# Patient Record
Sex: FEMALE | Race: BLACK OR AFRICAN AMERICAN | NOT HISPANIC OR LATINO | Employment: OTHER | URBAN - METROPOLITAN AREA
[De-identification: names, ages, dates, MRNs, and addresses within clinical notes are randomized per-mention and may not be internally consistent; named-entity substitution may affect disease eponyms.]

---

## 2017-03-01 DIAGNOSIS — H40.1133 PRIMARY OPEN ANGLE GLAUCOMA OF BOTH EYES, SEVERE STAGE: ICD-10-CM

## 2017-03-02 RX ORDER — PREDNISOLONE ACETATE 10 MG/ML
1 SUSPENSION/ DROPS OPHTHALMIC 2 TIMES DAILY
Qty: 5 ML | Refills: 0 | Status: SHIPPED | OUTPATIENT
Start: 2017-03-02 | End: 2017-04-10

## 2017-03-02 NOTE — TELEPHONE ENCOUNTER
prednisoLONE acetate (PRED FORTE) 1 % ophthalmic susp      Last Written Prescription Date:  1-17-16  Last Fill Quantity: 5 ml,   # refills: 0  Last Office Visit: 10-5-16  Future Office visit:   3-10-17  Last Note:Progress Notes      1)Endstage NVG OS -- s/p TSCPC OS (4/25/16), s/p Tube OS x2 (8/15 and last in 11/15 by Dr. Harris), H/O tube revision x2 (last on 2/23/16), H/O tube retraction and conj retraction per old notes, etiology of vision loss -- K pachy: 618/-- Tmax: OS:64 per old notes HVF:   CDR: OD:0.4 and OS:No view HRT/OCT: FHX of Glc: No  Gonio: Intolerant to: Asthma/COPD: No, on po BB but smokes Steroid Use: Yes, po pred for RA Kidney Stones: Mild CRI per patient (PCP is Dr. Carbajal at [facility] in [place]) Sulfa Allergy: No IOP targets: OS:Comfort -- IOP good  -- pt adamantly does not want to remove the left eye if possible -- eye now non-painful  2)DM c PDR -- was receiving injections with Dr. Burk  3)RA on Plaquenil and Remicade -- following with [facility]umat[facility]  4)PCIOL  5)H/O Multiple Hyphemas OS s/p washout in OR (last 2/24/16) -- [facility] without evidence of choroidal hge -- 100% Hyphema with H/O complete blood staining of K OS -- now clearing peripherally  6)H/O CAD s/p CABG per old notes  7)H/O Left sided HA -- Much improved -- likely 2/2 IOP -- will plan for HA eval with PCP prior to consideration of enucleation  8)Ptosis OS -- pt may be interested in ptosis repair OS     Patient will discontinue and hold onto Combigan (Timolol/brimonidine) which is a blue top drop. Patient will continue on Travatan which is a teal top drop at bedtime in the left eye, and Prednisolone acetate 2x/day until seen in the left eye. Patient will also continue Genteal gel 4-6x/day in the left eye. Patient will return to clinic in 3-4 months with repeat IOP check. Patient will also follow up with D.r Johnston Kathleen M Doege RN

## 2017-03-15 ENCOUNTER — OFFICE VISIT (OUTPATIENT)
Dept: OPHTHALMOLOGY | Facility: CLINIC | Age: 57
End: 2017-03-15

## 2017-03-15 ENCOUNTER — TRANSFERRED RECORDS (OUTPATIENT)
Dept: HEALTH INFORMATION MANAGEMENT | Facility: CLINIC | Age: 57
End: 2017-03-15

## 2017-03-15 DIAGNOSIS — H40.52X3 NVG (NEOVASCULAR GLAUCOMA), LEFT, SEVERE STAGE: Primary | ICD-10-CM

## 2017-03-15 DIAGNOSIS — Z96.1 PSEUDOPHAKIA OF BOTH EYES: ICD-10-CM

## 2017-03-15 PROBLEM — H40.1111 PRIMARY OPEN ANGLE GLAUCOMA OF RIGHT EYE, MILD STAGE: Status: ACTIVE | Noted: 2017-03-15

## 2017-03-15 PROBLEM — H40.1111 PRIMARY OPEN ANGLE GLAUCOMA OF RIGHT EYE, MILD STAGE: Status: RESOLVED | Noted: 2017-03-15 | Resolved: 2017-03-15

## 2017-03-15 ASSESSMENT — CONF VISUAL FIELD
OS_INFERIOR_TEMPORAL_RESTRICTION: 1
OS_INFERIOR_NASAL_RESTRICTION: 1
OS_SUPERIOR_TEMPORAL_RESTRICTION: 1
OS_SUPERIOR_NASAL_RESTRICTION: 1

## 2017-03-15 ASSESSMENT — VISUAL ACUITY
METHOD: SNELLEN - LINEAR
OS_SC: NLP
OD_SC: 20/25-2

## 2017-03-15 ASSESSMENT — SLIT LAMP EXAM - LIDS
COMMENTS: NORMAL, PTOSIS
COMMENTS: NORMAL

## 2017-03-15 ASSESSMENT — TONOMETRY
OD_IOP_MMHG: 28
OS_IOP_MMHG: 12
IOP_METHOD: TONOPEN
OD_IOP_MMHG: 28
IOP_METHOD: APPLANATION

## 2017-03-15 ASSESSMENT — CUP TO DISC RATIO: OD_RATIO: 0.3

## 2017-03-15 ASSESSMENT — EXTERNAL EXAM - LEFT EYE: OS_EXAM: NORMAL

## 2017-03-15 ASSESSMENT — EXTERNAL EXAM - RIGHT EYE: OD_EXAM: NORMAL

## 2017-03-15 NOTE — MR AVS SNAPSHOT
After Visit Summary   3/15/2017    Connie Longoria    MRN: [id number]           Patient Information     Date Of Birth          1960        Visit Information        Provider Department      3/15/2017 8:15 AM Bethany William MD [facility]        Today's Diagnoses     NVG (neovascular glaucoma), left, severe stage    -  1    Pseudophakia of both eyes          Care Instructions    Patient will discontinue and hold onto Combigan (Timolol/brimonidine) which is a blue top drop.  Patient will continue on Travatan which is a teal top drop at bedtime in the RIGHT EYE ONLY, and Prednisolone acetate 1x/day until seen in the left eye.  Patient will also continue Genteal gel 4-6x/day in the left eye. Patient will return to clinic in 1-2 months with repeat IOP check and OCT with RNFL analysis.  Patient will also follow up with the retina center for evaluation.          Follow-ups after your visit        Follow-up notes from your care team     Return 1-2 months with repeat IOP check and OCT with RNFL analysis..      Who to contact     Please call your clinic at [phone] to:    Ask questions about your health    Make or cancel appointments    Discuss your medicines    Learn about your test results    Speak to your doctor   If you have compliments or concerns about an experience at your clinic, or if you wish to file a complaint, please contact [facility] Patient Relations at [phone] or email us at [email or web address]         Additional Information About Your Visit        MyChart Information     [facility] ([facility]) is an electronic gateway that provides easy, online access to your medical records. With [facility] ([facility]), you can request a clinic appointment, read your test results, renew a prescription or communicate with your care team.     To sign up for [facility] ([facility]) visit the website at [email or web address]   You will be asked to enter the access code  listed below, as well as some personal information. Please follow the directions to create your username and password.     Your access code is: [email or web address]  Expires: 2017  8:48 AM     Your access code will  in 90 days. If you need help or a new code, please contact your [facility] or call [phone] for assistance.        Care EveryWhere ID     This is your Care EveryWhere ID. This could be used by other organizations to access your [place] medical records  [id number]         Blood Pressure from Last 3 Encounters:   16 128/62   11/03/15 135/78   10/09/15 95/59    Weight from Last 3 Encounters:   16 67.1 kg (148 lb)   11/03/15 68.5 kg (151 lb)   10/09/15 65.8 kg (145 lb)              Today, you had the following     No orders found for display         Today's Medication Changes          These changes are accurate as of: 3/15/17  8:48 AM.  If you have any questions, ask your nurse or doctor.               These medicines have changed or have updated prescriptions.        Dose/Directions    TRAVATAN Z 0.004 % ophthalmic solution   This may have changed:  Another medication with the same name was removed. Continue taking this medication, and follow the directions you see here.   Used for:  NVG (neovascular glaucoma), left, severe stage   Generic drug:  travoprost (AKILAH Free)   Changed by:  Bethany William MD        Dose:  1 drop   Place 1 drop Into the left eye At Bedtime   Quantity:  3 Bottle   Refills:  3         Stop taking these medicines if you haven't already. Please contact your care team if you have questions.     METFORMIN HCL PO   Stopped by:  Bethany William MD                    Primary Care Provider Office Phone # Fax #    Robinson Carbajal [phone]       [facility] [address]  [facility] [address]        Thank you!     Thank you for choosing [facility]  for your  care. Our goal is always to provide you with excellent care. Hearing back from our patients is one way we can continue to improve our services. Please take a few minutes to complete the written survey that you may receive in the mail after your visit with us. Thank you!             Your Updated Medication List - Protect others around you: Learn how to safely use, store and throw away your medicines at www.disposemymeds.org.          This list is accurate as of: 3/15/17  8:48 AM.  Always use your most recent med list.                   Brand Name Dispense Instructions for use    aspirin 81 MG tablet      Take 81 mg by mouth daily       atropine 1 % ophthalmic solution     5 mL    Place 1 drop Into the left eye daily       brimonidine-timolol 0.2-0.5 % ophthalmic solution    COMBIGAN    10 mL    Place 1 drop Into the left eye 2 times daily       Carboxymethylcell-Hypromellose 0.25-0.3 % Gel     30 mL    Place 1 Application Into the left eye 6 times daily       FOSAMAX PO      Take 70 mg by mouth every 30 days       gabapentin 400 MG capsule    NEURONTIN     Take 400 mg by mouth 3 times daily       hydroxychloroquine 200 MG tablet    PLAQUENIL     Take 200 mg by mouth daily       inFLIXimab 100 MG injection    REMICADE     once       insulin detemir 100 UNIT/ML injection    LEVEMIR     Inject Subcutaneous At Bedtime       LISINOPRIL PO      Take 10 mg by mouth daily       METOPROLOL TARTRATE PO      Take 25 mg by mouth daily       ofloxacin 0.3 % ophthalmic solution    OCUFLOX     Place 1 drop Into the left eye 2 times daily       polyethylene glycol powder    MIRALAX/GLYCOLAX     Take 1 capful by mouth daily       prednisoLONE acetate 1 % ophthalmic susp    PRED FORTE    5 mL    Place 1 drop Into the left eye 2 times daily       predniSONE 5 MG tablet    DELTASONE     Take 5 mg by mouth daily       PRILOSEC PO      Take 10 mg by mouth 2 times daily (before meals)       REGLAN PO      Take 5 mg by mouth        SIMVASTATIN PO      Take 10 mg by mouth At Bedtime       sucralfate 1 GM/10ML suspension    CARAFATE     Take 1 g by mouth 4 times daily       TRAVATAN Z 0.004 % ophthalmic solution   Generic drug:  travoprost (AKILAH Free)     3 Bottle    Place 1 drop Into the left eye At Bedtime       ZOFRAN PO      Take 4 mg by mouth

## 2017-03-15 NOTE — PROGRESS NOTES
1)POAG OD/Endstage NVG OS -- s/p TSCPC OS (4/25/16), s/p Tube OS x2 (8/15 and last in 11/15 by Dr. Harris), H/O tube revision x2 (last on 2/23/16), H/O tube retraction and conj retraction per old notes, etiology of vision loss -- K pachy: 618/--  Tmax: OS:64 per old notes HVF:   CDR: OD:0.4 and OS:No view HRT/OCT: FHX of Glc: No  Gonio: Intolerant to: Asthma/COPD: No, on po BB but smokes Steroid Use: Yes, po pred for RA Kidney Stones: Mild CRI per patient (PCP is Dr. Carbajal at [facility] in [place]) Sulfa Allergy: No IOP targets: OD:L-M20s and OS:Comfort -- IOP good  -- pt adamantly does not want to remove the left eye if possible -- eye now non-painful  2)DM c PDR -- was receiving injections with Dr. Burk  3)RA on Plaquenil and Remicade -- following with Rheumat[facility]  4)PCIOL  5)H/O Multiple Hyphemas OS s/p washout in OR (last 2/24/16) -- Bscan done 2/16 at  eye without evidence of choroidal hge -- 100% Hyphema with H/O complete blood staining of K OS -- now clearing peripherally  6)H/O CAD s/p CABG per old notes  7)H/O Left sided HA --  Much improved -- was likely 2/2 IOP -- will plan for HA eval with PCP prior to consideration of enucleation  8)Ptosis OS -- pt may be interested in ptosis repair OS    Patient will discontinue and hold onto Combigan (Timolol/brimonidine) which is a blue top drop.  Patient will continue on Travatan which is a teal top drop at bedtime in the RIGHT EYE ONLY, and Prednisolone acetate 1x/day until seen in the left eye.  Patient will also continue Genteal gel 4-6x/day in the left eye. Patient will return to clinic in 1-2 months with repeat IOP check, gonioscopy and OCT with RNFL analysis.  Patient will also follow up with the retina center for evaluation.        Attending Physician Attestation:  Complete documentation of historical and exam elements from today's encounter can be found in the full encounter summary report (not reduplicated in this  progress note). I personally obtained the chief complaint(s) and history of present illness.  I confirmed and edited as necessary the review of systems, past medical/surgical history, family history, social history, and examination findings as documented by others; and I examined the patient myself. I personally reviewed the relevant tests, images, and reports as documented above. I formulated and edited as necessary the assessment and plan and discussed the findings and management plan with the patient and family.  - Bethany William MD 8:48 AM 3/15/2017

## 2017-03-15 NOTE — NURSING NOTE
Chief Complaints and History of Present Illnesses   Patient presents with     Glaucoma Follow Up     HPI    Affected eye(s):  Both   Symptoms:     No decreased vision      Duration:  5 months   Frequency:  Constant       Do you have eye pain now?:  No      Comments:  Feeling very good, no eye pain    Travatan which is a teal top drop at bedtime in the left eye (9:30am)  Prednisolone acetate 2x/day until seen in the left eye  Genteal gel 4-6x/day in the left eye (uses as needed - not using very often)    MARIA ESTHER Figueroa 8:01 AM 03/15/2017

## 2017-03-15 NOTE — PATIENT INSTRUCTIONS
Patient will discontinue and hold onto Combigan (Timolol/brimonidine) which is a blue top drop.  Patient will continue on Travatan which is a teal top drop at bedtime in the RIGHT EYE ONLY, and Prednisolone acetate 1x/day until seen in the left eye.  Patient will also continue Genteal gel 4-6x/day in the left eye. Patient will return to clinic in 1-2 months with repeat IOP check and OCT with RNFL analysis.  Patient will also follow up with the retina center for evaluation.

## 2017-03-18 DIAGNOSIS — H40.52X3 NVG (NEOVASCULAR GLAUCOMA), LEFT, SEVERE STAGE: ICD-10-CM

## 2017-03-20 RX ORDER — BRIMONIDINE TARTRATE AND TIMOLOL MALEATE 2; 5 MG/ML; MG/ML
1 SOLUTION OPHTHALMIC 2 TIMES DAILY
Qty: 5 ML | Refills: 2
Start: 2017-03-20

## 2017-03-20 NOTE — TELEPHONE ENCOUNTER
Per 3-15-17 note:  Patient will discontinue and hold onto Combigan (Timolol/brimonidine) which is a blue top drop.    Pharmacy will be notified and instructed to have the patient call the clinic with any questions.      Kathleen M Doege RN

## 2017-04-03 ENCOUNTER — TRANSFERRED RECORDS (OUTPATIENT)
Dept: HEALTH INFORMATION MANAGEMENT | Facility: CLINIC | Age: 57
End: 2017-04-03

## 2017-04-10 DIAGNOSIS — H40.1133 PRIMARY OPEN ANGLE GLAUCOMA OF BOTH EYES, SEVERE STAGE: ICD-10-CM

## 2017-04-10 NOTE — TELEPHONE ENCOUNTER
prednisolone   Last Written Prescription Date:  3/2/17  Last Fill Quantity: 5 ml,   # refills: 0  Last Office Visit: 3/15/17  Future Office visit:   5/17/17  Last Note:Patient Instructions  Encounter Date: 3/15/2017  Bethany William MD   Ophthalmology      Patient will discontinue and hold onto Combigan (Timolol/brimonidine) which is a blue top drop. Patient will continue on Travatan which is a teal top drop at bedtime in the RIGHT EYE ONLY, and Prednisolone acetate 1x/day until seen in the left eye. Patient will also continue Genteal gel 4-6x/day in the left eye. Patient will return to clinic in 1-2 months with repeat IOP check and OCT with RNFL analysis. Patient will also follow up with the retina center for evaluation.       Electronically signed by Bethany William MD at 3/15/2017  8:43 AM         Routing refill request to provider for review/approval because:  Change in dosing directions per chart note

## 2017-04-12 DIAGNOSIS — H40.52X3 NVG (NEOVASCULAR GLAUCOMA), LEFT, SEVERE STAGE: ICD-10-CM

## 2017-04-12 NOTE — TELEPHONE ENCOUNTER
TRAVATAN Z 0.004 %      Last Written Prescription Date:  4/13/16  Last Fill Quantity: 3,   # refills: 3  Last Office Visit : 3/15/17  Future Office visit:  5/17/17  Routing refill request to provider for review/approval because: CORRECT  LEFT EYE?

## 2017-04-13 DIAGNOSIS — H40.1133 PRIMARY OPEN ANGLE GLAUCOMA OF BOTH EYES, SEVERE STAGE: ICD-10-CM

## 2017-04-15 NOTE — TELEPHONE ENCOUNTER
rednisoLONE acetate (PRED FORTE) 1 %       Last Written Prescription Date:  3/2/17  Last Fill Quantity: 5ML # refills: 0  Last Office Visit :3/15/17  Future Office visit:  5/17/17

## 2017-04-16 RX ORDER — PREDNISOLONE ACETATE 10 MG/ML
1 SUSPENSION/ DROPS OPHTHALMIC DAILY
Qty: 5 ML | Refills: 0 | Status: SHIPPED | OUTPATIENT
Start: 2017-04-16 | End: 2017-06-28

## 2017-04-16 RX ORDER — PREDNISOLONE ACETATE 10 MG/ML
1 SUSPENSION/ DROPS OPHTHALMIC DAILY
Qty: 5 ML | Refills: 0 | Status: SHIPPED | OUTPATIENT
Start: 2017-04-16 | End: 2018-01-03

## 2017-04-16 RX ORDER — TRAVOPROST 0.04 MG/ML
1 SOLUTION/ DROPS OPHTHALMIC AT BEDTIME
Qty: 3 BOTTLE | Refills: 1 | Status: SHIPPED | OUTPATIENT
Start: 2017-04-16 | End: 2018-01-03

## 2017-04-19 ENCOUNTER — TRANSFERRED RECORDS (OUTPATIENT)
Dept: HEALTH INFORMATION MANAGEMENT | Facility: CLINIC | Age: 57
End: 2017-04-19

## 2017-06-28 DIAGNOSIS — H40.1133 PRIMARY OPEN ANGLE GLAUCOMA OF BOTH EYES, SEVERE STAGE: ICD-10-CM

## 2017-06-28 RX ORDER — PREDNISOLONE ACETATE 10 MG/ML
1 SUSPENSION/ DROPS OPHTHALMIC DAILY
Qty: 5 ML | Refills: 0 | Status: ON HOLD | OUTPATIENT
Start: 2017-06-28 | End: 2017-11-05

## 2017-06-28 NOTE — TELEPHONE ENCOUNTER
prednisoLONE acetate (PRED FORTE) 1 % ophthalmic susp  Last Written Prescription Date:  4/17/17  Last Fill Quantity: 5ml,   # refills: 0  Last Office Visit with [facility], [facility] or [facility] prescribing provider: 3/15/17  Future Office visit:   7/26/17  Progress Notes      1)POAG OD/Endstage NVG OS -- s/p TSCPC OS (4/25/16), s/p Tube OS x2 (8/15 and last in 11/15 by Dr. Harris), H/O tube revision x2 (last on 2/23/16), H/O tube retraction and conj retraction per old notes, etiology of vision loss -- K pachy: 618/--  Tmax: OS:64 per old notes HVF:   CDR: OD:0.4 and OS:No view HRT/OCT: FHX of Glc: No  Gonio: Intolerant to: Asthma/COPD: No, on po BB but smokes Steroid Use: Yes, po pred for RA Kidney Stones: Mild CRI per patient (PCP is Dr. Carbajal at [facility]) Sulfa Allergy: No IOP targets: OD:L-M20s and OS:Comfort -- IOP good  -- pt adamantly does not want to remove the left eye if possible -- eye now non-painful  2)DM c PDR -- was receiving injections with Dr. Burk  3)RA on Plaquenil and Remicade -- following with Rheumat[facility]  4)PCIOL  5)H/O Multiple Hyphemas OS s/p washout in OR (last 2/24/16) -- Bscan done 2/16 at  eye without evidence of choroidal hge -- 100% Hyphema with H/O complete blood staining of K OS -- now clearing peripherally  6)H/O CAD s/p CABG per old notes  7)H/O Left sided HA --  Much improved -- was likely 2/2 IOP -- will plan for HA eval with PCP prior to consideration of enucleation  8)Ptosis OS -- pt may be interested in ptosis repair OS     Patient will discontinue and hold onto Combigan (Timolol/brimonidine) which is a blue top drop.  Patient will continue on Travatan which is a teal top drop at bedtime in the RIGHT EYE ONLY, and Prednisolone acetate 1x/day until seen in the left eye.  Patient will also continue Genteal gel 4-6x/day in the left eye. Patient will return to clinic in 1-2 months with repeat IOP check, gonioscopy and OCT with RNFL  analysis.  Patient will also follow up with the retina center for evaluation.                   Routing refill request to provider for review/approval because:   prednisoLONE acetate (PRED FORTE) 1 % ophthalmic susp. Per protocol

## 2017-07-19 ENCOUNTER — TRANSFERRED RECORDS (OUTPATIENT)
Dept: HEALTH INFORMATION MANAGEMENT | Facility: CLINIC | Age: 57
End: 2017-07-19

## 2017-07-26 ENCOUNTER — OFFICE VISIT (OUTPATIENT)
Dept: OPHTHALMOLOGY | Facility: CLINIC | Age: 57
End: 2017-07-26

## 2017-07-26 DIAGNOSIS — H40.52X3 NVG (NEOVASCULAR GLAUCOMA), LEFT, SEVERE STAGE: Primary | ICD-10-CM

## 2017-07-26 DIAGNOSIS — Z96.1 PSEUDOPHAKIA OF BOTH EYES: ICD-10-CM

## 2017-07-26 DIAGNOSIS — H04.123 CHRONICALLY DRY EYES, BILATERAL: ICD-10-CM

## 2017-07-26 ASSESSMENT — EXTERNAL EXAM - RIGHT EYE: OD_EXAM: NORMAL

## 2017-07-26 ASSESSMENT — TONOMETRY
OS_IOP_MMHG: 14
IOP_METHOD: APPLANATION
OD_IOP_MMHG: 21

## 2017-07-26 ASSESSMENT — GONIOSCOPY
OD_INFERIOR: 1-3
OD_SUPERIOR: 1-3
OD_TEMPORAL: 1-3
OD_NASAL: 1-3

## 2017-07-26 ASSESSMENT — VISUAL ACUITY
METHOD: SNELLEN - LINEAR
OS_SC: NLP
OD_PH_SC: 20/25
OD_SC: 20/30

## 2017-07-26 ASSESSMENT — EXTERNAL EXAM - LEFT EYE: OS_EXAM: NORMAL

## 2017-07-26 ASSESSMENT — SLIT LAMP EXAM - LIDS
COMMENTS: NORMAL
COMMENTS: NORMAL, PTOSIS

## 2017-07-26 ASSESSMENT — CONF VISUAL FIELD: OD_NORMAL: 1

## 2017-07-26 NOTE — LETTER
July 26, 2017      [facility]  [address]  [place] [address]  Fax: [phone]      RE: Connie Longoria  [address]  [facility] [place] [address]       To whom it may concern:    I am referring patient, Connie Longoria, to your clinic for an evaluation.  Please see a copy of my visit note below.    Assessment:  1)POAG OD/Endstage NVG OS -- s/p TSCPC OS (4/25/16), s/p Tube OS x2 (8/15 and last in 11/15 by Dr. Harris), H/O tube revision x2 (last on 2/23/16), H/O tube retraction and conj retraction per old notes, etiology of vision loss  K pachy: 618/--  Tmax: OS:64 per old notes  CDR: OD:0.4 and OS:No view    FHX of Glc: No  Asthma/COPD: No, on po BB but smokes  Steroid Use: Yes, po pred for RA  Kidney Stones: Mild CRI per patient (PCP is [facility] Carbajal at [facility])  Sulfa Allergy: No IOP targets: OD:L-M20s and OS:Comfort -- IOP good  -- pt adamantly does not want to remove the left eye if possible -- eye now non-painful  2)DM c PDR -- was receiving injections with Dr. Burk  3)RA on Plaquenil and Remicade -- following with Rheumat[facility]  4)PCIOL  5)H/O Multiple Hyphemas OS s/p washout in OR (last 2/24/16) -- [facility] without evidence of choroidal hge -- 100% Hyphema with H/O complete blood staining of K OS -- now clearing peripherally  6)H/O CAD s/p CABG per old notes  7)H/O Left sided HA --  Much improved -- was likely 2/2 IOP -- will plan for HA eval with PCP prior to consideration of enucleation  8)Ptosis OS -- pt may be interested in ptosis repair OS        Plan:  Patient will continue on Travatan which is a teal top drop at bedtime in the RIGHT EYE ONLY, and Prednisolone acetate 1x/day in the left eye.  Patient will also continue Genteal gel 4-6x/day in the left eye. Patient will return to clinic in 4-6 months with repeat IOP check and dilated eye exam.  Patient will also follow up with the retina center for  evaluation.        Again, thank you for allowing me to participate in the care of your patient.        Sincerely,      Bethany William MD

## 2017-07-26 NOTE — MR AVS SNAPSHOT
After Visit Summary   7/26/2017    Connie Longoria    MRN: [id number]           Patient Information     Date Of Birth          1960        Visit Information        Provider Department      7/26/2017 9:45 AM Bethany William MD [facility]        Today's Diagnoses     NVG (neovascular glaucoma), left, severe stage    -  1    Pseudophakia of both eyes        Chronically dry eyes, bilateral          Care Instructions    Patient will continue on Travatan which is a teal top drop at bedtime in the RIGHT EYE ONLY, and Prednisolone acetate 1x/day in the left eye.  Patient will also continue Genteal gel 4-6x/day in the left eye. Patient will return to clinic in 4-6 months with repeat IOP check and dilated eye exam.  Patient will also follow up with the retina center for evaluation.            Follow-ups after your visit        Your next 10 appointments already scheduled     Jan 31, 2018  9:45 AM CST   Return Visit with Bethany William MD   [facility] ([facility])    [facility]  [address] [address] [phone]              Who to contact     Please call your clinic at [phone] to:    Ask questions about your health    Make or cancel appointments    Discuss your medicines    Learn about your test results    Speak to your doctor   If you have compliments or concerns about an experience at your clinic, or if you wish to file a complaint, please contact [facility] Patient Relations at [phone] or email us at [email or web address]         Additional Information About Your Visit        Care EveryWhere ID     This is your Care EveryWhere ID. This could be used by other organizations to access your [place] medical records  [id number]         Blood Pressure from Last 3 Encounters:   02/23/16 128/62   11/03/15 135/78   10/09/15 95/59     Weight from Last 3 Encounters:   02/22/16 67.1 kg (148 lb)   11/03/15 68.5 kg (151 lb)   10/09/15 65.8 kg (145 lb)              We Performed the Following     GONIOSCOPY     OCT Optic Nerve RNFL Spectralis OU (both eyes)          Today's Medication Changes          These changes are accurate as of: 7/26/17 10:46 AM.  If you have any questions, ask your nurse or doctor.               These medicines have changed or have updated prescriptions.        Dose/Directions    * Carboxymethylcell-Hypromellose 0.25-0.3 % Gel   This may have changed:  Another medication with the same name was added. Make sure you understand how and when to take each.   Used for:  NVG (neovascular glaucoma), left, severe stage   Changed by:  Bethany William MD        Dose:  1 Application   Place 1 Application Into the left eye 6 times daily   Quantity:  30 mL   Refills:  9       * Carboxymethylcell-Hypromellose 0.25-0.3 % Gel   This may have changed:  You were already taking a medication with the same name, and this prescription was added. Make sure you understand how and when to take each.   Used for:  Chronically dry eyes, bilateral   Changed by:  Bethany William MD        Dose:  0.5 inch   Apply 0.5 inches to eye 4 times daily   Quantity:  30 mL   Refills:  11       * Notice:  This list has 2 medication(s) that are the same as other medications prescribed for you. Read the directions carefully, and ask your doctor or other care provider to review them with you.         Where to get your medicines      These medications were sent to [facility]/pharmacy #[id number] - [facility][1] [address] [address]     Phone:  [phone]     Carboxymethylcell-Hypromellose 0.25-0.3 % Gel                Primary Care Provider Office Phone # Fax #    Aliya Neo Carbajal PA-C [phone]       [facility] [address] [facility] [address]        Equal Access to Services     TEETEE RICCI AH: Alejandra dudley  irma Lemus, waraymondda luqadaha, qaalexandriata kaalmada estefanía, nikole colettein hayaasommer bethgeorge abrbrawhitcolette yancey arjun. So [facility] [phone].    ATENCIÓN: Si alexandre cueto, tiene a ron disposición servicios gratuitos de asistencia lingüística. Nidia al [phone].    We comply with applicable federal civil rights laws and [place] laws. We do not discriminate on the basis of race, color, national origin, age, disability sex, sexual orientation or gender identity.            Thank you!     Thank you for choosing [facility]  for your care. Our goal is always to provide you with excellent care. Hearing back from our patients is one way we can continue to improve our services. Please take a few minutes to complete the written survey that you may receive in the mail after your visit with us. Thank you!             Your Updated Medication List - Protect others around you: Learn how to safely use, store and throw away your medicines at www.disposemymeds.org.          This list is accurate as of: 7/26/17 10:46 AM.  Always use your most recent med list.                   Brand Name Dispense Instructions for use Diagnosis    aspirin 81 MG tablet      Take 81 mg by mouth daily        atropine 1 % ophthalmic solution     5 mL    Place 1 drop Into the left eye daily    NVG (neovascular glaucoma), left, severe stage       brimonidine-timolol 0.2-0.5 % ophthalmic solution    COMBIGAN    10 mL    Place 1 drop Into the left eye 2 times daily    NVG (neovascular glaucoma), left, severe stage       * Carboxymethylcell-Hypromellose 0.25-0.3 % Gel     30 mL    Place 1 Application Into the left eye 6 times daily    NVG (neovascular glaucoma), left, severe stage       * Carboxymethylcell-Hypromellose 0.25-0.3 % Gel     30 mL    Apply 0.5 inches to eye 4 times daily    Chronically dry eyes, bilateral       FOSAMAX PO      Take 70 mg by mouth every 30 days        gabapentin 400 MG capsule    NEURONTIN     Take 400 mg by  mouth 3 times daily        hydroxychloroquine 200 MG tablet    PLAQUENIL     Take 200 mg by mouth daily        inFLIXimab 100 MG injection    REMICADE     once        insulin detemir 100 UNIT/ML injection    LEVEMIR     Inject Subcutaneous At Bedtime        LISINOPRIL PO      Take 10 mg by mouth daily        METOPROLOL TARTRATE PO      Take 25 mg by mouth daily        ofloxacin 0.3 % ophthalmic solution    OCUFLOX     Place 1 drop Into the left eye 2 times daily        polyethylene glycol powder    MIRALAX/GLYCOLAX     Take 1 capful by mouth daily        * prednisoLONE acetate 1 % ophthalmic susp    PRED FORTE    5 mL    Place 1 drop Into the left eye daily    Primary open angle glaucoma of both eyes, severe stage       * prednisoLONE acetate 1 % ophthalmic susp    PRED FORTE    5 mL    Place 1 drop Into the left eye daily    Primary open angle glaucoma of both eyes, severe stage       predniSONE 5 MG tablet    DELTASONE     Take 5 mg by mouth daily        PRILOSEC PO      Take 10 mg by mouth 2 times daily (before meals)        REGLAN PO      Take 5 mg by mouth        SIMVASTATIN PO      Take 10 mg by mouth At Bedtime        sucralfate 1 GM/10ML suspension    CARAFATE     Take 1 g by mouth 4 times daily        TRAVATAN Z 0.004 % ophthalmic solution   Generic drug:  travoprost (AKILAH Free)     3 Bottle    Place 1 drop Into the left eye At Bedtime    NVG (neovascular glaucoma), left, severe stage       ZOFRAN PO      Take 4 mg by mouth        * Notice:  This list has 4 medication(s) that are the same as other medications prescribed for you. Read the directions carefully, and ask your doctor or other care provider to review them with you.

## 2017-07-26 NOTE — PROGRESS NOTES
1)POAG OD/Endstage NVG OS -- s/p TSCPC OS (4/25/16), s/p Tube OS x2 (8/15 and last in 11/15 by Dr. Harris), H/O tube revision x2 (last on 2/23/16), H/O tube retraction and conj retraction per old notes, etiology of vision loss -- K pachy: 618/--  Tmax: OS:64 per old notes HVF:   CDR: OD:0.4 and OS:No view HRT/OCT: FHX of Glc: No  Gonio: Intolerant to: Asthma/COPD: No, on po BB but smokes Steroid Use: Yes, po pred for RA Kidney Stones: Mild CRI per patient (PCP is Dr. Carbajal at [facility] in [place]) Sulfa Allergy: No IOP targets: OD:L-M20s and OS:Comfort -- IOP good  -- pt adamantly does not want to remove the left eye if possible -- eye now non-painful  2)DM c PDR -- was receiving injections with Dr. Burk  3)RA on Plaquenil and Remicade -- following with Rheumatologist [facility]  4)PCIOL  5)H/O Multiple Hyphemas OS s/p washout in OR (last 2/24/16) -- Bscan done 2/16 at  eye without evidence of choroidal hge -- 100% Hyphema with H/O complete blood staining of K OS -- now clearing peripherally  6)H/O CAD s/p CABG per old notes  7)H/O Left sided HA --  Much improved -- was likely 2/2 IOP -- will plan for HA eval with PCP prior to consideration of enucleation  8)Ptosis OS -- pt may be interested in ptosis repair OS    Patient will discontinue and hold onto Combigan (Timolol/brimonidine) which is a blue top drop.  Patient will continue on Travatan which is a teal top drop at bedtime in the RIGHT EYE ONLY, and Prednisolone acetate 1x/day until seen in the left eye.  Patient will also continue Genteal gel 4-6x/day in the left eye. Patient will return to clinic in 1-2 months with repeat IOP check, gonioscopy and OCT with RNFL analysis.  Patient will also follow up with the retina center for evaluation.        Attending Physician Attestation:  Complete documentation of historical and exam elements from today's encounter can be found in the full encounter summary report (not reduplicated in this  progress note). I personally obtained the chief complaint(s) and history of present illness.  I confirmed and edited as necessary the review of systems, past medical/surgical history, family history, social history, and examination findings as documented by others; and I examined the patient myself. I personally reviewed the relevant tests, images, and reports as documented above. I formulated and edited as necessary the assessment and plan and discussed the findings and management plan with the patient and family.  - Bethany William MD 8:48 AM 3/15/2017

## 2017-07-26 NOTE — LETTER
7/26/2017      RE: Connie Longoria  [address]  [place] [address]       1)POAG OD/Endstage NVG OS -- s/p TSCPC OS (4/25/16), s/p Tube OS x2 (8/15 and last in 11/15 by Dr. Harris), H/O tube revision x2 (last on 2/23/16), H/O tube retraction and conj retraction per old notes, etiology of vision loss -- K pachy: 618/--  Tmax: OS:64 per old notes HVF:   CDR: OD:0.4 and OS:No view HRT/OCT: FHX of Glc: No  Gonio: Intolerant to: Asthma/COPD: No, on po BB but smokes Steroid Use: Yes, po pred for RA Kidney Stones: Mild CRI per patient (PCP is Dr. Carbajal at [facility] in [place]) Sulfa Allergy: No IOP targets: OD:L-M20s and OS:Comfort -- IOP good  -- pt adamantly does not want to remove the left eye if possible -- eye now non-painful  2)DM c PDR -- was receiving injections with Dr. Burk  3)RA on Plaquenil and Remicade -- following with Rheumatologist [facility]  4)PCIOL  5)H/O Multiple Hyphemas OS s/p washout in OR (last 2/24/16) -- Bscan done 2/16 at [facility] without evidence of choroidal hge -- 100% Hyphema with H/O complete blood staining of K OS -- now clearing peripherally  6)H/O CAD s/p CABG per old notes  7)H/O Left sided HA --  Much improved -- was likely 2/2 IOP -- will plan for HA eval with PCP prior to consideration of enucleation  8)Ptosis OS -- pt may be interested in ptosis repair OS    Patient will discontinue and hold onto Combigan (Timolol/brimonidine) which is a blue top drop.  Patient will continue on Travatan which is a teal top drop at bedtime in the RIGHT EYE ONLY, and Prednisolone acetate 1x/day until seen in the left eye.  Patient will also continue Genteal gel 4-6x/day in the left eye. Patient will return to clinic in 1-2 months with repeat IOP check, gonioscopy and OCT with RNFL analysis.  Patient will also follow up with the retina center for evaluation.        Attending Physician Attestation:  Complete documentation of historical and exam elements  from today's encounter can be found in the full encounter summary report (not reduplicated in this progress note). I personally obtained the chief complaint(s) and history of present illness.  I confirmed and edited as necessary the review of systems, past medical/surgical history, family history, social history, and examination findings as documented by others; and I examined the patient myself. I personally reviewed the relevant tests, images, and reports as documented above. I formulated and edited as necessary the assessment and plan and discussed the findings and management plan with the patient and family.  - Bethany William MD 8:48 AM 3/15/2017        1)POAG OD/Endstage NVG OS -- s/p TSCPC OS (4/25/16), s/p Tube OS x2 (8/15 and last in 11/15 by Dr. Harris), H/O tube revision x2 (last on 2/23/16), H/O tube retraction and conj retraction per old notes, etiology of vision loss -- K pachy: 618/--  Tmax: OS:64 per old notes HVF:   CDR: OD:0.4 and OS:No view HRT/OCT: OD:RNFL WNL   FHX of Glc: No  Gonio:OD:Open with few PAS and partial angle closure and OS:closed Intolerant to: Asthma/COPD: No, on po BB but smokes Steroid Use: Yes, po pred for RA Kidney Stones: Mild CRI per patient (PCP is Dr. Carbajal at [facility] in [place]) Sulfa Allergy: No IOP targets: OD:L-M20s and OS:Comfort -- IOP good  -- pt adamantly does not want to remove the left eye if possible -- eye now non-painful  2)DM c PDR -- s/p Avastin OD -- following with Mandie Fitzgerald   3)RA on Plaquenil and Remicade -- following with Rheumatologist [facility]  4)PCIOL  5)H/O Multiple Hyphemas OS s/p washout in OR (last 2/24/16) -- [facility]scan done 2/16 at [facility] without evidence of choroidal hge -- 100% Hyphema with H/O complete blood staining of K OS -- now clearing peripherally  6)H/O CAD s/p CABG per old notes  7)H/O Left sided HA --  Much improved -- was likely 2/2 IOP -- will plan for HA eval with PCP prior to consideration of  enucleation  8)Ptosis OS -- pt may be interested in ptosis repair OS    Patient will continue on Travatan which is a teal top drop at bedtime in the RIGHT EYE ONLY, and Prednisolone acetate 1x/day in the left eye.  Patient will also continue Genteal gel 4-6x/day in the left eye. Patient will return to clinic in 4-6 months with repeat IOP check and dilated eye exam.  Patient will also follow up with the retina center for evaluation.      Attending Physician Attestation:  Complete documentation of historical and exam elements from today's encounter can be found in the full encounter summary report (not reduplicated in this progress note). I personally obtained the chief complaint(s) and history of present illness.  I confirmed and edited as necessary the review of systems, past medical/surgical history, family history, social history, and examination findings as documented by others; and I examined the patient myself. I personally reviewed the relevant tests, images, and reports as documented above. I formulated and edited as necessary the assessment and plan and discussed the findings and management plan with the patient and family.  - Bethany William MD

## 2017-07-26 NOTE — NURSING NOTE
Chief Complaints and History of Present Illnesses   Patient presents with     Glaucoma Follow Up     NVG, IOP Gonio and RNFL     HPI    Affected eye(s):  Both   Symptoms:        Duration:  3 months   Frequency:  Constant       Do you have eye pain now?:  No      Comments:  Travantan BE at Night(930pm)  Prednisolone LE 2 times per day(930pm)  None this morning, not using genteal gel  At all. Gabriella HUNT 10:07 AM July 26, 2017

## 2017-07-26 NOTE — PATIENT INSTRUCTIONS
Patient will continue on Travatan which is a teal top drop at bedtime in the RIGHT EYE ONLY, and Prednisolone acetate 1x/day in the left eye.  Patient will also continue Genteal gel 4-6x/day in the left eye. Patient will return to clinic in 4-6 months with repeat IOP check and dilated eye exam.  Patient will also follow up with the retina center for evaluation.

## 2017-07-26 NOTE — PROGRESS NOTES
1)POAG OD/Endstage NVG OS -- s/p TSCPC OS (4/25/16), s/p Tube OS x2 (8/15 and last in 11/15 by Dr. Harris), H/O tube revision x2 (last on 2/23/16), H/O tube retraction and conj retraction per old notes, etiology of vision loss -- K pachy: 618/--  Tmax: OS:64 per old notes HVF:   CDR: OD:0.4 and OS:No view HRT/OCT: OD:RNFL WNL   FHX of Glc: No  Gonio:OD:Open with few PAS and partial angle closure and OS:closed Intolerant to: Asthma/COPD: No, on po BB but smokes Steroid Use: Yes, po pred for RA Kidney Stones: Mild CRI per patient (PCP is Dr. Carbajal at [facility] in [place]) Sulfa Allergy: No IOP targets: OD:L-M20s and OS:Comfort -- IOP good  -- pt adamantly does not want to remove the left eye if possible -- eye now non-painful  2)DM c PDR -- s/p Avastin OD -- following with Mandie Fitzgerald   3)RA on Plaquenil and Remicade -- following with Rheumatologist [facility]  4)PCIOL  5)H/O Multiple Hyphemas OS s/p washout in OR (last 2/24/16) -- [facility]scan done [facility]/16 at [facility] without evidence of choroidal hge -- 100% Hyphema with H/O complete blood staining of K OS -- now clearing peripherally  6)H/O CAD s/p CABG per old notes  7)H/O Left sided HA --  Much improved -- was likely 2/2 IOP -- will plan for HA eval with PCP prior to consideration of enucleation  8)Ptosis OS -- pt may be interested in ptosis repair OS    Patient will continue on Travatan which is a teal top drop at bedtime in the RIGHT EYE ONLY, and Prednisolone acetate 1x/day in the left eye.  Patient will also continue Genteal gel 4-6x/day in the left eye. Patient will return to clinic in 4-6 months with repeat IOP check and dilated eye exam.  Patient will also follow up with the retina center for evaluation.      Attending Physician Attestation:  Complete documentation of historical and exam elements from today's encounter can be found in the full encounter summary report (not reduplicated in this progress note). I personally obtained the chief  complaint(s) and history of present illness.  I confirmed and edited as necessary the review of systems, past medical/surgical history, family history, social history, and examination findings as documented by others; and I examined the patient myself. I personally reviewed the relevant tests, images, and reports as documented above. I formulated and edited as necessary the assessment and plan and discussed the findings and management plan with the patient and family.  - Bethany William MD

## 2017-08-05 ENCOUNTER — HEALTH MAINTENANCE LETTER (OUTPATIENT)
Age: 57
End: 2017-08-05

## 2017-08-31 ENCOUNTER — TRANSFERRED RECORDS (OUTPATIENT)
Dept: HEALTH INFORMATION MANAGEMENT | Facility: CLINIC | Age: 57
End: 2017-08-31

## 2017-11-03 ENCOUNTER — OFFICE VISIT (OUTPATIENT)
Dept: OPHTHALMOLOGY | Facility: CLINIC | Age: 57
End: 2017-11-03

## 2017-11-03 ENCOUNTER — TELEPHONE (OUTPATIENT)
Dept: OPHTHALMOLOGY | Facility: CLINIC | Age: 57
End: 2017-11-03

## 2017-11-03 ENCOUNTER — OFFICE VISIT (OUTPATIENT)
Dept: OPHTHALMOLOGY | Facility: CLINIC | Age: 57
End: 2017-11-03
Attending: OPHTHALMOLOGY
Payer: MEDICARE

## 2017-11-03 ENCOUNTER — HOSPITAL ENCOUNTER (OUTPATIENT)
Facility: CLINIC | Age: 57
Setting detail: OBSERVATION
Discharge: HOME | End: 2017-11-05
Attending: ORTHOPAEDIC SURGERY | Admitting: ORTHOPAEDIC SURGERY
Payer: MEDICARE

## 2017-11-03 DIAGNOSIS — H40.52X3 NVG (NEOVASCULAR GLAUCOMA), LEFT, SEVERE STAGE: ICD-10-CM

## 2017-11-03 DIAGNOSIS — H40.2213 CHRONIC PRIMARY ANGLE-CLOSURE GLAUCOMA OF RIGHT EYE, SEVERE STAGE: Primary | ICD-10-CM

## 2017-11-03 DIAGNOSIS — E11.8 TYPE 2 DIABETES MELLITUS WITH COMPLICATION, WITH LONG-TERM CURRENT USE OF INSULIN (H): Primary | ICD-10-CM

## 2017-11-03 DIAGNOSIS — Z79.4 TYPE 2 DIABETES MELLITUS WITH COMPLICATION, WITH LONG-TERM CURRENT USE OF INSULIN (H): Primary | ICD-10-CM

## 2017-11-03 DIAGNOSIS — H40.211 ACUTE ANGLE-CLOSURE GLAUCOMA OF RIGHT EYE: Primary | ICD-10-CM

## 2017-11-03 DIAGNOSIS — H40.211 ACUTE ANGLE-CLOSURE GLAUCOMA, RIGHT EYE: Primary | ICD-10-CM

## 2017-11-03 PROBLEM — H40.9 ACUTE GLAUCOMA: Status: ACTIVE | Noted: 2017-11-03

## 2017-11-03 LAB — GLUCOSE BLDC GLUCOMTR-MCNC: 241 MG/DL (ref 70–99)

## 2017-11-03 PROCEDURE — 99213 OFFICE O/P EST LOW 20 MIN: CPT | Mod: ZF

## 2017-11-03 PROCEDURE — G0378 HOSPITAL OBSERVATION PER HR: HCPCS

## 2017-11-03 PROCEDURE — 96372 THER/PROPH/DIAG INJ SC/IM: CPT

## 2017-11-03 PROCEDURE — 99207 ZZC APP CREDIT; MD BILLING SHARED VISIT: CPT | Performed by: PHYSICIAN ASSISTANT

## 2017-11-03 PROCEDURE — 00000146 ZZHCL STATISTIC GLUCOSE BY METER IP

## 2017-11-03 PROCEDURE — A9270 NON-COVERED ITEM OR SERVICE: HCPCS | Mod: GY | Performed by: PHYSICIAN ASSISTANT

## 2017-11-03 PROCEDURE — 25000132 ZZH RX MED GY IP 250 OP 250 PS 637: Mod: GY | Performed by: PHYSICIAN ASSISTANT

## 2017-11-03 PROCEDURE — 25000125 ZZHC RX 250: Performed by: PHYSICIAN ASSISTANT

## 2017-11-03 RX ORDER — NALOXONE HYDROCHLORIDE 0.4 MG/ML
.1-.4 INJECTION, SOLUTION INTRAMUSCULAR; INTRAVENOUS; SUBCUTANEOUS
Status: DISCONTINUED | OUTPATIENT
Start: 2017-11-03 | End: 2017-11-05 | Stop reason: HOSPADM

## 2017-11-03 RX ORDER — OXYCODONE HYDROCHLORIDE 5 MG/1
5 TABLET ORAL EVERY 4 HOURS PRN
Status: DISCONTINUED | OUTPATIENT
Start: 2017-11-03 | End: 2017-11-05

## 2017-11-03 RX ORDER — ACETAZOLAMIDE 250 MG/1
250 TABLET ORAL 3 TIMES DAILY
Status: DISCONTINUED | OUTPATIENT
Start: 2017-11-04 | End: 2017-11-03

## 2017-11-03 RX ORDER — BRIMONIDINE TARTRATE 2 MG/ML
1 SOLUTION/ DROPS OPHTHALMIC 2 TIMES DAILY
Qty: 1 BOTTLE | Refills: 6 | Status: ON HOLD | OUTPATIENT
Start: 2017-11-03 | End: 2017-11-05

## 2017-11-03 RX ORDER — METOPROLOL SUCCINATE 25 MG/1
25 TABLET, EXTENDED RELEASE ORAL DAILY
Status: DISCONTINUED | OUTPATIENT
Start: 2017-11-04 | End: 2017-11-05 | Stop reason: HOSPADM

## 2017-11-03 RX ORDER — ONDANSETRON 4 MG/1
4 TABLET, ORALLY DISINTEGRATING ORAL EVERY 6 HOURS PRN
Status: DISCONTINUED | OUTPATIENT
Start: 2017-11-03 | End: 2017-11-05 | Stop reason: HOSPADM

## 2017-11-03 RX ORDER — ACETAMINOPHEN 650 MG/1
650 SUPPOSITORY RECTAL EVERY 4 HOURS PRN
Status: DISCONTINUED | OUTPATIENT
Start: 2017-11-03 | End: 2017-11-03

## 2017-11-03 RX ORDER — BRIMONIDINE TARTRATE 2 MG/ML
1 SOLUTION/ DROPS OPHTHALMIC 2 TIMES DAILY
Qty: 1 BOTTLE | Refills: 0 | Status: SHIPPED | OUTPATIENT
Start: 2017-11-03 | End: 2019-01-15

## 2017-11-03 RX ORDER — ACETAMINOPHEN 325 MG/1
650 TABLET ORAL EVERY 4 HOURS PRN
Status: DISCONTINUED | OUTPATIENT
Start: 2017-11-03 | End: 2017-11-05 | Stop reason: HOSPADM

## 2017-11-03 RX ORDER — LATANOPROST 50 UG/ML
1 SOLUTION/ DROPS OPHTHALMIC AT BEDTIME
Qty: 1 BOTTLE | Refills: 6 | Status: ON HOLD | OUTPATIENT
Start: 2017-11-03 | End: 2017-11-05

## 2017-11-03 RX ORDER — DEXTROSE MONOHYDRATE 25 G/50ML
25-50 INJECTION, SOLUTION INTRAVENOUS
Status: DISCONTINUED | OUTPATIENT
Start: 2017-11-03 | End: 2017-11-05 | Stop reason: HOSPADM

## 2017-11-03 RX ORDER — LATANOPROST 50 UG/ML
1 SOLUTION/ DROPS OPHTHALMIC AT BEDTIME
Status: DISCONTINUED | OUTPATIENT
Start: 2017-11-04 | End: 2017-11-05 | Stop reason: HOSPADM

## 2017-11-03 RX ORDER — DORZOLAMIDE HYDROCHLORIDE AND TIMOLOL MALEATE 20; 5 MG/ML; MG/ML
1 SOLUTION/ DROPS OPHTHALMIC 2 TIMES DAILY
Status: DISCONTINUED | OUTPATIENT
Start: 2017-11-03 | End: 2017-11-05 | Stop reason: HOSPADM

## 2017-11-03 RX ORDER — GABAPENTIN 300 MG/1
600 CAPSULE ORAL 3 TIMES DAILY
Status: DISCONTINUED | OUTPATIENT
Start: 2017-11-03 | End: 2017-11-05 | Stop reason: HOSPADM

## 2017-11-03 RX ORDER — LISINOPRIL 10 MG/1
10 TABLET ORAL DAILY
Status: DISCONTINUED | OUTPATIENT
Start: 2017-11-04 | End: 2017-11-05 | Stop reason: HOSPADM

## 2017-11-03 RX ORDER — ACETAZOLAMIDE 250 MG/1
250 TABLET ORAL 2 TIMES DAILY
Status: DISCONTINUED | OUTPATIENT
Start: 2017-11-04 | End: 2017-11-05 | Stop reason: HOSPADM

## 2017-11-03 RX ORDER — ONDANSETRON 2 MG/ML
4 INJECTION INTRAMUSCULAR; INTRAVENOUS EVERY 6 HOURS PRN
Status: DISCONTINUED | OUTPATIENT
Start: 2017-11-03 | End: 2017-11-05 | Stop reason: HOSPADM

## 2017-11-03 RX ORDER — ACETAZOLAMIDE 250 MG/1
250 TABLET ORAL 2 TIMES DAILY
Status: DISCONTINUED | OUTPATIENT
Start: 2017-11-03 | End: 2017-11-03

## 2017-11-03 RX ORDER — DORZOLAMIDE HYDROCHLORIDE AND TIMOLOL MALEATE 20; 5 MG/ML; MG/ML
1 SOLUTION/ DROPS OPHTHALMIC 2 TIMES DAILY
Qty: 1 BOTTLE | Refills: 6 | Status: ON HOLD | OUTPATIENT
Start: 2017-11-03 | End: 2017-11-05

## 2017-11-03 RX ORDER — TRAVOPROST OPHTHALMIC SOLUTION 0.04 MG/ML
1 SOLUTION OPHTHALMIC AT BEDTIME
Status: DISCONTINUED | OUTPATIENT
Start: 2017-11-03 | End: 2017-11-05 | Stop reason: HOSPADM

## 2017-11-03 RX ORDER — LATANOPROST 50 UG/ML
1 SOLUTION/ DROPS OPHTHALMIC AT BEDTIME
Qty: 1 BOTTLE | Refills: 0 | Status: SHIPPED | OUTPATIENT
Start: 2017-11-03 | End: 2018-01-03

## 2017-11-03 RX ORDER — ACETAZOLAMIDE 250 MG/1
250 TABLET ORAL 2 TIMES DAILY
Qty: 30 TABLET | Refills: 0 | Status: SHIPPED | OUTPATIENT
Start: 2017-11-03 | End: 2017-11-06

## 2017-11-03 RX ORDER — DORZOLAMIDE HYDROCHLORIDE AND TIMOLOL MALEATE 20; 5 MG/ML; MG/ML
1 SOLUTION/ DROPS OPHTHALMIC 2 TIMES DAILY
Qty: 1 BOTTLE | Refills: 0 | Status: SHIPPED | OUTPATIENT
Start: 2017-11-03 | End: 2018-07-26

## 2017-11-03 RX ORDER — HYDROXYCHLOROQUINE SULFATE 200 MG/1
200 TABLET, FILM COATED ORAL DAILY
Status: DISCONTINUED | OUTPATIENT
Start: 2017-11-04 | End: 2017-11-05 | Stop reason: HOSPADM

## 2017-11-03 RX ORDER — BRIMONIDINE TARTRATE 2 MG/ML
1 SOLUTION/ DROPS OPHTHALMIC 2 TIMES DAILY
Status: DISCONTINUED | OUTPATIENT
Start: 2017-11-03 | End: 2017-11-05 | Stop reason: HOSPADM

## 2017-11-03 RX ORDER — NICOTINE POLACRILEX 4 MG
15-30 LOZENGE BUCCAL
Status: DISCONTINUED | OUTPATIENT
Start: 2017-11-03 | End: 2017-11-05 | Stop reason: HOSPADM

## 2017-11-03 RX ADMIN — BRIMONIDINE TARTRATE 1 DROP: 2 SOLUTION/ DROPS OPHTHALMIC at 22:06

## 2017-11-03 RX ADMIN — TRAVOPROST OPHTHALMIC SOLUTION 1 DROP: 0.04 SOLUTION OPHTHALMIC at 22:26

## 2017-11-03 RX ADMIN — ONDANSETRON 4 MG: 4 TABLET, ORALLY DISINTEGRATING ORAL at 22:03

## 2017-11-03 RX ADMIN — DORZOLAMIDE HYDROCHLORIDE AND TIMOLOL MALEATE 1 DROP: 20; 5 SOLUTION/ DROPS OPHTHALMIC at 22:16

## 2017-11-03 RX ADMIN — OXYCODONE HYDROCHLORIDE 5 MG: 5 TABLET ORAL at 20:55

## 2017-11-03 RX ADMIN — ACETAMINOPHEN 650 MG: 325 TABLET, FILM COATED ORAL at 20:10

## 2017-11-03 RX ADMIN — GABAPENTIN 600 MG: 300 CAPSULE ORAL at 20:55

## 2017-11-03 RX ADMIN — ACETAZOLAMIDE 250 MG: 250 TABLET ORAL at 20:55

## 2017-11-03 ASSESSMENT — VISUAL ACUITY
OS_SC: NLP
OD_SC: HM
METHOD: SNELLEN - LINEAR
OS_SC: NLP
METHOD: SNELLEN - LINEAR
OD_SC: 20/200

## 2017-11-03 ASSESSMENT — EXTERNAL EXAM - LEFT EYE: OS_EXAM: NORMAL

## 2017-11-03 ASSESSMENT — TONOMETRY
IOP_METHOD: APPLANATION
OD_IOP_MMHG: 68
OS_IOP_MMHG: 13
IOP_METHOD: TONOPEN
IOP_METHOD: ICARE
OD_IOP_MMHG: 66
OD_IOP_MMHG: 89
OS_IOP_MMHG: UNAB

## 2017-11-03 ASSESSMENT — EXTERNAL EXAM - RIGHT EYE: OD_EXAM: NORMAL

## 2017-11-03 ASSESSMENT — CONF VISUAL FIELD
OS_SUPERIOR_TEMPORAL_RESTRICTION: 1
OD_SUPERIOR_NASAL_RESTRICTION: 3
OS_INFERIOR_TEMPORAL_RESTRICTION: 1
OD_INFERIOR_NASAL_RESTRICTION: 3
OS_SUPERIOR_TEMPORAL_RESTRICTION: 1
OS_INFERIOR_TEMPORAL_RESTRICTION: 1
OS_SUPERIOR_NASAL_RESTRICTION: 1
OS_INFERIOR_NASAL_RESTRICTION: 1
OS_INFERIOR_NASAL_RESTRICTION: 1
OS_SUPERIOR_NASAL_RESTRICTION: 1

## 2017-11-03 ASSESSMENT — SLIT LAMP EXAM - LIDS: COMMENTS: NORMAL

## 2017-11-03 NOTE — PROGRESS NOTES
Assessment/Plan  (H40.211) Acute angle-closure glaucoma, right eye  (primary encounter diagnosis)  Comment: Secondary to neovascularization, IOP 89 OD, patient currently has NLP vision OS from neovascular glaucoma  Plan: Discussed findings with patient. Two 250mg tablets of acetazolamide were administered at 15:08, followed by doses of Iopidine 1% at 15:08 and before patient left clinic at 15:17. Cosopt was unavailable at this facility. Patient's care will be transferred to ophthalmology clinic at [facility] where IOP will be monitored until stabilized.       Complete documentation of historical and exam elements from today's encounter can  be found in the full encounter summary report (not reduplicated in this progress  note). I personally obtained the chief complaint(s) and history of present illness. I  confirmed and edited as necessary the review of systems, past medical/surgical  history, family history, social history, and examination findings as documented by  others; and I examined the patient myself. I personally reviewed the relevant tests,  images, and reports as documented above. I formulated and edited as necessary the  assessment and plan and discussed the findings and management plan with the  patient and family.    Gareth Rosado, OD

## 2017-11-03 NOTE — PROGRESS NOTES
Patient is to be admitted to observation due to inability to go home and return to clinic in the morning. She is at significant risk of losing all meaningful vision in her only seeing eye. As such, she should be admitted to observation tonight and to return to the eye clinic in the morning. She should has already received one round of eye drops today and will need the second round (except for the Latanoprost) before bed.    She is to be on:   Brimonidine 0.1% twice daily in the right eye  Dorzolamide twice daily in the right eye  Timolol twice daily in the right eye  Latanoprost once daily at bedtime in the right eye  Diamox 250 mg PO twice daily     Transportation should be arranged for her to return to clinic for an appointment at 9 AM at the [facility] 9th floor.    Tavares Villagomez MD  Ophthalmology, PGY-3    I read and agree with above.  Bethany William MD

## 2017-11-03 NOTE — NURSING NOTE
Chief Complaints and History of Present Illnesses   Patient presents with     Follow Up For     Decreased vision RE with pain and increased ocular pressure     HPI    Last Eye Exam:  7/26/17   Additional Referring Providers:  Dr William   Affected eye(s):  Right   Symptoms:        Duration:  5 days   Frequency:  Constant       Do you have eye pain now?:  Yes   Location:  OD   Pain Level:  Extreme Pain (9)   Pain Duration:  5 days   Pain Frequency:  Constant      Comments:  She is here today for a follow up of RE pain.   [facility] sent her because of very high ocular tension.  She was given Diamox [facility] before coming here   At the [facility] her right eye tension was 89. They were unable to get the left. After Diamox, RE 68, LE 13  She is still having Pain RE    John Ashley COT 4:13 PM November 3, 2017

## 2017-11-03 NOTE — TELEPHONE ENCOUNTER
Pt reporting vision loss this AM in right eye   Right sided headache and foreign body pain in right eye   Monocular pt previous vision right eye 20/30  H/o proliferative diabetic retinopathy neovascular glaucoma left eye, open angle glaucoma right eye     Dr. Rosado [place] [facility] [place] assess plan of care today  Mason Williamson RN 1:43 PM 11/03/17

## 2017-11-03 NOTE — NURSING NOTE
Chief Complaints and History of Present Illnesses   Patient presents with     Sudden Vision Loss Right Eye     HPI    Affected eye(s):  Right   Symptoms:     Blurred vision   Photophobia      Duration:  1 day   Frequency:  Constant       Do you have eye pain now?:  Yes   Location:  OD   Pain Level:  Worst Pain (10)   Pain Frequency:  Constant   Pain Characteristics:  Stabbing      Comments:  Vision blurred starting this morning, but also last week off and on as well. Pain began this morning which has been constant. Very light sensitive.     Travatan qhs OD, started Pred a couple days ago a few times per day (made it feel better)    Helen Cartwright COT 2:36 PM November 3, 2017

## 2017-11-03 NOTE — MR AVS SNAPSHOT
After Visit Summary   11/3/2017    Connie Longoria    MRN: [id number]           Patient Information     Date Of Birth          1960        Visit Information        Provider Department      11/3/2017 2:20 PM Gareth Rosado, ISAAC [facility] Ophthalmology        Today's Diagnoses     Acute angle-closure glaucoma, right eye    -  1       Follow-ups after your visit        Your next 10 appointments already scheduled     Nov 03, 2017  4:00 PM CDT   NEW GENERAL with Tavares Villagomez MD   Eye Clinic ([facility])    [address]  [address]  [address]  [facility] [address]   [phone]            Jan 31, 2018  9:45 AM CST   Return Visit with Bethany William MD   [facility] ([facility])    [facility] [address] [facility]  [address]  [facility] [id number] [phone]              Who to contact     Please call your clinic at [phone] to:    Ask questions about your health    Make or cancel appointments    Discuss your medicines    Learn about your test results    Speak to your doctor   If you have compliments or concerns about an experience at your clinic, or if you wish to file a complaint, please contact [facility] Patient Relations at [phone] or email us at [email or web address]         Additional Information About Your Visit        MyChart Information     Charit[employer] is an electronic gateway that provides easy, online access to your medical records. With [employer], you can request a clinic appointment, read your test results, renew a prescription or communicate with your care team.     To sign up for [employer] visit the website at [email or web address]   You will be asked to enter the access code listed below, as well as some personal information. Please follow the directions to create your username and password.     Your access code is:  [email or web address]  Expires: 2018  3:41 PM     Your access code will  in 90 days. If you need help or a new code, please contact your [facility] or call [phone] for assistance.        Care EveryWhere ID     This is your Care EveryWhere ID. This could be used by other organizations to access your [place] medical records  [id number]         Blood Pressure from Last 3 Encounters:   16 128/62   11/03/15 135/78   10/09/15 95/59    Weight from Last 3 Encounters:   16 67.1 kg (148 lb)   11/03/15 68.5 kg (151 lb)   10/09/15 65.8 kg (145 lb)              Today, you had the following     No orders found for display       Primary Care Provider Office Phone # Fax #    Aliya See JEFFERY Carbajal [phone]       [facility] [address] [facility] [address]        Equal Access to Services     TEETEE RICCI : Hadii aad ku hadasho Soomaali, waaxda luqadaha, qaybta kaalmada adeegyada, waxay idiin hayaan adeeg barbraaracolette yancey . So [facility] [phone].    ATENCIÓN: Si habla español, tiene a ron disposición servicios gratuitos de asistencia lingüística. [facility] [phone].    We comply with applicable federal civil rights laws and [place] laws. We do not discriminate on the basis of race, color, national origin, age, disability, sex, sexual orientation, or gender identity.            Thank you!     Thank you for choosing [facility]  for your care. Our goal is always to provide you with excellent care. Hearing back from our patients is one way we can continue to improve our services. Please take a few minutes to complete the written survey that you may receive in the mail after your visit with us. Thank you!             Your Updated Medication List - Protect others around you: Learn how to safely use, store and throw away your medicines at www.disposemymeds.org.          This list is accurate as of: 11/3/17  3:41 PM.  Always use your most recent med  list.                   Brand Name Dispense Instructions for use Diagnosis    aspirin 81 MG tablet      Take 81 mg by mouth daily        atropine 1 % ophthalmic solution     5 mL    Place 1 drop Into the left eye daily    NVG (neovascular glaucoma), left, severe stage       brimonidine-timolol 0.2-0.5 % ophthalmic solution    COMBIGAN    10 mL    Place 1 drop Into the left eye 2 times daily    NVG (neovascular glaucoma), left, severe stage       * Carboxymethylcell-Hypromellose 0.25-0.3 % Gel     30 mL    Place 1 Application Into the left eye 6 times daily    NVG (neovascular glaucoma), left, severe stage       * Carboxymethylcell-Hypromellose 0.25-0.3 % Gel     30 mL    Apply 0.5 inches to eye 4 times daily    Chronically dry eyes, bilateral       FOSAMAX PO      Take 70 mg by mouth every 30 days        gabapentin 400 MG capsule    NEURONTIN     Take 400 mg by mouth 3 times daily        hydroxychloroquine 200 MG tablet    PLAQUENIL     Take 200 mg by mouth daily        inFLIXimab 100 MG injection    REMICADE     once        insulin detemir 100 UNIT/ML injection    LEVEMIR     Inject Subcutaneous At Bedtime        LISINOPRIL PO      Take 10 mg by mouth daily        METOPROLOL TARTRATE PO      Take 25 mg by mouth daily        ofloxacin 0.3 % ophthalmic solution    OCUFLOX     Place 1 drop Into the left eye 2 times daily        polyethylene glycol powder    MIRALAX/GLYCOLAX     Take 1 capful by mouth daily        * prednisoLONE acetate 1 % ophthalmic susp    PRED FORTE    5 mL    Place 1 drop Into the left eye daily    Primary open angle glaucoma of both eyes, severe stage       * prednisoLONE acetate 1 % ophthalmic susp    PRED FORTE    5 mL    Place 1 drop Into the left eye daily    Primary open angle glaucoma of both eyes, severe stage       predniSONE 5 MG tablet    DELTASONE     Take 5 mg by mouth daily        PRILOSEC PO      Take 10 mg by mouth 2 times daily (before meals)        REGLAN PO      Take 5 mg by  mouth        SIMVASTATIN PO      Take 10 mg by mouth At Bedtime        sucralfate 1 GM/10ML suspension    CARAFATE     Take 1 g by mouth 4 times daily        TRAVATAN Z 0.004 % ophthalmic solution   Generic drug:  travoprost (AKILAH Free)     3 Bottle    Place 1 drop Into the left eye At Bedtime    NVG (neovascular glaucoma), left, severe stage       ZOFRAN PO      Take 4 mg by mouth        * Notice:  This list has 4 medication(s) that are the same as other medications prescribed for you. Read the directions carefully, and ask your doctor or other care provider to review them with you.

## 2017-11-03 NOTE — IP AVS SNAPSHOT
Unit [address] Observation [address] [address]    Phone:  [phone]    Fax:  [phone]                                       After Visit Summary   11/3/2017    Connie Longoria    MRN: [id number]           After Visit Summary Signature Page     I have received my discharge instructions, and my questions have been answered. I have discussed any challenges I see with this plan with the nurse or doctor.    ..........................................................................................................................................  Patient/Patient Representative Signature      ..........................................................................................................................................  Patient Representative Print Name and Relationship to Patient    ..................................................               ................................................  Date                                            Time    ..........................................................................................................................................  Reviewed by Signature/Title    ...................................................              ..............................................  Date                                                            Time

## 2017-11-03 NOTE — IP AVS SNAPSHOT
MRN:[id number]                      After Visit Summary   11/3/2017    Connie Longoria    MRN: [id number]           Thank you!     Thank you for choosing [facility] for your care. Our goal is always to provide you with excellent care. Hearing back from our patients is one way we can continue to improve our services. Please take a few minutes to complete the written survey that you may receive in the mail after you visit with us. Thank you!        Patient Information     Date Of Birth          1960        Designated Caregiver       Most Recent Value    Caregiver    Will someone help with your care after discharge? yes    Name of designated caregiver spouse [spouse]    Phone number of caregiver see-file    Caregiver address see-file      About your hospital stay     You were admitted on:  November 3, 2017 You last received care in the:  Unit 6[address] Observation [facility]    You were discharged on:  November 5, 2017        Reason for your hospital stay       You were hospitalized for acute glaucoma. You had a procedure done by Ophthalmology, which was to remove fluid from your eye and perform an injection in your R eye. You need to return to clinic on Monday 11/6 as scheduled by Ophthalmology.                  Who to Call     For medical emergencies, please call [phone].  For non-urgent questions about your medical care, please call your primary care provider or clinic, [phone]          Attending Provider     Provider Specialty    Jem Castorena MD Internal Medicine    Stewart Carbone MD Internal Medicine       Primary Care Provider Office Phone # Fax #    Aliya Neo Carbajal PA-C [phone]       When to contact your care team       Call your PCP or return to ED for temperatures > 100.7 degrees, worsening or changing pain, uncontrolled vomiting or inability to tolerate oral intake, new or worsening diarrhea, new or worsening shortness of breath, decreased urine output,  yellowing of the eyes or skin, confusion, weakness, blood in urine or stools.                  After Care Instructions     Activity       Your activity upon discharge: activity as tolerated and no driving or operating heavy machinery            Diet       Follow this diet upon discharge:       Regular Diet Adult            Monitor and record       blood glucose 4 times a day, before meals and at bedtime                  Follow-up Appointments     Adult [facility] Follow-up and recommended labs and tests       Follow up with Dr. Tavares Villagomez , at [facility], Monday 11/6/17 at 2:30pm   for eye follow up. They will determine what the plan for other surgeries are.    Clinic Address:    [place] [address]    [employer] ride set up for this appointment.  [employer] will pick you up at 12:22PM at home to take you to 2:30 appointment.  They will pick you up again from appointment at 4:30pm to take you home.      In the future if you need after hours service through [employer], you may call the [facility] service provider directly:  [phone]        Follow up with PCP within 7 days for hospital follow up and chronic medical management. Recommend repeat CBC, CMP, BG check, BP check    Appointments on [place] and/or [place] campus (with [facility] or [facility] provider or service). Call [phone] if you haven't heard regarding these appointments within 7 days of discharge.                  Your next 10 appointments already scheduled     Jan 31, 2018  9:45 AM CST   Return Visit with Bethany William MD   [facility] ([facility])    [facility]  [address] [address] [phone]              Pending Results     No orders found from 11/1/2017 to 11/4/2017.            Statement of Approval     Ordered          11/05/17 1118  I have reviewed and agree with all  "the recommendations and orders detailed in this document.  EFFECTIVE NOW     Approved and electronically signed by:  Jose J Moody PA-C             Admission Information     Date & Time Provider Department Dept. Phone    11/3/2017 Stewart Carbone MD Unit 6D Observation [facility] [place] [phone]      Your Vitals Were     Blood Pressure Pulse Temperature Respirations Weight Pulse Oximetry    103/61 (BP Location: Right arm) 55 98.1  F (36.7  C) (Oral) 16 72.2 kg (159 lb 3.2 oz) 100%    BMI (Body Mass Index)                   27.33 kg/m2           [employer] Information     uShare lets you send messages to your doctor, view your test results, renew your prescriptions, schedule appointments and more. To sign up, go to [email or web address] . Click on \"Log in\" on the left side of the screen, which will take you to the Welcome page. Then click on \"Sign up Now\" on the right side of the page.     You will be asked to enter the access code listed below, as well as some personal information. Please follow the directions to create your username and password.     Your access code is: [email or web address]  Expires: 2018  2:41 PM     Your access code will  in 90 days. If you need help or a new code, please call your [facility] clinic or [phone].        Care EveryWhere ID     This is your Care EveryWhere ID. This could be used by other organizations to access your [facility] medical records  [id number]        Equal Access to Services     [facility] Medical CenterFRANKI : Hadii octavia ku hadasho Soomaali, waaxda luqadaha, qaybta kaalmada adeegyada, nikole yancey . So [facility] [phone].    ATENCIÓN: Si habla español, tiene a ron disposición servicios gratuitos de asistencia lingüística. Llame al [phone].    We comply with applicable federal civil rights laws and [place] laws. We do not discriminate on the basis of race, color, national origin, age, disability, sex, sexual orientation, or gender identity.       "         Review of your medicines      START taking        Dose / Directions    insulin detemir 100 UNIT/ML injection   Commonly known as:  LEVEMIR   Used for:  Type 2 diabetes mellitus with complication, with long-term current use of insulin (H)   Replaces:  insulin detemir 100 UNIT/ML injection        Dose:  35 Units   Inject 35 Units Subcutaneous At Bedtime   Quantity:  3 mL   Refills:  0         CONTINUE these medicines which may have CHANGED, or have new prescriptions. If we are uncertain of the size of tablets/capsules you have at home, strength may be listed as something that might have changed.        Dose / Directions    atropine 1 % ophthalmic solution   This may have changed:    - how to take this  - when to take this   Used for:  NVG (neovascular glaucoma), left, severe stage        Dose:  1 drop   Place 1 drop into both eyes 2 times daily   Quantity:  2 mL   Refills:  0       brimonidine 0.2 % ophthalmic solution   Commonly known as:  ALPHAGAN   This may have changed:  Another medication with the same name was removed. Continue taking this medication, and follow the directions you see here.   Used for:  Acute angle-closure glaucoma of right eye        Dose:  1 drop   Place 1 drop into the right eye 2 times daily   Quantity:  1 Bottle   Refills:  0       Carboxymethylcell-Hypromellose 0.25-0.3 % Gel   This may have changed:  Another medication with the same name was removed. Continue taking this medication, and follow the directions you see here.   Used for:  NVG (neovascular glaucoma), left, severe stage        Dose:  1 Application   Place 1 Application Into the left eye 6 times daily   Quantity:  30 mL   Refills:  9       dorzolamide-timolol 2-0.5 % ophthalmic solution   Commonly known as:  COSOPT   This may have changed:  Another medication with the same name was removed. Continue taking this medication, and follow the directions you see here.   Used for:  Acute angle-closure glaucoma of right eye         Dose:  1 drop   Place 1 drop into the right eye 2 times daily   Quantity:  1 Bottle   Refills:  0       latanoprost 0.005 % ophthalmic solution   Commonly known as:  XALATAN   This may have changed:  Another medication with the same name was removed. Continue taking this medication, and follow the directions you see here.   Used for:  Acute angle-closure glaucoma of right eye        Dose:  1 drop   Place 1 drop into the right eye At Bedtime   Quantity:  1 Bottle   Refills:  0       prednisoLONE acetate 1 % ophthalmic susp   Commonly known as:  PRED FORTE   This may have changed:  Another medication with the same name was removed. Continue taking this medication, and follow the directions you see here.   Used for:  Primary open angle glaucoma of both eyes, severe stage        Dose:  1 drop   Place 1 drop Into the left eye daily   Quantity:  5 mL   Refills:  0         CONTINUE these medicines which have NOT CHANGED        Dose / Directions    acetaZOLAMIDE 250 MG tablet   Commonly known as:  DIAMOX   Used for:  Chronic primary angle-closure glaucoma of right eye, severe stage        Dose:  250 mg   Take 1 tablet (250 mg) by mouth 2 times daily   Quantity:  30 tablet   Refills:  0       aspirin 81 MG tablet        Dose:  81 mg   Take 81 mg by mouth daily   Refills:  0       brimonidine-timolol 0.2-0.5 % ophthalmic solution   Commonly known as:  COMBIGAN   Used for:  NVG (neovascular glaucoma), left, severe stage        Dose:  1 drop   Place 1 drop Into the left eye 2 times daily   Quantity:  10 mL   Refills:  3       FOSAMAX PO        Dose:  70 mg   Take 70 mg by mouth every 30 days   Refills:  0       gabapentin 400 MG capsule   Commonly known as:  NEURONTIN        Dose:  400 mg   Take 400 mg by mouth 3 times daily   Refills:  0       hydroxychloroquine 200 MG tablet   Commonly known as:  PLAQUENIL        Dose:  200 mg   Take 200 mg by mouth daily   Refills:  0       inFLIXimab 100 MG injection   Commonly known as:   REMICADE        once   Refills:  0       LISINOPRIL PO        Dose:  10 mg   Take 10 mg by mouth daily   Refills:  0       METOPROLOL TARTRATE PO        Dose:  25 mg   Take 25 mg by mouth daily   Refills:  0       polyethylene glycol powder   Commonly known as:  MIRALAX/GLYCOLAX        Dose:  1 capful   Take 1 capful by mouth daily   Refills:  0       predniSONE 5 MG tablet   Commonly known as:  DELTASONE        Dose:  5 mg   Take 5 mg by mouth daily   Refills:  0       PRILOSEC PO        Dose:  10 mg   Take 10 mg by mouth 2 times daily (before meals)   Refills:  0       REGLAN PO        Dose:  5 mg   Take 5 mg by mouth   Refills:  0       SIMVASTATIN PO        Dose:  10 mg   Take 10 mg by mouth At Bedtime   Refills:  0       sucralfate 1 GM/10ML suspension   Commonly known as:  CARAFATE        Dose:  1 g   Take 1 g by mouth 4 times daily   Refills:  0       TRAVATAN Z 0.004 % ophthalmic solution   Used for:  NVG (neovascular glaucoma), left, severe stage   Generic drug:  travoprost (AKILAH Free)        Dose:  1 drop   Place 1 drop Into the left eye At Bedtime   Quantity:  3 Bottle   Refills:  1       ZOFRAN PO        Dose:  4 mg   Take 4 mg by mouth   Refills:  0         STOP taking     insulin detemir 100 UNIT/ML injection   Commonly known as:  LEVEMIR   Replaced by:  insulin detemir 100 UNIT/ML injection           ofloxacin 0.3 % ophthalmic solution   Commonly known as:  OCUFLOX                Where to get your medicines      These medications were sent to [facility] [place], [place] - [address]  [address] [address]     Phone:  [phone]     atropine 1 % ophthalmic solution                Protect others around you: Learn how to safely use, store and throw away your medicines at www.disposemymeds.org.             Medication List: This is a list of all your medications and when to take them. Check marks below indicate your daily home schedule. Keep this list as a  reference.      Medications           Morning Afternoon Evening Bedtime As Needed    acetaZOLAMIDE 250 MG tablet   Commonly known as:  DIAMOX   Take 1 tablet (250 mg) by mouth 2 times daily   Last time this was given:  250 mg on 11/5/2017  8:23 AM                                aspirin 81 MG tablet   Take 81 mg by mouth daily                                atropine 1 % ophthalmic solution   Place 1 drop into both eyes 2 times daily                                brimonidine 0.2 % ophthalmic solution   Commonly known as:  ALPHAGAN   Place 1 drop into the right eye 2 times daily   Last time this was given:  1 drop on 11/5/2017  8:23 AM                                brimonidine-timolol 0.2-0.5 % ophthalmic solution   Commonly known as:  COMBIGAN   Place 1 drop Into the left eye 2 times daily                                Carboxymethylcell-Hypromellose 0.25-0.3 % Gel   Place 1 Application Into the left eye 6 times daily                                dorzolamide-timolol 2-0.5 % ophthalmic solution   Commonly known as:  COSOPT   Place 1 drop into the right eye 2 times daily   Last time this was given:  1 drop on 11/5/2017  8:26 AM                                FOSAMAX PO   Take 70 mg by mouth every 30 days                                gabapentin 400 MG capsule   Commonly known as:  NEURONTIN   Take 400 mg by mouth 3 times daily   Last time this was given:  600 mg on 11/5/2017  8:23 AM                                hydroxychloroquine 200 MG tablet   Commonly known as:  PLAQUENIL   Take 200 mg by mouth daily   Last time this was given:  200 mg on 11/5/2017  8:24 AM                                inFLIXimab 100 MG injection   Commonly known as:  REMICADE   once                                insulin detemir 100 UNIT/ML injection   Commonly known as:  LEVEMIR   Inject 35 Units Subcutaneous At Bedtime   Last time this was given:  35 Units on 11/4/2017 10:09 PM                                latanoprost 0.005 %  ophthalmic solution   Commonly known as:  XALATAN   Place 1 drop into the right eye At Bedtime   Last time this was given:  1 drop on 11/4/2017 10:09 PM                                LISINOPRIL PO   Take 10 mg by mouth daily   Last time this was given:  10 mg on 11/5/2017  8:24 AM                                METOPROLOL TARTRATE PO   Take 25 mg by mouth daily                                polyethylene glycol powder   Commonly known as:  MIRALAX/GLYCOLAX   Take 1 capful by mouth daily                                prednisoLONE acetate 1 % ophthalmic susp   Commonly known as:  PRED FORTE   Place 1 drop Into the left eye daily                                predniSONE 5 MG tablet   Commonly known as:  DELTASONE   Take 5 mg by mouth daily                                PRILOSEC PO   Take 10 mg by mouth 2 times daily (before meals)   Last time this was given:  20 mg on 11/5/2017  8:24 AM                                REGLAN PO   Take 5 mg by mouth                                SIMVASTATIN PO   Take 10 mg by mouth At Bedtime                                sucralfate 1 GM/10ML suspension   Commonly known as:  CARAFATE   Take 1 g by mouth 4 times daily                                TRAVATAN Z 0.004 % ophthalmic solution   Place 1 drop Into the left eye At Bedtime   Last time this was given:  1 drop on 11/4/2017 10:10 PM   Generic drug:  travoprost (AKILAH Free)                                ZOFRAN PO   Take 4 mg by mouth

## 2017-11-03 NOTE — NURSING NOTE
Brimonidine0.2% one drop in right eye given at 1640  Timolol 0.5% one drop right eye given at 1641  Dorzolamide one drop right eye given at 1642    Pt in acute angle closure glaucoma  Drops given per telephone order by dr. prasad    No latanoprost available    Dr. Villagomez to f/u today    Per dr. prasad telephone orders for pt to go home with   Brimonidine 0.2% one drop in right eye 2/day  cosopt one drop in right eye 2/day  Latanoprost one drop in right eye at night  Diamox 250 mg tablets take one tablet by mouth 3/day-- (pt took 2 today, will take one more tonight)  Pt to f/u tomorrow at 9 AM with dr. Villagomez  Pt to stay NPO after midnight  Mason Williamson RN 4:56 PM 11/03/17

## 2017-11-03 NOTE — MR AVS SNAPSHOT
After Visit Summary   11/3/2017    Connie Longoria    MRN: [id number]           Patient Information     Date Of Birth          1960        Visit Information        Provider Department      11/3/2017 4:00 PM Tavares Villagomez [facility]        Today's Diagnoses     Chronic primary angle-closure glaucoma of right eye, severe stage    -  1       Follow-ups after your visit        Your next 10 appointments already scheduled     Nov 29, 2017 10:00 AM CST   RETURN GLAUCOMA with Bethany William MD   [facility]    [facility]  [address] [facility] [address]  [facility] [address]   [phone]            Feb 07, 2018 10:30 AM CST   Return Visit with Bethany William MD   [facility])    [facility]  [address] [facility] [address]  [facility] [address]   [phone]              Who to contact     Please call your clinic at [phone] to:    Ask questions about your health    Make or cancel appointments    Discuss your medicines    Learn about your test results    Speak to your doctor   If you have compliments or concerns about an experience at your clinic, or if you wish to file a complaint, please contact [facility] Patient Relations at [phone] or email us at [email or web address]         Additional Information About Your Visit        MyChart Information     [employer] is an electronic gateway that provides easy, online access to your medical records. With [employer], you can request a clinic appointment, read your test results, renew a prescription or communicate with your care team.     To sign up for [employer] visit the website at [email or web address]   You will be asked to enter the access code listed below, as well as some personal information. Please follow the directions to create your username and  password.     Your access code is: [email or web address]  Expires: 2018  2:41 PM     Your access code will  in 90 days. If you need help or a new code, please contact your [facility] or call [phone] for assistance.        Care EveryWhere ID     This is your Care EveryWhere ID. This could be used by other organizations to access your [facility] medical records  [id number]         Blood Pressure from Last 3 Encounters:   17 103/61   16 128/62   11/03/15 135/78    Weight from Last 3 Encounters:   17 72.2 kg (159 lb 3.2 oz)   16 67.1 kg (148 lb)   11/03/15 68.5 kg (151 lb)              Today, you had the following     No orders found for display         Today's Medication Changes          These changes are accurate as of: 11/3/17  7:11 PM.  If you have any questions, ask your nurse or doctor.               Start taking these medicines.        Dose/Directions    acetaZOLAMIDE 250 MG tablet   Commonly known as:  DIAMOX   Used for:  Chronic primary angle-closure glaucoma of right eye, severe stage   Started by:  Tavares Villagomez MD        Dose:  250 mg   Take 1 tablet (250 mg) by mouth 2 times daily   Quantity:  30 tablet   Refills:  0         These medicines have changed or have updated prescriptions.        Dose/Directions    * brimonidine 0.2 % ophthalmic solution   Commonly known as:  ALPHAGAN   This may have changed:  Another medication with the same name was added. Make sure you understand how and when to take each.   Used for:  Acute angle-closure glaucoma of right eye   Changed by:  Tavares Villagomez MD        Dose:  1 drop   Place 1 drop into the right eye 2 times daily   Quantity:  1 Bottle   Refills:  0       * brimonidine 0.2 % ophthalmic solution   Commonly known as:  ALPHAGAN   This may have changed:  You were already taking a medication with the same name, and this prescription was added. Make sure you understand how and when to take  each.   Used for:  Chronic primary angle-closure glaucoma of right eye, severe stage   Changed by:  Tavares Villagomez MD        Dose:  1 drop   Place 1 drop into the right eye 2 times daily   Quantity:  1 Bottle   Refills:  6       * dorzolamide-timolol 2-0.5 % ophthalmic solution   Commonly known as:  COSOPT   This may have changed:  Another medication with the same name was added. Make sure you understand how and when to take each.   Used for:  Acute angle-closure glaucoma of right eye   Changed by:  Tavares Villagomez MD        Dose:  1 drop   Place 1 drop into the right eye 2 times daily   Quantity:  1 Bottle   Refills:  0       * dorzolamide-timolol 2-0.5 % ophthalmic solution   Commonly known as:  COSOPT   This may have changed:  You were already taking a medication with the same name, and this prescription was added. Make sure you understand how and when to take each.   Used for:  Chronic primary angle-closure glaucoma of right eye, severe stage   Changed by:  Tavares Villagomez MD        Dose:  1 drop   Place 1 drop into the right eye 2 times daily   Quantity:  1 Bottle   Refills:  6       * latanoprost 0.005 % ophthalmic solution   Commonly known as:  XALATAN   This may have changed:  Another medication with the same name was added. Make sure you understand how and when to take each.   Used for:  Acute angle-closure glaucoma of right eye   Changed by:  Tavares Villagomez MD        Dose:  1 drop   Place 1 drop into the right eye At Bedtime   Quantity:  1 Bottle   Refills:  0       * latanoprost 0.005 % ophthalmic solution   Commonly known as:  XALATAN   This may have changed:  You were already taking a medication with the same name, and this prescription was added. Make sure you understand how and when to take each.   Used for:  Chronic primary angle-closure glaucoma of right eye, severe stage   Changed by:  Tavares Villagomez MD        Dose:  1 drop   Place 1 drop into the right  eye At Bedtime   Quantity:  1 Bottle   Refills:  6       * Notice:  This list has 6 medication(s) that are the same as other medications prescribed for you. Read the directions carefully, and ask your doctor or other care provider to review them with you.         Where to get your medicines      These medications were sent to [facility]/pharmacy #[id number] - [place], [place] - [address]  [address] [facility] [address]     Phone:  [phone]     acetaZOLAMIDE 250 MG tablet    brimonidine 0.2 % ophthalmic solution    dorzolamide-timolol 2-0.5 % ophthalmic solution    latanoprost 0.005 % ophthalmic solution                Primary Care Provider Office Phone # Fax #    Aliya Neo Carbajal PA-C [phone]       [facility] [address] [place] [address]        Equal Access to Services     TEETEE RICCI AH: Hadii octavia solis hadasho Soomaali, waaxda luqadaha, qaybta kaalmada adeegyada, nikole ty hayaan kirt yancey . So [facility] [phone].    ATENCIÓN: Si habla español, tiene a ron disposición servicios gratuitos de asistencia lingüística. Llame al [phone].    We comply with applicable federal civil rights laws and [place] laws. We do not discriminate on the basis of race, color, national origin, age, disability, sex, sexual orientation, or gender identity.            Thank you!     Thank you for choosing [facility]  for your care. Our goal is always to provide you with excellent care. Hearing back from our patients is one way we can continue to improve our services. Please take a few minutes to complete the written survey that you may receive in the mail after your visit with us. Thank you!             Your Updated Medication List - Protect others around you: Learn how to safely use, store and throw away your medicines at www.disposemymeds.org.          This list is accurate as of: 11/3/17  7:11 PM.  Always use your most recent med list.                   Brand Name Dispense  Instructions for use Diagnosis    acetaZOLAMIDE 250 MG tablet    DIAMOX    30 tablet    Take 1 tablet (250 mg) by mouth 2 times daily    Chronic primary angle-closure glaucoma of right eye, severe stage       aspirin 81 MG tablet      Take 81 mg by mouth daily        * atropine 1 % ophthalmic solution     5 mL    Place 1 drop Into the left eye daily    NVG (neovascular glaucoma), left, severe stage       * atropine 1 % ophthalmic solution     2 mL    Place 1 drop into both eyes 2 times daily    NVG (neovascular glaucoma), left, severe stage       * brimonidine 0.2 % ophthalmic solution    ALPHAGAN    1 Bottle    Place 1 drop into the right eye 2 times daily    Acute angle-closure glaucoma of right eye       * brimonidine 0.2 % ophthalmic solution    ALPHAGAN    1 Bottle    Place 1 drop into the right eye 2 times daily    Chronic primary angle-closure glaucoma of right eye, severe stage       brimonidine-timolol 0.2-0.5 % ophthalmic solution    COMBIGAN    10 mL    Place 1 drop Into the left eye 2 times daily    NVG (neovascular glaucoma), left, severe stage       * Carboxymethylcell-Hypromellose 0.25-0.3 % Gel     30 mL    Place 1 Application Into the left eye 6 times daily    NVG (neovascular glaucoma), left, severe stage       * Carboxymethylcell-Hypromellose 0.25-0.3 % Gel     30 mL    Apply 0.5 inches to eye 4 times daily    Chronically dry eyes, bilateral       * dorzolamide-timolol 2-0.5 % ophthalmic solution    COSOPT    1 Bottle    Place 1 drop into the right eye 2 times daily    Acute angle-closure glaucoma of right eye       * dorzolamide-timolol 2-0.5 % ophthalmic solution    COSOPT    1 Bottle    Place 1 drop into the right eye 2 times daily    Chronic primary angle-closure glaucoma of right eye, severe stage       FOSAMAX PO      Take 70 mg by mouth every 30 days        gabapentin 400 MG capsule    NEURONTIN     Take 400 mg by mouth 3 times daily        hydroxychloroquine 200 MG tablet    PLAQUENIL      Take 200 mg by mouth daily        inFLIXimab 100 MG injection    REMICADE     once        * insulin detemir 100 UNIT/ML injection    LEVEMIR     Inject Subcutaneous At Bedtime        * insulin detemir 100 UNIT/ML injection    LEVEMIR    3 mL    Inject 35 Units Subcutaneous At Bedtime    Type 2 diabetes mellitus with complication, with long-term current use of insulin (H)       * latanoprost 0.005 % ophthalmic solution    XALATAN    1 Bottle    Place 1 drop into the right eye At Bedtime    Acute angle-closure glaucoma of right eye       * latanoprost 0.005 % ophthalmic solution    XALATAN    1 Bottle    Place 1 drop into the right eye At Bedtime    Chronic primary angle-closure glaucoma of right eye, severe stage       LISINOPRIL PO      Take 10 mg by mouth daily        METOPROLOL TARTRATE PO      Take 25 mg by mouth daily        ofloxacin 0.3 % ophthalmic solution    OCUFLOX     Place 1 drop Into the left eye 2 times daily        polyethylene glycol powder    MIRALAX/GLYCOLAX     Take 1 capful by mouth daily        * prednisoLONE acetate 1 % ophthalmic susp    PRED FORTE    5 mL    Place 1 drop Into the left eye daily    Primary open angle glaucoma of both eyes, severe stage       * prednisoLONE acetate 1 % ophthalmic susp    PRED FORTE    5 mL    Place 1 drop Into the left eye daily    Primary open angle glaucoma of both eyes, severe stage       predniSONE 5 MG tablet    DELTASONE     Take 5 mg by mouth daily        PRILOSEC PO      Take 10 mg by mouth 2 times daily (before meals)        REGLAN PO      Take 5 mg by mouth        SIMVASTATIN PO      Take 10 mg by mouth At Bedtime        sucralfate 1 GM/10ML suspension    CARAFATE     Take 1 g by mouth 4 times daily        TRAVATAN Z 0.004 % ophthalmic solution   Generic drug:  travoprost (AKILAH Free)     3 Bottle    Place 1 drop Into the left eye At Bedtime    NVG (neovascular glaucoma), left, severe stage       ZOFRAN PO      Take 4 mg by mouth        * Notice:  This  list has 14 medication(s) that are the same as other medications prescribed for you. Read the directions carefully, and ask your doctor or other care provider to review them with you.

## 2017-11-04 LAB
ERYTHROCYTE [DISTWIDTH] IN BLOOD BY AUTOMATED COUNT: 13.1 % (ref 10–15)
GLUCOSE BLDC GLUCOMTR-MCNC: 114 MG/DL (ref 70–99)
GLUCOSE BLDC GLUCOMTR-MCNC: 122 MG/DL (ref 70–99)
GLUCOSE BLDC GLUCOMTR-MCNC: 148 MG/DL (ref 70–99)
GLUCOSE BLDC GLUCOMTR-MCNC: 185 MG/DL (ref 70–99)
GLUCOSE BLDC GLUCOMTR-MCNC: 189 MG/DL (ref 70–99)
GLUCOSE BLDC GLUCOMTR-MCNC: 213 MG/DL (ref 70–99)
GLUCOSE BLDC GLUCOMTR-MCNC: 83 MG/DL (ref 70–99)
HCT VFR BLD AUTO: 37.3 % (ref 35–47)
HGB BLD-MCNC: 12 G/DL (ref 11.7–15.7)
INR PPP: 1.11 (ref 0.86–1.14)
MCH RBC QN AUTO: 31.7 PG (ref 26.5–33)
MCHC RBC AUTO-ENTMCNC: 32.2 G/DL (ref 31.5–36.5)
MCV RBC AUTO: 98 FL (ref 78–100)
PLATELET # BLD AUTO: 124 10E9/L (ref 150–450)
RBC # BLD AUTO: 3.79 10E12/L (ref 3.8–5.2)
WBC # BLD AUTO: 5.2 10E9/L (ref 4–11)

## 2017-11-04 PROCEDURE — 85610 PROTHROMBIN TIME: CPT | Performed by: PHYSICIAN ASSISTANT

## 2017-11-04 PROCEDURE — 85027 COMPLETE CBC AUTOMATED: CPT | Performed by: PHYSICIAN ASSISTANT

## 2017-11-04 PROCEDURE — G0378 HOSPITAL OBSERVATION PER HR: HCPCS

## 2017-11-04 PROCEDURE — 99207 ZZC CDG-CODE CATEGORY CHANGED: CPT | Performed by: PHYSICIAN ASSISTANT

## 2017-11-04 PROCEDURE — 25000132 ZZH RX MED GY IP 250 OP 250 PS 637: Mod: GY | Performed by: PHYSICIAN ASSISTANT

## 2017-11-04 PROCEDURE — A9270 NON-COVERED ITEM OR SERVICE: HCPCS | Mod: GY | Performed by: PHYSICIAN ASSISTANT

## 2017-11-04 PROCEDURE — 99225 ZZC SUBSEQUENT OBSERVATION CARE,LEVEL II: CPT | Performed by: PHYSICIAN ASSISTANT

## 2017-11-04 PROCEDURE — 96372 THER/PROPH/DIAG INJ SC/IM: CPT

## 2017-11-04 PROCEDURE — 36415 COLL VENOUS BLD VENIPUNCTURE: CPT | Performed by: PHYSICIAN ASSISTANT

## 2017-11-04 PROCEDURE — 00000146 ZZHCL STATISTIC GLUCOSE BY METER IP

## 2017-11-04 RX ADMIN — TRAVOPROST OPHTHALMIC SOLUTION 1 DROP: 0.04 SOLUTION OPHTHALMIC at 22:10

## 2017-11-04 RX ADMIN — OMEPRAZOLE 20 MG: 20 CAPSULE, DELAYED RELEASE ORAL at 07:46

## 2017-11-04 RX ADMIN — BRIMONIDINE TARTRATE 1 DROP: 2 SOLUTION/ DROPS OPHTHALMIC at 20:04

## 2017-11-04 RX ADMIN — GABAPENTIN 600 MG: 300 CAPSULE ORAL at 07:46

## 2017-11-04 RX ADMIN — ACETAMINOPHEN 650 MG: 325 TABLET, FILM COATED ORAL at 03:24

## 2017-11-04 RX ADMIN — OXYCODONE HYDROCHLORIDE 5 MG: 5 TABLET ORAL at 20:04

## 2017-11-04 RX ADMIN — OXYCODONE HYDROCHLORIDE 5 MG: 5 TABLET ORAL at 14:21

## 2017-11-04 RX ADMIN — HYDROXYCHLOROQUINE SULFATE 200 MG: 200 TABLET, FILM COATED ENTERAL at 07:47

## 2017-11-04 RX ADMIN — DORZOLAMIDE HYDROCHLORIDE AND TIMOLOL MALEATE 1 DROP: 20; 5 SOLUTION/ DROPS OPHTHALMIC at 20:01

## 2017-11-04 RX ADMIN — LATANOPROST 1 DROP: 50 SOLUTION/ DROPS OPHTHALMIC at 22:09

## 2017-11-04 RX ADMIN — LISINOPRIL 10 MG: 10 TABLET ORAL at 07:46

## 2017-11-04 RX ADMIN — GABAPENTIN 600 MG: 300 CAPSULE ORAL at 14:21

## 2017-11-04 RX ADMIN — DORZOLAMIDE HYDROCHLORIDE AND TIMOLOL MALEATE 1 DROP: 20; 5 SOLUTION/ DROPS OPHTHALMIC at 07:53

## 2017-11-04 RX ADMIN — ACETAZOLAMIDE 250 MG: 250 TABLET ORAL at 07:46

## 2017-11-04 RX ADMIN — GABAPENTIN 600 MG: 300 CAPSULE ORAL at 20:04

## 2017-11-04 RX ADMIN — OXYCODONE HYDROCHLORIDE 5 MG: 5 TABLET ORAL at 03:24

## 2017-11-04 RX ADMIN — BRIMONIDINE TARTRATE 1 DROP: 2 SOLUTION/ DROPS OPHTHALMIC at 07:45

## 2017-11-04 RX ADMIN — METOPROLOL SUCCINATE 25 MG: 25 TABLET, EXTENDED RELEASE ORAL at 07:46

## 2017-11-04 RX ADMIN — ACETAZOLAMIDE 250 MG: 250 TABLET ORAL at 20:01

## 2017-11-04 ASSESSMENT — ACTIVITIES OF DAILY LIVING (ADL)
SWALLOWING: 0-->SWALLOWS FOODS/LIQUIDS WITHOUT DIFFICULTY
COGNITION: 0 - NO COGNITION ISSUES REPORTED
RETIRED_EATING: 0-->INDEPENDENT
TRANSFERRING: 0-->INDEPENDENT
RETIRED_COMMUNICATION: 0-->UNDERSTANDS/COMMUNICATES WITHOUT DIFFICULTY
DRESS: 0-->INDEPENDENT
TOILETING: 0-->INDEPENDENT
BATHING: 0-->INDEPENDENT
AMBULATION: 0-->INDEPENDENT
FALL_HISTORY_WITHIN_LAST_SIX_MONTHS: NO

## 2017-11-04 NOTE — PLAN OF CARE
Problem: Patient Care Overview  Goal: Plan of Care/Patient Progress Review  Outpatient/Observation goals to be met before discharge home:  -Diagnostic tests and consults completed and resulted : No, patient remains at eye clinic appointment. Awaiting further instructions at this time.  -Vital signs normal or at patient baseline : YES  -Safe disposition plan has been identified : NO, Pending work-up completion

## 2017-11-04 NOTE — PROGRESS NOTES
[facility] [place]    Internal Medicine Progress Note - Gold Service      Assessment & Plan   Connie Longoria is a 57 year old female admitted on 11/3/2017. She has a history of HTN, CHF, CAD c/b MI s/p CABG (2011), CVA (2007), DMII, RA, Neovascular Glaucoma of L eye and is admitted for acute angle - closure glaucoma of the R eye w/ associated vision loss.     Acute R Angle-Closure Glaucoma. Presented to Ophtho clinic 11/3 with 5 days of progressively decreased vision in R eye. H/o neovascular glaucoma of L eye, no remaining vision. Received 500mg acetazolamide and lopidine drops in clinic, and IOP improved 89-->68. Ophtho clinic visit 11/4 s/p injection of Avastin and anterior chamber tap to prevent increased IOP after injection. Vitals stable, reporting decreased vision in R eye and pain in b/l eyes and head.   - Drops for right eye:            - Brimonidine 0.2% BID            - Dorzolamide BID             - Timolol BID             - Latanoprost QHS  - Continue diamox 250 mg BID  - Continue travatan drops in left eye QHS  - Pain control with PRN tylenol, oxycodone  - Regular diet ok  - Ophtho clinic 11/6/17    DMII c/b Peripheral Neuropathy. Hgb A1C 6.8% in 10/2017. PTA on levemir 35u qHS.   - Levemir 35u per PTA dosing since eating regular diet  - BG checks TID AC, qHS, 0200  - Hypoglycemia protocol  - Continue gabapentin 600mg TID    HTN. PTA on metoprolol and lisinopril. BPs borderline elevated this admission.  - Continue metoprolol and lisinopril    CHF. Echo 12/2015 with mildly reduced LV function, LVEF ~45%; akinetic basal septal and inferior wall; mild hypokinesis of basal lateral wall; pseudonormalization of diastolic filling c/w diastolic dysfunction. Findings unchanged from prior study in 2013.    - Follow up with cardiology referral as previously recommended    CAD, H/o MI s/p CABG. Developed acute MI 6/2011, subsequently underwent coronary artery bypass graft x4. Most  recent cardiac stress testing 12/2015 with fixed inferior and fixed apical perfusion abnormalities, no inducible perfusion defects; mildly reduced LVEF of 41%, and abnormal septal motion likely 2/2 prior CABG. Currently denies chest pain or SOB.     Hx of CVA. Diagnosed with small non-hemorrhagic infarct of left thalamus 6/2007. No residual deficits. S/p left carotid endarterectomy 3/2015 for severe stenosis.    - Hold ASA       GERD. EGD 2/2017 with normal esophagus, mildly erythematous mucosa in antrum of stomach. Stomach biopsy with moderate inactive chronic gastritis and intestinal metaplasia, indefinite for dysplasia. Esophageal biopsy with esophagitis, consistent with reflux. Maintained on omeprazole PTA.  - Continue omeprazole 20 mg QD     RA. Receives remicade infusions every 8 weeks, also on daily plaquenil. No acute concerns.   - Continue plaquenil 200 mg QD    Diet: Regular Diet Adult  Fluids: PO  DVT Prophylaxis: Low Risk/Ambulatory with no VTE prophylaxis indicated, Pneumatic Compression Devices and Ambulate every shift  Code Status: Full Code    Disposition Plan   Expected discharge: Tomorrow, recommended to prior living arrangement once adequate pain management/ tolerating PO medications and safe disposition plan/ TCU bed available.     Entered: Jose J Moody 11/04/2017, 12:29 PM   Information in the above section will display in the discharge planner report.      The patient's care was discussed with the Attending Physician, Dr. Carbone, Bedside Nurse, Patient, Patient's Family and Ophthalmology Team.    Jose J Moody  Internal Medicine Staff Hospitalist Service  [facility]  Pager: [phone]  Please see sticky note for cross cover information    Interval History   Overnight did well. Seen after clinic procedure to R eye. Reports decreased vision in R eye since prior to procedure. L eye vision loss per baseline. Also reports bilateral eye pain and frontal HA. Pain is sharp.  Wondering about whether Dr. William is doing a procedure on her eye.      She denies chest pain, SOB, abdominal pain, diarrhea, constipation.    Data reviewed today: I reviewed all medications, new labs and imaging results over the last 24 hours. I personally reviewed no images or EKG's today.    Physical Exam   Vital Signs: Temp: 98.3  F (36.8  C) Temp src: Oral BP: 118/66 Pulse: 58   Resp: 16 SpO2: 99 % O2 Device: None (Room air)    Weight: 0 lbs 0 oz  General Appearance: A&O x 3. Appears mildly uncomfortable 2/2 pain.   Eyes: Closed during interview. R eye cloudy.  Respiratory: Normal effort on RA. Lungs CTAB, no wheezing.   Cardiovascular: RRR. S1, S2. No murmurs rubs or gallops.   GI: Abdomen soft, non-tender, non-distended. BS hypoactive.  Skin: No jaundice, rashes, lesions on exposed areas of skin.   Other: No peripheral edema.        Data   Data     Recent Labs  Lab 11/04/17  0642   WBC 5.2   HGB 12.0   MCV 98   *   INR 1.11     No results found for this or any previous visit (from the past 24 hour(s)).

## 2017-11-04 NOTE — PLAN OF CARE
Problem: Patient Care Overview  Goal: Plan of Care/Patient Progress Review  -Diagnostic tests and consults completed and resulted : YES  -Vital signs normal or at patient baseline : YES  -Safe disposition plan has been identified : NO

## 2017-11-04 NOTE — PLAN OF CARE
Problem: Patient Care Overview  Goal: Plan of Care/Patient Progress Review  Outpatient/Observation goals to be met before discharge home:  -Diagnostic tests and consults completed and resulted : No. Re-evaluation in ophthalmology clinic tomorrow at 9 AM per  physician's notes. Transport arranged.  -Vital signs normal or at patient baseline : Yes  -Safe disposition plan has been identified : Pending work-up completion  Adequate pain control with PRN analgesics.BG q4hrs, covered per sliding scale.NPO pending opth. Consult. Will continue to monitor.  /78 (BP Location: Right arm)  Pulse 59  Temp 98.2  F (36.8  C) (Oral)  Resp 16  SpO2 99%

## 2017-11-04 NOTE — PLAN OF CARE
Problem: Patient Care Overview  Goal: Plan of Care/Patient Progress Review  Outpatient/Observation goals to be met before discharge home:  -Diagnostic tests and consults completed and resulted : No. Re-evaluation in ophthalmology clinic tomorrow at 9 AM per physician's notes  -Vital signs normal or at patient baseline : Yes  -Safe disposition plan has been identified : Pending work-up completion  AVSS. Assistx1 with ambulation due to poor eyesight. Adequate pain control with PRN analgesics. Zofran for nausea with relief. Ophthalmic drops administered. Will continue to monitor .

## 2017-11-04 NOTE — PLAN OF CARE
Problem: Patient Care Overview  Goal: Plan of Care/Patient Progress Review  Outpatient/Observation goals to be met before discharge home:  -Diagnostic tests and consults completed and resulted : No. Re-evaluation in ophthalmology clinic at 9am.  -Vital signs normal or at patient baseline : YES  -Safe disposition plan has been identified : NO, Pending work-up completion  Adequate pain control with PRN analgesics. BG q4hrs, covered per sliding scale. NPO pending opth. Consult. Will continue to monitor.

## 2017-11-04 NOTE — PLAN OF CARE
Problem: Patient Care Overview  Goal: Plan of Care/Patient Progress Review  Outcome: No Change  Observation Goals:  PRIOR TO DISCHARGE     Comments:   -diagnostic tests and consults completed and resulted= No, eye appointment with eye doctor on Monday  -vital signs normal or at patient baseline=yes  -safe disposition plan has been identified= No   Nurse to notify provider when observation goals have been met and patient is ready for discharge.

## 2017-11-04 NOTE — H&P
[facility]    Internal Medicine History and Physical - Gold Service       Date of Admission:  11/3/2017    Assessment & Plan   Connie Longoria is a 57 year old female admitted on 11/3/2017. 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    Acute Right Angle-Closure Glaucoma. Presented to ophthalmology clinic 11/3 with 5 days of progressively decreased vision in right eye. IOP significantly elevated at 89 concerning for acute angle-closure glaucoma. Hx of neovascular glaucoma of left eye, no remaining vision. Received 500 mg acetazolamide and lopidine drops in clinic, transferred to [facility] ophthalmology clinic for further evaluation. IOP had improved to 68 with medication, and patient admitted to observation overnight given significant risk of complete vision loss. Plan for re-evaluation in clinic 11/4 at 9AM with possible procedure at that time. Currently reports no vision in right eye.  - Drops for right eye:   - Brimonidine 0.2% BID   - Dorzolamide BID    - Timolol BID   - Latanoprost QHS  - Continue diamox 250 mg BID  - Continue travatan drops in left eye QHS  - Pain control with PRN tylenol, oxycodone  - NPO @ midnight pending possible surgery  - CBC and INR ordered for AM prior to surgery  - Plan for re-evaluation in ophthalmology clinic tomorrow at 9 AM - will need transportation to [facility] 9th floor  -  consult to assist in transportation needs    DM Type II c/b Peripheral Neuropathy. HgbA1C 6.8% on 10/2017. Maintained on levemir 35 units QHS, although will reduce dose tonight given NPO with possible procedure tomorrow.  - Continue insulin - reduced dose of 25 units levemir  - BG checks q4h while NPO  - Medium SSI  - Hypoglycemia protocol  - Continue gabapentin 600 mg TID    CHF. Echo 12/2015 with mildly  reduced LV function, LVEF ~45%; akinetic basal septal and inferior wall; mild hypokinesis of basal lateral wall; pseudonormalization of diastolic filling c/w diastolic dysfunction. Findings unchanged from prior study in 2013. Has previously been evaluated by cardiology for pre-operative clearance although does not follow consistently. Cardiology referral placed recently by PCP, although patient reports missing her scheduled appointment this week. No LE edema on exam.  - Follow up with cardiology referral as previously recommended    HTN. Maintained on metoprolol and lisinopril PTA. BP's mildly elevated on admission - likely in setting of acute pain. Current /90.  - Continue metoprolol 25 mg QD, lisinopril 10 mg QD    CAD, Hx of MI. Developed acute MI 6/2011, subsequently underwent coronary artery bypass graft x4. Most recent cardiac stress testing 12/2015 with fixed inferior and fixed apical perfusion abnormalities, no inducible perfusion defects; mildly reduced LVEF of 41%, and abnormal septal motion likely 2/2 prior CABG. Currently denies chest pain or SOB.    Hx of CVA. Diagnosed with small non-hemorrhagic infarct of left thalamus 6/2007. No residual deficits. S/p left carotid endarterectomy 3/2015 for severe stenosis. Maintained on ASA PTA.  - Hold ASA overnight pending ?surgery    GERD. EGD 2/2017 with normal esophagus, mildly erythematous mucosa in antrum of stomach. Stomach biopsy with moderate inactive chronic gastritis and intestinal metaplasia, indefinite for dysplasia. Esophageal biopsy with esophagitis, consistent with reflux. Maintained on omeprazole PTA.  - Continue omeprazole 20 mg QD    RA. Receives remicade infusions every 8 weeks, also on daily plaquenil. No acute concerns.   - Continue plaquenil 200 mg QD    Diet: Moderate Consistent CHO Diet  Fluids: N/A  DVT Prophylaxis: Low Risk/Ambulatory with no VTE prophylaxis indicated  Code Status: Full Code    Disposition Plan   Expected discharge:  tomorrow; recommended to prior living arrangement once ophthalmology evaluation complete and safe dispoition plan established.     Entered: Lisa Sweeney 11/03/2017, 9:01 PM   Information in the above section will display in the discharge planner report.    The patient's care was discussed with the Attending Physician, Dr. Castorena.    Lisa Sweeney  Internal Medicine Staff Hospitalist Service  [facility]  Pager: [phone]  Please see sticky note for cross cover information    Attending Addendum:  10 point ROS is negative except as mentioned in the HPI  PMH, PSH, medications, SH, and FMH were reviewed and confirmed by myself    Labs and VS reviewed  Patient lying in bed, appears mild uncomfortable. Heart and lungs clear. Neuro exam otherwise non-focal.     I saw and evaluated this patient with DENISSE Gonzalez. I agree with his/her assessment of Connie. Plan for today is admit to observation for repeat opthalmology examination int he AM to determine of pressure will need operative intervention.     Jay Castorena MD     ______________________________________________________________________    Chief Complaint   Acute Angle-Closure Glaucoma of right eye    History is obtained from the patient and the patient's .    History of Present Illness   Connie Longoria is a 57 year old female admitted on 11/3/2017. 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    Patient reports developing progressive vision loss in her right eye over the past 5 days. This has been associated with intermittent headaches along the right side of her forehead and face. Has a history of glaucoma in her left eye for which she has undergone several surgeries. Currently no vision remains in her left eye. Presented to ophthalmology  clinic today for evaluation, and was noted to have an IOP of 89. Received dose of acetazolamide and lopidine with subsequent IOP of 68. Per ophthalmology notes, patient is at significant risk of losing the remaining vision in her right eye and it is critical patient returns to clinic tomorrow. Patient and  just moved to [place] and do not have reliable means of transportation. Utilize [employer] to get around, although this requires 24 hour notice. Currently, patient is in a significant amount of pain. States her headache has become more severe and is still located along the right side of her face and behind her eye. Currently is unable to see anything out of her right eye. Ate half of a sandwich upon arrival without nausea or vomiting. Otherwise denies fevers, chills, chest pain, SOB, abdominal pain, diarrhea, constipation, or peripheral edema.    Review of Systems   The 10 point Review of Systems is negative other than noted in the HPI.    Past Medical History    I have reviewed this patient's medical history and updated it with pertinent information if needed.   Past Medical History:   Diagnosis Date     Arthritis      Congestive heart failure, unspecified      Coronary artery disease      CVA (cerebral vascular accident) (H)      Diabetes (H)      Gastro-oesophageal reflux disease      Glaucoma      Heart attack      Hyperlipidemia      Hypertension      Renal disease     from diabetes and steroid use     Stented coronary artery      Steroid long-term use      Stroke (cerebrum) (H)      Unspecified cerebral artery occlusion with cerebral infarction      Past Surgical History   I have reviewed this patient's surgical history and updated it with pertinent information if needed.  Past Surgical History:   Procedure Laterality Date     AS CABG, ARTERY-VEIN, FOUR       CARDIAC SURGERY       COLONOSCOPY       EYE SURGERY Left      IMPLANT VALVE EYE Left 9/18/2015    Procedure: IMPLANT VALVE EYE;   Surgeon: Harlan Harris MD;  Location: [facility]     IMPLANT VALVE EYE Left 11/3/2015    Procedure: IMPLANT VALVE EYE;  Surgeon: Harlan Harris MD;  Location: [facility]     REVISE GLAUCOMA SHUNT Left 10/9/2015    Procedure: REVISE GLAUCOMA SHUNT;  Surgeon: Harlan Harris MD;  Location: [facility]     REVISE GLAUCOMA SHUNT Left 2/23/2016    Procedure: REVISE GLAUCOMA SHUNT;  Surgeon: Harlan Harris MD;  Location: [facility]     VASCULAR SURGERY      carodid endarectomy     Social History   Social History   Substance Use Topics     Smoking status: Current Every Day Smoker     Packs/day: 0.20     Types: Cigarettes     Smokeless tobacco: Never Used      Comment: #2 cigs / day     Alcohol use No     Family History   I have reviewed this patient's family history and updated it with pertinent information if needed.   No family history on file.    Prior to Admission Medications   Prior to Admission Medications   Prescriptions Last Dose Informant Patient Reported? Taking?   Alendronate Sodium (FOSAMAX PO)   Yes No   Sig: Take 70 mg by mouth every 30 days    Carboxymethylcell-Hypromellose 0.25-0.3 % GEL   No No   Sig: Place 1 Application Into the left eye 6 times daily   Carboxymethylcell-Hypromellose 0.25-0.3 % GEL   No No   Sig: Apply 0.5 inches to eye 4 times daily   LISINOPRIL PO   Yes No   Sig: Take 10 mg by mouth daily   METOPROLOL TARTRATE PO   Yes No   Sig: Take 25 mg by mouth daily   Metoclopramide HCl (REGLAN PO)   Yes No   Sig: Take 5 mg by mouth   Omeprazole (PRILOSEC PO)   Yes No   Sig: Take 10 mg by mouth 2 times daily (before meals)   Ondansetron HCl (ZOFRAN PO)   Yes No   Sig: Take 4 mg by mouth   SIMVASTATIN PO   Yes No   Sig: Take 10 mg by mouth At Bedtime   TRAVATAN Z 0.004 % ophthalmic solution   No No   Sig: Place 1 drop Into the left eye At Bedtime   acetaZOLAMIDE (DIAMOX) 250 MG tablet   No No   Sig: Take 1 tablet (250 mg) by mouth 2 times daily   aspirin 81 MG tablet   Yes No   Sig: Take 81 mg by mouth daily    atropine 1 % ophthalmic solution   No No   Sig: Place 1 drop Into the left eye daily   brimonidine (ALPHAGAN) 0.2 % ophthalmic solution   No No   Sig: Place 1 drop into the right eye 2 times daily   brimonidine (ALPHAGAN) 0.2 % ophthalmic solution   No No   Sig: Place 1 drop into the right eye 2 times daily   brimonidine-timolol (COMBIGAN) 0.2-0.5 % ophthalmic solution   No No   Sig: Place 1 drop Into the left eye 2 times daily   dorzolamide-timolol (COSOPT) 2-0.5 % ophthalmic solution   No No   Sig: Place 1 drop into the right eye 2 times daily   dorzolamide-timolol (COSOPT) 2-0.5 % ophthalmic solution   No No   Sig: Place 1 drop into the right eye 2 times daily   gabapentin (NEURONTIN) 400 MG capsule   Yes No   Sig: Take 400 mg by mouth 3 times daily   hydroxychloroquine (PLAQUENIL) 200 MG tablet   Yes No   Sig: Take 200 mg by mouth daily   inFLIXimab (REMICADE) 100 MG injection   Yes No   Sig: once   insulin detemir (LEVEMIR) 100 UNIT/ML VIAL   Yes No   Sig: Inject Subcutaneous At Bedtime   latanoprost (XALATAN) 0.005 % ophthalmic solution   No No   Sig: Place 1 drop into the right eye At Bedtime   latanoprost (XALATAN) 0.005 % ophthalmic solution   No No   Sig: Place 1 drop into the right eye At Bedtime   ofloxacin (OCUFLOX) 0.3 % ophthalmic solution   Yes No   Sig: Place 1 drop Into the left eye 2 times daily   polyethylene glycol (MIRALAX/GLYCOLAX) powder   Yes No   Sig: Take 1 capful by mouth daily   predniSONE (DELTASONE) 5 MG tablet   Yes No   Sig: Take 5 mg by mouth daily   prednisoLONE acetate (PRED FORTE) 1 % ophthalmic susp   No No   Sig: Place 1 drop Into the left eye daily   prednisoLONE acetate (PRED FORTE) 1 % ophthalmic susp   No No   Sig: Place 1 drop Into the left eye daily   sucralfate (CARAFATE) 1 GM/10ML suspension   Yes No   Sig: Take 1 g by mouth 4 times daily      Facility-Administered Medications: None     Allergies   Allergies   Allergen Reactions     Atorvastatin      Effexor  [Venlafaxine]      Wellbutrin [Bupropion]        Physical Exam   Vital Signs: Temp: 98.5  F (36.9  C) Temp src: Oral BP: 187/90 Pulse: 69     SpO2: 100 % O2 Device: None (Room air)    Weight: 0 lbs 0 oz    General Appearance:  female curled in fetal position, intermittently crying. Appears uncomfortable.  Eyes: Closed throughout majority of interview. Briefly opened both eyes on request; right eye appears somewhat cloudy.   HEENT: NC/AT. Moist mucous membranes.  Respiratory: Respiratory effort normal on RA. Lungs CTAB without rales, rhonchi, or wheezing.  Cardiovascular: RRR, S1/S2. No murmurs, rubs, or gallops.  GI: Abdomen soft, non-distended, non-tender. Bowel sounds hypoactive.  Skin: No jaundice, rashes, or lesions evident on exposed skin.  Musculoskeletal/Extremities: No peripheral edema. Pedal pulses intact.  Neurologic: A&O x 3. Passive movement of all extremities while laying in bed. No focal deficits.  Psychiatric: Tearful, upset. Responds appropriately to all questions.    Data   Data reviewed today: I reviewed all medications, new labs and imaging results over the last 24 hours. I personally reviewed no images or EKG's today.    Data   No lab results found in last 7 days.

## 2017-11-05 VITALS
OXYGEN SATURATION: 100 % | DIASTOLIC BLOOD PRESSURE: 61 MMHG | HEART RATE: 55 BPM | WEIGHT: 159.2 LBS | RESPIRATION RATE: 16 BRPM | TEMPERATURE: 98.1 F | SYSTOLIC BLOOD PRESSURE: 103 MMHG | BODY MASS INDEX: 27.33 KG/M2

## 2017-11-05 LAB
CREAT SERPL-MCNC: 1.07 MG/DL (ref 0.52–1.04)
GFR SERPL CREATININE-BSD FRML MDRD: 53 ML/MIN/1.7M2
GLUCOSE BLDC GLUCOMTR-MCNC: 109 MG/DL (ref 70–99)
GLUCOSE BLDC GLUCOMTR-MCNC: 76 MG/DL (ref 70–99)
GLUCOSE BLDC GLUCOMTR-MCNC: 87 MG/DL (ref 70–99)
PLATELET # BLD AUTO: 114 10E9/L (ref 150–450)

## 2017-11-05 PROCEDURE — 25000132 ZZH RX MED GY IP 250 OP 250 PS 637: Mod: GY | Performed by: PHYSICIAN ASSISTANT

## 2017-11-05 PROCEDURE — A9270 NON-COVERED ITEM OR SERVICE: HCPCS | Mod: GY | Performed by: PHYSICIAN ASSISTANT

## 2017-11-05 PROCEDURE — 00000146 ZZHCL STATISTIC GLUCOSE BY METER IP

## 2017-11-05 PROCEDURE — 99217 ZZC OBSERVATION CARE DISCHARGE: CPT | Performed by: PHYSICIAN ASSISTANT

## 2017-11-05 PROCEDURE — 85049 AUTOMATED PLATELET COUNT: CPT | Performed by: INTERNAL MEDICINE

## 2017-11-05 PROCEDURE — 82565 ASSAY OF CREATININE: CPT | Performed by: INTERNAL MEDICINE

## 2017-11-05 PROCEDURE — 36415 COLL VENOUS BLD VENIPUNCTURE: CPT | Performed by: INTERNAL MEDICINE

## 2017-11-05 PROCEDURE — G0378 HOSPITAL OBSERVATION PER HR: HCPCS

## 2017-11-05 RX ORDER — ATROPINE SULFATE 10 MG/ML
1 SOLUTION/ DROPS OPHTHALMIC 2 TIMES DAILY
Qty: 2 ML | Refills: 0 | Status: SHIPPED | OUTPATIENT
Start: 2017-11-05 | End: 2018-01-03

## 2017-11-05 RX ORDER — POLYETHYLENE GLYCOL 3350 17 G/17G
17 POWDER, FOR SOLUTION ORAL DAILY
Status: DISCONTINUED | OUTPATIENT
Start: 2017-11-05 | End: 2017-11-05 | Stop reason: HOSPADM

## 2017-11-05 RX ADMIN — ACETAMINOPHEN 650 MG: 325 TABLET, FILM COATED ORAL at 06:52

## 2017-11-05 RX ADMIN — DORZOLAMIDE HYDROCHLORIDE AND TIMOLOL MALEATE 1 DROP: 20; 5 SOLUTION/ DROPS OPHTHALMIC at 08:26

## 2017-11-05 RX ADMIN — GABAPENTIN 600 MG: 300 CAPSULE ORAL at 08:23

## 2017-11-05 RX ADMIN — OMEPRAZOLE 20 MG: 20 CAPSULE, DELAYED RELEASE ORAL at 08:24

## 2017-11-05 RX ADMIN — HYDROXYCHLOROQUINE SULFATE 200 MG: 200 TABLET, FILM COATED ENTERAL at 08:24

## 2017-11-05 RX ADMIN — LISINOPRIL 10 MG: 10 TABLET ORAL at 08:24

## 2017-11-05 RX ADMIN — POLYETHYLENE GLYCOL 3350 17 G: 17 POWDER, FOR SOLUTION ORAL at 10:50

## 2017-11-05 RX ADMIN — ACETAZOLAMIDE 250 MG: 250 TABLET ORAL at 08:23

## 2017-11-05 RX ADMIN — BRIMONIDINE TARTRATE 1 DROP: 2 SOLUTION/ DROPS OPHTHALMIC at 08:23

## 2017-11-05 ASSESSMENT — PAIN DESCRIPTION - DESCRIPTORS: DESCRIPTORS: HEADACHE

## 2017-11-05 NOTE — PLAN OF CARE
Problem: Patient Care Overview  Goal: Plan of Care/Patient Progress Review  Outcome: No Change  Outpatient/Observation goals to be met before discharge home:     -diagnostic tests and consults completed and resulted - YES, ex eye appt tomorrow scheduled  -vital signs normal or at patient baseline - YES  -safe disposition plan has been identified - YES, currently care coordinator is setting up ride

## 2017-11-05 NOTE — DISCHARGE SUMMARY
[facility], [place]    Internal Medicine Discharge Summary- Gold Service    Date of Admission:  11/3/2017  Date of Discharge:  11/5/2017  1:30 PM  Discharging Provider: Jose J Moody PA-C; Dr. Stewart Carbone  Discharge Team: Gold 2    Discharge Diagnoses   Acute R Angle Closure Glaucoma  DMII c/b Peripheral Neuropathy  HTN  CHF  CAD   H/o MI s/p CABG  Hx of CVA  GERD  RA    Follow-ups Needed After Discharge   F/u in Ophthalmology clinic 11/6 w/ Dr. Tavares Villagomez  F/u with Cardiology as scheduled  F/u with PCP for Diabetes & HTN management as scheduled    Hospital Course   Connie Longoria was admitted on 11/3/2017 for acute angle closure glaucoma of the R eye.  The following problems were addressed during her hospitalization:    Acute R Angle-Closure Glaucoma. Presented to Ophtho clinic 11/3 with 5 days of progressively decreased vision in R eye. H/o neovascular glaucoma of L eye, no remaining vision. Received 500mg acetazolamide and lopidine drops in clinic, and IOP improved 89-->68. Ophtho clinic visit 11/4 s/p injection of Avastin and anterior chamber tap to prevent increased IOP after injection. Vitals stable, pain and vision improved on day of discharge. Discharged with the following eye drops:  - Drops for right eye    - Brimonidine 0.2% BID   - Dorzolamide BID    - Timolol BID   - Latanoprost QHS   - Atropine BID in b/l eyes  - Continue diamox 250 mg BID  - Continue travatan drops in left eye QHS  - Ophtho clinic 11/6/17     DMII c/b Peripheral Neuropathy. Hgb A1C 6.8% in 10/2017. PTA on levemir 35u qHS, continued. Continued gabapentin 600mg TID for neuropathy.     HTN. Continued PTA metoprolol and lisinopril. BPs stable this admission. Recommend PCP f/u due to low HR.     CHF. Echo 12/2015 with mildly reduced LV function, LVEF ~45%; akinetic basal septal and inferior wall; mild hypokinesis of basal lateral wall; pseudonormalization of diastolic filling c/w diastolic  dysfunction. Findings unchanged from prior study in 2013. Recommend follow up with cardiology referral as previously recommended     CAD, H/o MI s/p CABG. Developed acute MI 6/2011, subsequently underwent coronary artery bypass graft x4. Most recent cardiac stress testing 12/2015 with fixed inferior and fixed apical perfusion abnormalities, no inducible perfusion defects; mildly reduced LVEF of 41%, and abnormal septal motion likely 2/2 prior CABG. Denied chest pain or SOB.      Hx of CVA. Diagnosed with small non-hemorrhagic infarct of left thalamus 6/2007. No residual deficits. S/p left carotid endarterectomy 3/2015 for severe stenosis. Continued ASA on discharge.      GERD. EGD 2/2017 with normal esophagus, mildly erythematous mucosa in antrum of stomach. Stomach biopsy with moderate inactive chronic gastritis and intestinal metaplasia, indefinite for dysplasia. Esophageal biopsy with esophagitis, consistent with reflux. Continued PTA omeprazole.      RA. Receives remicade infusions every 8 weeks, also on daily plaquenil. No acute concerns. Continued plaquenil 200 mg QD.    Consultations This Hospital Stay   None      Code Status   Full Code    Time Spent on this Encounter   I, Jose J Moody, personally saw the patient today and spent greater than 30 minutes discharging this patient.       Jose J Moody  Internal Medicine Staff Hospitalist Service  [facility]  Pager: [phone]  ______________________________________________________________________    Physical Exam   Vital Signs: Temp: 98.1  F (36.7  C) Temp src: Oral BP: 103/61 Pulse: 55   Resp: 16 SpO2: 100 % O2 Device: None (Room air)    Weight: 159 lbs 3.2 oz    General Appearance: A&O x 3. Appears mildly uncomfortable 2/2 pain.   Eyes: Closed during interview. R eye cloudy.  Respiratory: Normal effort on RA. Lungs CTAB, no wheezing.   Cardiovascular: RRR. S1, S2. No murmurs rubs or gallops.   GI: Abdomen soft, non-tender,  non-distended. BS hypoactive.  Skin: No jaundice, rashes, lesions on exposed areas of skin.   Other: No peripheral edema.                 Significant Results and Procedures   Most Recent 3 CBC's:  Recent Labs   Lab Test  11/05/17   0847  11/04/17   0642   WBC   --   5.2   HGB   --   12.0   MCV   --   98   PLT  114*  124*   Most Recent 3 BMP's:  Recent Labs   Lab Test  11/05/17   0847   CR  1.07*     Most Recent 3 INR's:  Recent Labs   Lab Test  11/04/17   0642   INR  1.11        Primary Care Physician   Aliya Carbajal    Discharge Disposition   Discharged to home  Condition at discharge: Stable    Discharge Orders     Reason for your hospital stay   You were hospitalized for acute glaucoma. You had a procedure done by Ophthalmology, which was to remove fluid from your eye and perform an injection in your R eye. You need to return to clinic on Monday 11/6 as scheduled by Ophthalmology.     Adult [facility] Follow-up and recommended labs and tests   Follow up with Dr. Tavares Villagomez , at [facility], Monday 11/6/17 at 2:30pm  for eye follow up. They will determine what the plan for other surgeries are.    Clinic Address:    [place] [address]    [employer] ride set up for this appointment.  [employer] will pick you up at 12:22PM at home to take you to 2:30 appointment.  They will pick you up again from appointment at 4:30pm to take you home.      In the future if you need after hours service through [employer], you may call the [facility] service provider directly:  [phone]        Follow up with PCP within 7 days for hospital follow up and chronic medical management. Recommend repeat CBC, CMP, BG check, BP check    Appointments on [place] and/or [facility] (with [facility] or [facility] provider or service). Call [phone] if you haven't heard regarding these appointments within 7 days of discharge.     Activity   Your activity upon discharge:  activity as tolerated and no driving or operating heavy machinery     Monitor and record   blood glucose 4 times a day, before meals and at bedtime     When to contact your care team   Call your PCP or return to ED for temperatures > 100.7 degrees, worsening or changing pain, uncontrolled vomiting or inability to tolerate oral intake, new or worsening diarrhea, new or worsening shortness of breath, decreased urine output, yellowing of the eyes or skin, confusion, weakness, blood in urine or stools.     Full Code     Diet   Follow this diet upon discharge:      Regular Diet Adult       Discharge Medications   Discharge Medication List as of 11/5/2017 12:58 PM      CONTINUE these medications which have CHANGED    Details   insulin detemir (LEVEMIR) 100 UNIT/ML injection Inject 35 Units Subcutaneous At Bedtime, Disp-3 mL, R-0, Historical      atropine 1 % ophthalmic solution Place 1 drop into both eyes 2 times daily, Disp-2 mL, R-0, E-Prescribe         CONTINUE these medications which have NOT CHANGED    Details   dorzolamide-timolol (COSOPT) 2-0.5 % ophthalmic solution Place 1 drop into the right eye 2 times daily, Disp-1 Bottle, R-0, E-Prescribe      brimonidine (ALPHAGAN) 0.2 % ophthalmic solution Place 1 drop into the right eye 2 times daily, Disp-1 Bottle, R-0, E-Prescribe      latanoprost (XALATAN) 0.005 % ophthalmic solution Place 1 drop into the right eye At Bedtime, Disp-1 Bottle, R-0, E-Prescribe      acetaZOLAMIDE (DIAMOX) 250 MG tablet Take 1 tablet (250 mg) by mouth 2 times daily, Disp-30 tablet, R-0, E-Prescribe      prednisoLONE acetate (PRED FORTE) 1 % ophthalmic susp Place 1 drop Into the left eye daily, Disp-5 mL, R-0, E-Prescribe      TRAVATAN Z 0.004 % ophthalmic solution Place 1 drop Into the left eye At Bedtime, Disp-3 Bottle, R-1, BEATRICE, E-Prescribe      brimonidine-timolol (COMBIGAN) 0.2-0.5 % ophthalmic solution Place 1 drop Into the left eye 2 times daily, Disp-10 mL, R-3, E-Prescribe       Carboxymethylcell-Hypromellose 0.25-0.3 % GEL Place 1 Application Into the left eye 6 times daily, Disp-30 mL, R-9, E-Prescribe      sucralfate (CARAFATE) 1 GM/10ML suspension Take 1 g by mouth 4 times daily, Historical      hydroxychloroquine (PLAQUENIL) 200 MG tablet Take 200 mg by mouth daily, Historical      inFLIXimab (REMICADE) 100 MG injection once, Historical      Metoclopramide HCl (REGLAN PO) Take 5 mg by mouth, Historical      Ondansetron HCl (ZOFRAN PO) Take 4 mg by mouth, Historical      polyethylene glycol (MIRALAX/GLYCOLAX) powder Take 1 capful by mouth daily, Historical      SIMVASTATIN PO Take 10 mg by mouth At Bedtime, Historical      LISINOPRIL PO Take 10 mg by mouth daily, Historical      gabapentin (NEURONTIN) 400 MG capsule Take 400 mg by mouth 3 times daily, Historical      METOPROLOL TARTRATE PO Take 25 mg by mouth daily, Historical      Omeprazole (PRILOSEC PO) Take 10 mg by mouth 2 times daily (before meals), Historical      aspirin 81 MG tablet Take 81 mg by mouth daily, Historical      predniSONE (DELTASONE) 5 MG tablet Take 5 mg by mouth daily, Historical      Alendronate Sodium (FOSAMAX PO) Take 70 mg by mouth every 30 days , Historical         STOP taking these medications       ofloxacin (OCUFLOX) 0.3 % ophthalmic solution Comments:   Reason for Stopping:             Allergies   Allergies   Allergen Reactions     Atorvastatin      Effexor [Venlafaxine]      Wellbutrin [Bupropion]

## 2017-11-05 NOTE — PLAN OF CARE
Problem: Patient Care Overview  Goal: Discharge Needs Assessment  -diagnostic tests and consults completed and resulted= No, eye appointment with eye doctor on Monday  -vital signs normal or at patient baseline=yes  -safe disposition plan has been identified= No       Pt a/o x 4; VSS, pt denies dizziness, n/v, or SOB; pt reports mild headache, refused pain medications, repositioned and turned off all lights. Pt sleeping comfortably in bed.     Blood sugar 76 @0400.

## 2017-11-05 NOTE — PLAN OF CARE
Problem: Patient Care Overview  Goal: Discharge Needs Assessment  -diagnostic tests and consults completed and resulted= No, eye appointment with eye doctor on Monday  -vital signs normal or at patient baseline=yes  -safe disposition plan has been identified= No      Pt a/o x 4; VSS, pt denies dizziness, n/v, or SOB; pt reports mild headache, refused pain medications, repositioned and turned off all lights. Pt sleeping comfortably in bed.

## 2017-11-05 NOTE — PLAN OF CARE
Problem: Patient Care Overview  Goal: Plan of Care/Patient Progress Review  Outcome: Adequate for Discharge Date Met:  11/05/17  Outpatient/Observation goals to be met before discharge home:      -diagnostic tests and consults completed and resulted - YES, ex eye appt tomorrow scheduled  -vital signs normal or at patient baseline - YES  -safe disposition plan has been identified - YES     Belongings packed up by pt and send with her. Pt declined wheelchair, will ambulate to front lobby. Meds picked up by staff from discharge pharm, eye gtts taken by pt. Discharge teaching complete, pt denies further questions. No PIV in place that needed to be removed.

## 2017-11-05 NOTE — PLAN OF CARE
Problem: Patient Care Overview  Goal: Plan of Care/Patient Progress Review  Outcome: No Change  Observation Goals:  PRIOR TO DISCHARGE     Comments:   -diagnostic tests and consults completed and resulted= No, eye appointment with eye doctor on Monday  -vital signs normal or at patient baseline=yes  -safe disposition plan has been identified= No   Nurse to notify provider when observation goals have been met and patient is ready for discharge      Patient C/O right eye pain, given Oxycodone with relief x 2.  Left eye is blind. Right eye is blurry but improving per patient.  Tolerating Regular diet with good appetite.  Up with assist of 1.  Plan: Possible discharge to home tomorrow but patient stated that she might stay until Monday for eye doctor appointment.

## 2017-11-05 NOTE — PROGRESS NOTES
Care Coordinator Progress Note     Admission Date/Time:  11/3/2017  Attending MD:  Stewart Carbone MD     Data  Chart reviewed, discussed with interdisciplinary team.   Patient was admitted for: Type 2 diabetes mellitus with complication, with long-term current use of insulin (H).    Concerns with insurance coverage for discharge needs: Difficulty scheduling rides through [employer].    Current Living Situation: Patient lives with spouse.    Support System: Supportive and Involved -     Services Involved: No previous home or community services    Transportation: [employer] [phone] (option 7 after hours)     [employer] [email or web address]    Barriers to Discharge: physical impairment (legally blind)    Coordination of Care and Referrals: Provided patient/family with options for transportation home today and to and from tomorrow's opthalmology appointment.  Attempted to arrange both rides with [employer],but after hours support was unable to schedule tomorrow's ride and declined to schedule today's hospital discharge ride because they have a different address on file than current.  I was able to schedule transport to and from tomorrow's appointment at 2:30pm in PWB with Opht[facility] with [employer] with a 12:22pm pick-up time and 4:30pm return pick-up time.  Met with patient to update and she was agreeable to this plan. Discussed other options, including [employer]'s same-day cab service and [employer]  Transportation for the future.      Patient reported today that her  was getting a ride from [employer] to the hospital to accompany her home on discharge.  I requested she try to get a ride home with a friend from the hospital today, but offered a cab voucher if she is unsuccessful.  Updated 6D RN as well and awaiting decision re: voucher.  D/w LANE.      Assessment  Patient with some disability at baseline with worsening vision d/t acute eye condition.  Need for some assistance with coordination of transportation for medical appointments with short notice.       Plan  Anticipated Discharge Date:  11/5/2017  Anticipated Discharge Plan:  Home, family assist, and return to Ophtho clinic tomorrow via [employer].    Nelda Palmer, RN, PHN, ACM  RN Care Coordinator   [address] [address]   [email or web address]   Casual Employee - Weekend Coverage only  To contact weekend RNCC, dial * [phone] and enter pager number [phone] at prompt.  This pager can not be contacted by text page or outside line.

## 2017-11-06 ENCOUNTER — TRANSFERRED RECORDS (OUTPATIENT)
Dept: HEALTH INFORMATION MANAGEMENT | Facility: CLINIC | Age: 57
End: 2017-11-06

## 2017-11-06 ENCOUNTER — OFFICE VISIT (OUTPATIENT)
Dept: OPHTHALMOLOGY | Facility: CLINIC | Age: 57
End: 2017-11-06
Attending: OPHTHALMOLOGY
Payer: MEDICARE

## 2017-11-06 DIAGNOSIS — H40.53X3 NEOVASCULAR GLAUCOMA OF BOTH EYES, SEVERE STAGE: Primary | ICD-10-CM

## 2017-11-06 DIAGNOSIS — H40.2213 CHRONIC PRIMARY ANGLE-CLOSURE GLAUCOMA OF RIGHT EYE, SEVERE STAGE: ICD-10-CM

## 2017-11-06 PROCEDURE — 99213 OFFICE O/P EST LOW 20 MIN: CPT | Mod: ZF

## 2017-11-06 RX ORDER — ACETAZOLAMIDE 250 MG/1
250 TABLET ORAL 2 TIMES DAILY
Qty: 30 TABLET | Refills: 0 | Status: SHIPPED | OUTPATIENT
Start: 2017-11-06 | End: 2017-12-13

## 2017-11-06 RX ORDER — DORZOLAMIDE HCL 20 MG/ML
1 SOLUTION/ DROPS OPHTHALMIC 2 TIMES DAILY
COMMUNITY
End: 2018-01-03

## 2017-11-06 RX ORDER — TRAVOPROST OPHTHALMIC SOLUTION 0.04 MG/ML
1 SOLUTION OPHTHALMIC AT BEDTIME
COMMUNITY
End: 2018-01-03

## 2017-11-06 ASSESSMENT — TONOMETRY
OD_IOP_MMHG: 25
IOP_METHOD: TONOPEN
OS_IOP_MMHG: 12

## 2017-11-06 ASSESSMENT — REFRACTION_WEARINGRX
OD_CYLINDER: SPHERE
OS_CYLINDER: SPHERE
OD_SPHERE: +2.50
OD_SPHERE: PLANO
SPECS_TYPE: SVL
OD_SPHERE: +2.00
OS_SPHERE: +2.50
OS_SPHERE: PLANO
OS_SPHERE: +2.00

## 2017-11-06 ASSESSMENT — CONF VISUAL FIELD
OD_INFERIOR_NASAL_RESTRICTION: 3
OS_INFERIOR_NASAL_RESTRICTION: 1
OS_SUPERIOR_TEMPORAL_RESTRICTION: 1
OS_INFERIOR_TEMPORAL_RESTRICTION: 1
OS_SUPERIOR_NASAL_RESTRICTION: 1

## 2017-11-06 ASSESSMENT — EXTERNAL EXAM - LEFT EYE: OS_EXAM: NORMAL

## 2017-11-06 ASSESSMENT — VISUAL ACUITY
OD_SC: 20/60
OS_SC: NLP
OD_PH_SC: 20/40
OD_SC+: -1
METHOD: SNELLEN - LINEAR

## 2017-11-06 ASSESSMENT — SLIT LAMP EXAM - LIDS: COMMENTS: NORMAL

## 2017-11-06 ASSESSMENT — EXTERNAL EXAM - RIGHT EYE: OD_EXAM: NORMAL

## 2017-11-06 ASSESSMENT — CUP TO DISC RATIO: OD_RATIO: 0.4

## 2017-11-06 NOTE — PATIENT INSTRUCTIONS
Brimonidine (purple top) twice daily in the both eyes  Cosopt (blue top) twice daily in the both eyes  Travatan (aqua top) nightly in Both eyes  Diamox 250 mg (pill) twice daily

## 2017-11-06 NOTE — NURSING NOTE
Chief Complaints and History of Present Illnesses   Patient presents with     Glaucoma Follow Up     HPI    Affected eye(s):  Both, Right   Symptoms:     Blurred vision   No floaters   Flashes         Do you have eye pain now?:  No      Comments:  Glaucoma follow up.    MARIA ESTHER Crooks 2:47 PM 11/06/2017

## 2017-11-06 NOTE — MR AVS SNAPSHOT
After Visit Summary   11/6/2017    Connie Longoria    MRN: [id number]           Patient Information     Date Of Birth          1960        Visit Information        Provider Department      11/6/2017 2:30 PM Marshal Calderon MD [facility] Clinic        Today's Diagnoses     Chronic primary angle-closure glaucoma of right eye, severe stage          Care Instructions    Brimonidine (purple top) twice daily in the both eyes  Cosopt (blue top) twice daily in the both eyes  Travatan (aqua top) nightly in Both eyes  Diamox 250 mg (pill) twice daily          Follow-ups after your visit        Your next 10 appointments already scheduled     Nov 08, 2017  9:00 AM CST   RETURN GLAUCOMA with Bethany William MD   [facility] [place] ([facility])    [facility] [address] [facility]  [address]  [facility] [address]   [phone]            Nov 09, 2017  9:00 AM CST   Post-Op with Bethany William MD   [facility] ([facility])    [address]  [address] [facility]  [address] [address]  [facility] [address]   [phone]            Jan 31, 2018  9:45 AM CST   Return Visit with Bethany William MD   [facility] [place] ([facility])    [facility]  [address]  [facility] [address]   [phone]              Who to contact     Please call your clinic at [phone] to:    Ask questions about your health    Make or cancel appointments    Discuss your medicines    Learn about your test results    Speak to your doctor   If you have compliments or concerns about an experience at your clinic, or if you wish to file a complaint, please contact [facility] Patient Relations at [phone] or email us at [email or web address]         Additional Information About Your Visit        MyChart Information     [employer] is an electronic gateway that  provides easy, online access to your medical records. With [employer], you can request a clinic appointment, read your test results, renew a prescription or communicate with your care team.     To sign up for [employer] visit the website at [email or web address]   You will be asked to enter the access code listed below, as well as some personal information. Please follow the directions to create your username and password.     Your access code is: [email or web address]  Expires: 2018  2:41 PM     Your access code will  in 90 days. If you need help or a new code, please contact your [facility] or call [phone] for assistance.        Care EveryWhere ID     This is your Care EveryWhere ID. This could be used by other organizations to access your [place] medical records  [id number]         Blood Pressure from Last 3 Encounters:   17 103/61   16 128/62   11/03/15 135/78    Weight from Last 3 Encounters:   17 72.2 kg (159 lb 3.2 oz)   16 67.1 kg (148 lb)   11/03/15 68.5 kg (151 lb)              Today, you had the following     No orders found for display         Where to get your medicines      These medications were sent to [id number] - [facility] [address] AT [facility] [address]  [address], [facility] [address]    Hours:  24-hours Phone:  [phone]     acetaZOLAMIDE 250 MG tablet          Primary Care Provider Office Phone # Fax #    Aliya See JEFFERY Carbajal [phone]       [facility] [address] [address] [employer] [address]        Equal Access to Services     TEETEE RICCI : Hadii octavia leonard Soanthony, waraymondda luqadaha, qaybta kaalmada adegeorgeyada, nikole díaz. So [facility] [phone].    ATENCIÓN: Si habla español, tiene a ron disposición servicios gratuitos de asistencia lingüística. Llame al [phone].    We comply with applicable federal civil rights laws and  [place] laws. We do not discriminate on the basis of race, color, national origin, age, disability, sex, sexual orientation, or gender identity.            Thank you!     Thank you for choosing EYE CLINIC  for your care. Our goal is always to provide you with excellent care. Hearing back from our patients is one way we can continue to improve our services. Please take a few minutes to complete the written survey that you may receive in the mail after your visit with us. Thank you!             Your Updated Medication List - Protect others around you: Learn how to safely use, store and throw away your medicines at www.disposemymeds.org.          This list is accurate as of: 11/6/17  4:31 PM.  Always use your most recent med list.                   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, severe stage       aspirin 81 MG tablet      Take 81 mg by mouth daily        atropine 1 % ophthalmic solution     2 mL    Place 1 drop into both eyes 2 times daily    NVG (neovascular glaucoma), left, severe stage       * BRIMONIDINE TARTRATE OP      Apply to eye 2 times daily        * brimonidine 0.2 % ophthalmic solution    ALPHAGAN    1 Bottle    Place 1 drop into the right eye 2 times daily    Acute angle-closure glaucoma of right eye       brimonidine-timolol 0.2-0.5 % ophthalmic solution    COMBIGAN    10 mL    Place 1 drop Into the left eye 2 times daily    NVG (neovascular glaucoma), left, severe stage       Carboxymethylcell-Hypromellose 0.25-0.3 % Gel     30 mL    Place 1 Application Into the left eye 6 times daily    NVG (neovascular glaucoma), left, severe stage       dorzolamide 2 % ophthalmic solution    TRUSOPT     Place 1 drop into the right eye 2 times daily        dorzolamide-timolol 2-0.5 % ophthalmic solution    COSOPT    1 Bottle    Place 1 drop  into the right eye 2 times daily    Acute angle-closure glaucoma of right eye       FOSAMAX PO      Take 70 mg by mouth every 30 days        gabapentin 400 MG capsule    NEURONTIN     Take 400 mg by mouth 3 times daily        hydroxychloroquine 200 MG tablet    PLAQUENIL     Take 200 mg by mouth daily        inFLIXimab 100 MG injection    REMICADE     once        insulin detemir 100 UNIT/ML injection    LEVEMIR    3 mL    Inject 35 Units Subcutaneous At Bedtime    Type 2 diabetes mellitus with complication, with long-term current use of insulin (H)       latanoprost 0.005 % ophthalmic solution    XALATAN    1 Bottle    Place 1 drop into the right eye At Bedtime    Acute angle-closure glaucoma of right eye       LISINOPRIL PO      Take 10 mg by mouth daily        METOPROLOL TARTRATE PO      Take 25 mg by mouth daily        polyethylene glycol powder    MIRALAX/GLYCOLAX     Take 1 capful by mouth daily        prednisoLONE acetate 1 % ophthalmic susp    PRED FORTE    5 mL    Place 1 drop Into the left eye daily    Primary open angle glaucoma of both eyes, severe stage       predniSONE 5 MG tablet    DELTASONE     Take 5 mg by mouth daily        PRILOSEC PO      Take 10 mg by mouth 2 times daily (before meals)        REGLAN PO      Take 5 mg by mouth        SIMVASTATIN PO      Take 10 mg by mouth At Bedtime        sucralfate 1 GM/10ML suspension    CARAFATE     Take 1 g by mouth 4 times daily        TIMOLOL MALEATE PO      Take by mouth 2 times daily        * travoprost (AKILAH Free) 0.004 % ophthalmic solution    TRAVATAN Z     Place 1 drop into both eyes At Bedtime        * TRAVATAN Z 0.004 % ophthalmic solution   Generic drug:  travoprost (AKILAH Free)     3 Bottle    Place 1 drop Into the left eye At Bedtime    NVG (neovascular glaucoma), left, severe stage       ZOFRAN PO      Take 4 mg by mouth        * Notice:  This list has 4 medication(s) that are the same as other medications prescribed for you. Read the directions  carefully, and ask your doctor or other care provider to review them with you.

## 2017-11-06 NOTE — PROGRESS NOTES
CC: neovascular glaucoma    HPI: Connie Longoria is a 57 year old female who presents for evaluation of increased IOP in the right eye with neovascular glaucoma. Patient was admitted to the hospital over the weekend due to her inability to get a ride to the clinic for f/u. She received Avastin injection and AC tap on 2 days ago. She notes improved vision and less pain . Since discharge, she has not received her diamox and she is confused regarding the eye drop regimen. Notes minor irritation upper eyelid.    POHx:  NVG, OU  PDR, OS    Current Eye Medications:   Brimonidine twice daily in the right eye  Dorzolamide twice daily in the right eye  Timolol twice daily in the right eye  Latanoprost QHS right eye  Travatan QHS in Both eyes      Review of Testing:  NA    Assessment & Plan   Connie Longoria is a 57 year old female with the following diagnoses:     1. Neovascular glaucoma, right eye:  Patient will do the following drop regimen:  Brimonidine (purple) twice daily in the both eyes  Cosopt (Dorzolamide-timolol) twice daily in the both eyes  Travatan (aqua) QHS in Both eyes  Diamox 250 mg BID    Plan for surgery Wednesday afternoon with Dr. William, to see him in the am prior to surgery    Patient discussed with Dr. Nataliia Calderon MD  PGY3, Dept of Ophthalmology  Pager [phone]    I read and agree with above.  Bethany William MD

## 2017-11-08 ENCOUNTER — OFFICE VISIT (OUTPATIENT)
Dept: OPHTHALMOLOGY | Facility: CLINIC | Age: 57
End: 2017-11-08

## 2017-11-08 ENCOUNTER — TRANSFERRED RECORDS (OUTPATIENT)
Dept: HEALTH INFORMATION MANAGEMENT | Facility: CLINIC | Age: 57
End: 2017-11-08

## 2017-11-08 DIAGNOSIS — Z96.1 PSEUDOPHAKIA OF BOTH EYES: ICD-10-CM

## 2017-11-08 DIAGNOSIS — H40.53X3 NEOVASCULAR GLAUCOMA OF BOTH EYES, SEVERE STAGE: Primary | ICD-10-CM

## 2017-11-08 ASSESSMENT — CONF VISUAL FIELD
OD_INFERIOR_NASAL_RESTRICTION: 3
OS_INFERIOR_NASAL_RESTRICTION: 1
OS_SUPERIOR_NASAL_RESTRICTION: 1
OS_INFERIOR_TEMPORAL_RESTRICTION: 1
OS_SUPERIOR_TEMPORAL_RESTRICTION: 1

## 2017-11-08 ASSESSMENT — GONIOSCOPY
OD_TEMPORAL: 0
OD_INFERIOR: 0
OD_SUPERIOR: 0
OD_NASAL: 0

## 2017-11-08 ASSESSMENT — VISUAL ACUITY
METHOD: SNELLEN - LINEAR
OD_SC+: +2
OS_SC: NLP
OD_SC: 20/50

## 2017-11-08 ASSESSMENT — CUP TO DISC RATIO: OD_RATIO: 0.2

## 2017-11-08 ASSESSMENT — TONOMETRY
IOP_METHOD: APPLANATION
OS_IOP_MMHG: 08
OD_IOP_MMHG: 19

## 2017-11-08 ASSESSMENT — SLIT LAMP EXAM - LIDS: COMMENTS: NORMAL

## 2017-11-08 ASSESSMENT — EXTERNAL EXAM - RIGHT EYE: OD_EXAM: NORMAL

## 2017-11-08 ASSESSMENT — EXTERNAL EXAM - LEFT EYE: OS_EXAM: NORMAL

## 2017-11-08 NOTE — NURSING NOTE
Chief Complaints and History of Present Illnesses   Patient presents with     Follow Up For     s/p NVG (neovascular glaucoma), left, severe stage (Primary Dx);...     HPI    Affected eye(s):  Both   Symptoms:     No blurred vision   No decreased vision   No floaters   No flashes      Duration:  4 months   Frequency:  Constant       Do you have eye pain now?:  No      Comments:  States va is the same since last visit.  Brimonidine twice daily in the right eye last used 7:00 AM  Dorzolamide twice daily in the right eye  last used 7:10 AM  Timolol twice daily in the right eye last used 9:30 PM  Latanoprost QHS right eye last used 9:40 PM  Travatan QHS in Both eyes last used 9:45 PM  Ken Cho  9:04 AM November 8, 2017

## 2017-11-08 NOTE — PATIENT INSTRUCTIONS
Patient will continue on Travatan which is a teal top drop at bedtime in the RIGHT EYE ONLY, Cosopt (Timolol/Dorzolamide) which is a blue top drop 2x/day (12 hours apart) in the RIGHT EYE ONLY, Alphagan (Brimonidine) which is a purple top drop 3x/day (8 hours apart) in the RIGHT EYE ONLY, and Diamox 250mg pills 3x/day.    Patient will also continue Genteal gel 4-6x/day in the left eye. A long discussion of the risks, benefits, and alternatives including potential treatment and management options were had with patient and a decision was made to proceed with emergent tube surgery and PRP (laser) in the right eye.

## 2017-11-08 NOTE — PROGRESS NOTES
1)NVG OU -- s/p TSCPC OS (4/25/16), s/p Tube OS x2 (8/15 and last in 11/15 by Dr. Harris), H/O tube revision x2 (last on 2/23/16), H/O tube retraction and conj retraction per old notes, etiology of vision loss -- K pachy: 618/--  Tmax: 60+ for both eyes HVF:   CDR: OD:0.4 and OS:No view HRT/OCT: OD:RNFL WNL   FHX of Glc: No  Gonio:Closed Intolerant to: Asthma/COPD: No, on po BB but smokes Steroid Use: Yes, po pred for RA Kidney Stones: Mild CRI per patient (PCP is Dr. Carbajal at [facility] in [place]) Sulfa Allergy: No IOP targets: OD:L-M20s and OS:Comfort -- IOP good on Diamox  -- pt adamantly does not want to remove the left eye if possible -- eye now non-painful  2)DM c PDR -- s/p Avastin OD -- following with Mandie Fitzgerald -- received IV Avastin OD in hospital   3)RA on Plaquenil and Remicade -- following with Rheumatologist [facility]  4)PCIOL OU  5)NLP OS   6)H/O CAD s/p CABG per old notes  7)Ptosis OS -- pt may be interested in ptosis repair OS    IOP OD Good on Diamox as a temporizing measure.  Angle completely closed on gonio.  NVI vessels appear mostly resolved.      Patient will continue on Travatan which is a teal top drop at bedtime in the RIGHT EYE ONLY, Cosopt (Timolol/Dorzolamide) which is a blue top drop 2x/day (12 hours apart) in the RIGHT EYE ONLY, Alphagan (Brimonidine) which is a purple top drop 3x/day (8 hours apart) in the RIGHT EYE ONLY, and Diamox 250mg pills 3x/day.    Patient will also continue Genteal gel 4-6x/day in the left eye. A long discussion of the risks, benefits, and alternatives including potential treatment and management options were had with patient and a decision was made to proceed with emergent tube surgery and PRP (laser) in the right eye.      Attending Physician Attestation:  Complete documentation of historical and exam elements from today's encounter can be found in the full encounter summary report (not reduplicated in this progress note). I  personally obtained the chief complaint(s) and history of present illness.  I confirmed and edited as necessary the review of systems, past medical/surgical history, family history, social history, and examination findings as documented by others; and I examined the patient myself. I personally reviewed the relevant tests, images, and reports as documented above. I formulated and edited as necessary the assessment and plan and discussed the findings and management plan with the patient and family.  - Bethany William MD

## 2017-11-08 NOTE — MR AVS SNAPSHOT
After Visit Summary   11/8/2017    Connie Longoria    MRN: [id number]           Patient Information     Date Of Birth          1960        Visit Information        Provider Department      11/8/2017 9:00 AM Bethany William MD [facility]        Today's Diagnoses     Neovascular glaucoma of both eyes, severe stage    -  1    Pseudophakia of both eyes          Care Instructions    Patient will continue on Travatan which is a teal top drop at bedtime in the RIGHT EYE ONLY, Cosopt (Timolol/Dorzolamide) which is a blue top drop 2x/day (12 hours apart) in the RIGHT EYE ONLY, Alphagan (Brimonidine) which is a purple top drop 3x/day (8 hours apart) in the RIGHT EYE ONLY, and Diamox 250mg pills 3x/day.    Patient will also continue Genteal gel 4-6x/day in the left eye. A long discussion of the risks, benefits, and alternatives including potential treatment and management options were had with patient and a decision was made to proceed with emergent tube surgery and PRP (laser) in the right eye.          Follow-ups after your visit        Your next 10 appointments already scheduled     Nov 09, 2017  9:00 AM CST   Post-Op with Bethany William MD   Eye Clinic ([facility])    [address]  [address] [facility]  9th [address] Clin [address]  [facility] [address]   [phone]            Feb 07, 2018 10:30 AM CST   Return Visit with Bethany William MD   [facility] ([facility])    [facility]  [address] [facility] [address]  [facility] [address] [phone]              Who to contact     Please call your clinic at [phone] to:    Ask questions about your health    Make or cancel appointments    Discuss your medicines    Learn about your test results    Speak to your doctor   If you have compliments or concerns about an experience at your clinic, or if you wish to file a complaint, please contact  [facility] Patient Relations at [phone] or email us at [email or web address]         Additional Information About Your Visit        Pluckhart Information     [employer] is an electronic gateway that provides easy, online access to your medical records. With [employer], you can request a clinic appointment, read your test results, renew a prescription or communicate with your care team.     To sign up for [employer] visit the website at [email or web address]   You will be asked to enter the access code listed below, as well as some personal information. Please follow the directions to create your username and password.     Your access code is: [email or web address]  Expires: 2018  2:41 PM     Your access code will  in 90 days. If you need help or a new code, please contact your [facility] or call [phone] for assistance.        Care EveryWhere ID     This is your Care EveryWhere ID. This could be used by other organizations to access your [place] medical records  [id number]         Blood Pressure from Last 3 Encounters:   17 103/61   16 128/62   11/03/15 135/78    Weight from Last 3 Encounters:   17 72.2 kg (159 lb 3.2 oz)   16 67.1 kg (148 lb)   11/03/15 68.5 kg (151 lb)              Today, you had the following     No orders found for display       Primary Care Provider Office Phone # Fax #    Aliya See JEFFERY Carbajal [phone]       [facility] [id number] [place] [address]        Equal Access to Services     TEETEE RICCI AH: Hadkaelyn Lemus, waraymondda luqadaha, qaalexandriata kaalmanikole hdz. So [facility] [phone].    ATENCIÓN: Si habla español, tiene a ron disposición servicios gratuitos de asistencia lingüística. Nidia castro [phone].    We comply with applicable federal civil rights laws and [place] laws. We do not discriminate on the  basis of race, color, national origin, age, disability, sex, sexual orientation, or gender identity.            Thank you!     Thank you for choosing [facility]  for your care. Our goal is always to provide you with excellent care. Hearing back from our patients is one way we can continue to improve our services. Please take a few minutes to complete the written survey that you may receive in the mail after your visit with us. Thank you!             Your Updated Medication List - Protect others around you: Learn how to safely use, store and throw away your medicines at www.disposemymeds.org.          This list is accurate as of: 11/8/17 11:39 AM.  Always use your most recent med list.                   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, severe stage       aspirin 81 MG tablet      Take 81 mg by mouth daily        atropine 1 % ophthalmic solution     2 mL    Place 1 drop into both eyes 2 times daily    NVG (neovascular glaucoma), left, severe stage       * BRIMONIDINE TARTRATE OP      Apply to eye 2 times daily        * brimonidine 0.2 % ophthalmic solution    ALPHAGAN    1 Bottle    Place 1 drop into the right eye 2 times daily    Acute angle-closure glaucoma of right eye       brimonidine-timolol 0.2-0.5 % ophthalmic solution    COMBIGAN    10 mL    Place 1 drop Into the left eye 2 times daily    NVG (neovascular glaucoma), left, severe stage       Carboxymethylcell-Hypromellose 0.25-0.3 % Gel     30 mL    Place 1 Application Into the left eye 6 times daily    NVG (neovascular glaucoma), left, severe stage       dorzolamide 2 % ophthalmic solution    TRUSOPT     Place 1 drop into the right eye 2 times daily        dorzolamide-timolol 2-0.5 % ophthalmic solution    COSOPT    1 Bottle    Place 1 drop into the right  eye 2 times daily    Acute angle-closure glaucoma of right eye       FOSAMAX PO      Take 70 mg by mouth every 30 days        gabapentin 400 MG capsule    NEURONTIN     Take 400 mg by mouth 3 times daily        hydroxychloroquine 200 MG tablet    PLAQUENIL     Take 200 mg by mouth daily        inFLIXimab 100 MG injection    REMICADE     once        insulin detemir 100 UNIT/ML injection    LEVEMIR    3 mL    Inject 35 Units Subcutaneous At Bedtime    Type 2 diabetes mellitus with complication, with long-term current use of insulin (H)       latanoprost 0.005 % ophthalmic solution    XALATAN    1 Bottle    Place 1 drop into the right eye At Bedtime    Acute angle-closure glaucoma of right eye       LISINOPRIL PO      Take 10 mg by mouth daily        METOPROLOL TARTRATE PO      Take 25 mg by mouth daily        polyethylene glycol powder    MIRALAX/GLYCOLAX     Take 1 capful by mouth daily        prednisoLONE acetate 1 % ophthalmic susp    PRED FORTE    5 mL    Place 1 drop Into the left eye daily    Primary open angle glaucoma of both eyes, severe stage       predniSONE 5 MG tablet    DELTASONE     Take 5 mg by mouth daily        PRILOSEC PO      Take 10 mg by mouth 2 times daily (before meals)        REGLAN PO      Take 5 mg by mouth        SIMVASTATIN PO      Take 10 mg by mouth At Bedtime        sucralfate 1 GM/10ML suspension    CARAFATE     Take 1 g by mouth 4 times daily        TIMOLOL MALEATE PO      Take by mouth 2 times daily        * travoprost (AKILAH Free) 0.004 % ophthalmic solution    TRAVATAN Z     Place 1 drop into both eyes At Bedtime        * TRAVATAN Z 0.004 % ophthalmic solution   Generic drug:  travoprost (AKILAH Free)     3 Bottle    Place 1 drop Into the left eye At Bedtime    NVG (neovascular glaucoma), left, severe stage       ZOFRAN PO      Take 4 mg by mouth        * Notice:  This list has 4 medication(s) that are the same as other medications prescribed for you. Read the directions carefully, and  ask your doctor or other care provider to review them with you.

## 2017-11-09 ENCOUNTER — OFFICE VISIT (OUTPATIENT)
Dept: OPHTHALMOLOGY | Facility: CLINIC | Age: 57
End: 2017-11-09
Attending: OPHTHALMOLOGY
Payer: MEDICARE

## 2017-11-09 DIAGNOSIS — H40.53X3 NEOVASCULAR GLAUCOMA OF BOTH EYES, SEVERE STAGE: Primary | ICD-10-CM

## 2017-11-09 DIAGNOSIS — H40.51X3 NEOVASCULAR GLAUCOMA OF RIGHT EYE, SEVERE STAGE: Primary | ICD-10-CM

## 2017-11-09 ASSESSMENT — VISUAL ACUITY
METHOD: SNELLEN - LINEAR
OS_SC: NLP
OD_SC: 20/40-

## 2017-11-09 ASSESSMENT — EXTERNAL EXAM - RIGHT EYE: OD_EXAM: NORMAL

## 2017-11-09 ASSESSMENT — CUP TO DISC RATIO: OD_RATIO: 0.2

## 2017-11-09 ASSESSMENT — TONOMETRY
OD_IOP_MMHG: 18
IOP_METHOD: APPLANATION

## 2017-11-09 ASSESSMENT — EXTERNAL EXAM - LEFT EYE: OS_EXAM: NORMAL

## 2017-11-09 ASSESSMENT — SLIT LAMP EXAM - LIDS: COMMENTS: NORMAL

## 2017-11-09 NOTE — PROGRESS NOTES
1)NVG OU -- s/p Tube OD (11/8/17), s/p TSCPC OS (4/25/16), s/p Tube OS x2 (8/15 and last in 11/15 by Dr. Harris), H/O tube revision x2 (last on 2/23/16), H/O tube retraction and conj retraction per old notes, etiology of vision loss -- K pachy: 618/--  Tmax: 60+ for both eyes HVF:   CDR: OD:0.4 and OS:No view HRT/OCT: OD:RNFL WNL   FHX of Glc: No  Gonio:Closed Intolerant to: Asthma/COPD: No, on po BB but smokes Steroid Use: Yes, po pred for RA Kidney Stones: Mild CRI per patient (PCP is Dr. Carbajal at [facility] in [place]) Sulfa Allergy: No IOP targets: OD:L-M20s and OS:Comfort -- IOP good on Diamox  -- pt adamantly does not want to remove the left eye if possible -- eye now non-painful  2)DM c PDR -- s/p PRP OD (11/8/17), s/p Avastin OD -- following with Mandie Fitzgerald -- received IV Avastin OD in hospital   3)RA on Plaquenil and Remicade -- following with Rheumat[facility]  4)PCIOL OU  5)NLP OS   6)H/O CAD s/p CABG per old notes  7)Ptosis OS -- pt may be interested in ptosis repair OS    Patient will discontinue and hold onto the Travatan which is a teal top drop, Cosopt (Timolol/Dorzolamide) which is a blue top drop, Alphagan (Brimonidine) which is a purple top drop, and Diamox 250mg pills.  DO NOT USE THESE DROPS OR PILLS.    Patient will also continue Genteal gel 4-6x/day in the left eye.      No heavy lifting, bending, stooping, straining, rubbing the eye, or getting water in the eye.  Please wear protective eyewear over the eye at all times including glasses during the day and the protective shield at bedtime.  Patient will return to clinic in 1 week with repeat evaluation.  Patient will also follow up with retina clinic in 4 weeks.      Use the following drops (RIGHT EYE ONLY):  Antibiotic --  Ciprofloxacin: 4x/day until finished (use for at least 5-7 days after surgery)    Steroid -- Pred Forte/Prednisolone Acetate: every 2 hours while awake    Please be sure to call if you have  any decrease in vision, increase in pain, flashing lights, redness, or a lot of drainage.

## 2017-11-09 NOTE — MR AVS SNAPSHOT
After Visit Summary   11/9/2017    Connie Longoria    MRN: [id number]           Patient Information     Date Of Birth          1960        Visit Information        Provider Department      11/9/2017 9:00 AM Bethany [facility] MD [facility]        Today's Diagnoses     Neovascular glaucoma of both eyes, severe stage    -  1      Care Instructions    Patient will discontinue and hold onto the Travatan which is a teal top drop, Cosopt (Timolol/Dorzolamide) which is a blue top drop, Alphagan (Brimonidine) which is a purple top drop, and Diamox 250mg pills.  DO NOT USE THESE DROPS OR PILLS.    Patient will also continue Genteal gel 4-6x/day in the left eye.      No heavy lifting, bending, stooping, straining, rubbing the eye, or getting water in the eye.  Please wear protective eyewear over the eye at all times including glasses during the day and the protective shield at bedtime.  Patient will return to clinic in 1 week with repeat evaluation.  Patient will also follow up with retina clinic in 4 weeks.      Use the following drops (RIGHT EYE ONLY):  Antibiotic --  Ciprofloxacin: 4x/day until finished (use for at least 5-7 days after surgery)    Steroid -- Pred Forte/Prednisolone Acetate: every 2 hours while awake    Please be sure to call if you have any decrease in vision, increase in pain, flashing lights, redness, or a lot of drainage.              Follow-ups after your visit        Your next 10 appointments already scheduled     Feb 07, 2018 10:30 AM CST   Return Visit with Bethany William MD   [facility] ([facility])    [facility]  [address] [address] [phone]              Who to contact     Please call your clinic at [phone] to:    Ask questions about your health    Make or cancel appointments    Discuss your medicines    Learn about your test results    Speak to your doctor   If you have  compliments or concerns about an experience at your clinic, or if you wish to file a complaint, please contact [facility] Patient Relations at [phone] or email us at [email or web address]         Additional Information About Your Visit        [employer] Information     [employer] is an electronic gateway that provides easy, online access to your medical records. With [employer], you can request a clinic appointment, read your test results, renew a prescription or communicate with your care team.     To sign up for [employer] visit the website at [email or web address]   You will be asked to enter the access code listed below, as well as some personal information. Please follow the directions to create your username and password.     Your access code is: [email or web address]  Expires: 2018  2:41 PM     Your access code will  in 90 days. If you need help or a new code, please contact your [facility] or call [phone] for assistance.        Care EveryWhere ID     This is your Care EveryWhere ID. This could be used by other organizations to access your [place] medical records  [id number]         Blood Pressure from Last 3 Encounters:   17 103/61   16 128/62   11/03/15 135/78    Weight from Last 3 Encounters:   17 72.2 kg (159 lb 3.2 oz)   16 67.1 kg (148 lb)   11/03/15 68.5 kg (151 lb)              Today, you had the following     No orders found for display       Primary Care Provider Office Phone # Fax #    Aliya See JEFFERY Carbajal [phone]       [facility] [address] [facility] [address]        Equal Access to Services     TEETEE RICCI : Alejandra Lemus, wacurtis lugermán, tammy kaalmada estefanía, nikole díaz. So [facility] [phone].    ATENCIÓN: Si habla español, tiene a ron disposición servicios gratuitos de asistencia lingüística. Llame al  [phone].    We comply with applicable federal civil rights laws and [place] laws. We do not discriminate on the basis of race, color, national origin, age, disability, sex, sexual orientation, or gender identity.            Thank you!     Thank you for choosing EYE CLINIC  for your care. Our goal is always to provide you with excellent care. Hearing back from our patients is one way we can continue to improve our services. Please take a few minutes to complete the written survey that you may receive in the mail after your visit with us. Thank you!             Your Updated Medication List - Protect others around you: Learn how to safely use, store and throw away your medicines at www.disposemymeds.org.          This list is accurate as of: 11/9/17  9:38 AM.  Always use your most recent med list.                   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, severe stage       aspirin 81 MG tablet      Take 81 mg by mouth daily        atropine 1 % ophthalmic solution     2 mL    Place 1 drop into both eyes 2 times daily    NVG (neovascular glaucoma), left, severe stage       * BRIMONIDINE TARTRATE OP      Apply to eye 2 times daily        * brimonidine 0.2 % ophthalmic solution    ALPHAGAN    1 Bottle    Place 1 drop into the right eye 2 times daily    Acute angle-closure glaucoma of right eye       brimonidine-timolol 0.2-0.5 % ophthalmic solution    COMBIGAN    10 mL    Place 1 drop Into the left eye 2 times daily    NVG (neovascular glaucoma), left, severe stage       Carboxymethylcell-Hypromellose 0.25-0.3 % Gel     30 mL    Place 1 Application Into the left eye 6 times daily    NVG (neovascular glaucoma), left, severe stage       dorzolamide 2 % ophthalmic solution    TRUSOPT     Place 1 drop into the right eye 2 times daily         dorzolamide-timolol 2-0.5 % ophthalmic solution    COSOPT    1 Bottle    Place 1 drop into the right eye 2 times daily    Acute angle-closure glaucoma of right eye       FOSAMAX PO      Take 70 mg by mouth every 30 days        gabapentin 400 MG capsule    NEURONTIN     Take 400 mg by mouth 3 times daily        hydroxychloroquine 200 MG tablet    PLAQUENIL     Take 200 mg by mouth daily        inFLIXimab 100 MG injection    REMICADE     once        insulin detemir 100 UNIT/ML injection    LEVEMIR    3 mL    Inject 35 Units Subcutaneous At Bedtime    Type 2 diabetes mellitus with complication, with long-term current use of insulin (H)       latanoprost 0.005 % ophthalmic solution    XALATAN    1 Bottle    Place 1 drop into the right eye At Bedtime    Acute angle-closure glaucoma of right eye       LISINOPRIL PO      Take 10 mg by mouth daily        METOPROLOL TARTRATE PO      Take 25 mg by mouth daily        polyethylene glycol powder    MIRALAX/GLYCOLAX     Take 1 capful by mouth daily        prednisoLONE acetate 1 % ophthalmic susp    PRED FORTE    5 mL    Place 1 drop Into the left eye daily    Primary open angle glaucoma of both eyes, severe stage       predniSONE 5 MG tablet    DELTASONE     Take 5 mg by mouth daily        PRILOSEC PO      Take 10 mg by mouth 2 times daily (before meals)        REGLAN PO      Take 5 mg by mouth        SIMVASTATIN PO      Take 10 mg by mouth At Bedtime        sucralfate 1 GM/10ML suspension    CARAFATE     Take 1 g by mouth 4 times daily        TIMOLOL MALEATE PO      Take by mouth 2 times daily        * travoprost (AKILAH Free) 0.004 % ophthalmic solution    TRAVATAN Z     Place 1 drop into both eyes At Bedtime        * TRAVATAN Z 0.004 % ophthalmic solution   Generic drug:  travoprost (AKILAH Free)     3 Bottle    Place 1 drop Into the left eye At Bedtime    NVG (neovascular glaucoma), left, severe stage       ZOFRAN PO      Take 4 mg by mouth        * Notice:  This list has 4  medication(s) that are the same as other medications prescribed for you. Read the directions carefully, and ask your doctor or other care provider to review them with you.

## 2017-11-09 NOTE — PATIENT INSTRUCTIONS
Patient will discontinue and hold onto the Travatan which is a teal top drop, Cosopt (Timolol/Dorzolamide) which is a blue top drop, Alphagan (Brimonidine) which is a purple top drop, and Diamox 250mg pills.  DO NOT USE THESE DROPS OR PILLS.    Patient will also continue Genteal gel 4-6x/day in the left eye.      No heavy lifting, bending, stooping, straining, rubbing the eye, or getting water in the eye.  Please wear protective eyewear over the eye at all times including glasses during the day and the protective shield at bedtime.  Patient will return to clinic in 1 week with repeat evaluation.  Patient will also follow up with retina clinic in 4 weeks.      Use the following drops (RIGHT EYE ONLY):  Antibiotic --  Ciprofloxacin: 4x/day until finished (use for at least 5-7 days after surgery)    Steroid -- Pred Forte/Prednisolone Acetate: every 2 hours while awake    Please be sure to call if you have any decrease in vision, increase in pain, flashing lights, redness, or a lot of drainage.

## 2017-11-16 ENCOUNTER — OFFICE VISIT (OUTPATIENT)
Dept: OPHTHALMOLOGY | Facility: CLINIC | Age: 57
End: 2017-11-16

## 2017-11-16 DIAGNOSIS — H40.51X3 NEOVASCULAR GLAUCOMA OF RIGHT EYE, SEVERE STAGE: Primary | ICD-10-CM

## 2017-11-16 ASSESSMENT — CONF VISUAL FIELD: OD_NORMAL: 1

## 2017-11-16 ASSESSMENT — VISUAL ACUITY
METHOD: SNELLEN - LINEAR
OD_SC: 20/100
OS_SC: NLP
OD_PH_SC: 20/60+2

## 2017-11-16 ASSESSMENT — SLIT LAMP EXAM - LIDS: COMMENTS: NORMAL

## 2017-11-16 ASSESSMENT — EXTERNAL EXAM - LEFT EYE: OS_EXAM: NORMAL

## 2017-11-16 ASSESSMENT — EXTERNAL EXAM - RIGHT EYE: OD_EXAM: NORMAL

## 2017-11-16 ASSESSMENT — TONOMETRY
IOP_METHOD: APPLANATION
OD_IOP_MMHG: 13

## 2017-11-16 NOTE — PROGRESS NOTES
Assessment/Plan  (H40.51X3,  H57.8) Neovascular glaucoma of right eye, severe stage  (primary encounter diagnosis)  Comment: Treatment plan discussed with Dr. William  Plan:  Educated patient and  on clinical findings. Continue use of prednisolone q2h OD while awake and Ciprofloxacin qid OD. Return with Dr. William on 11/29 at [facility] location for post-operative visit, or sooner if symptoms present. Patient scheduled for retina visit with Dr. Albarran on 11/21.        Complete documentation of historical and exam elements from today's encounter can  be found in the full encounter summary report (not reduplicated in this progress  note). I personally obtained the chief complaint(s) and history of present illness. I  confirmed and edited as necessary the review of systems, past medical/surgical  history, family history, social history, and examination findings as documented by  others; and I examined the patient myself. I personally reviewed the relevant tests,  images, and reports as documented above. I formulated and edited as necessary the  assessment and plan and discussed the findings and management plan with the  patient and family.    Jhonathan Chin, OD, FAAO

## 2017-11-16 NOTE — MR AVS SNAPSHOT
After Visit Summary   11/16/2017    Connie Longoria    MRN: [id number]           Patient Information     Date Of Birth          1960        Visit Information        Provider Department      11/16/2017 9:40 AM Jhonathan Chin OD [facility]         Follow-ups after your visit        Your next 10 appointments already scheduled     Nov 29, 2017 10:00 AM CST   RETURN GLAUCOMA with Bethany William MD   [facility] ([facility])    [facility]  [address] [facility] [address]  [facility] [address]   [phone]            Feb 07, 2018 10:30 AM CST   Return Visit with Bethany William MD   [facility] ([facility])    [facility]  [address] [facility] [address]  [facility] [address]   [phone]              Who to contact     Please call your clinic at [phone] to:    Ask questions about your health    Make or cancel appointments    Discuss your medicines    Learn about your test results    Speak to your doctor   If you have compliments or concerns about an experience at your clinic, or if you wish to file a complaint, please contact [facility] Patient Relations at [phone] or email us at [email or web address]         Additional Information About Your Visit        MyChart Information     Infor is an electronic gateway that provides easy, online access to your medical records. With Infor, you can request a clinic appointment, read your test results, renew a prescription or communicate with your care team.     To sign up for Platypus Platformt visit the website at [email or web address] | [email or web address]   You will be asked to enter the access code listed below, as well as some personal information. Please follow the directions to create your username and password.     Your access code is: [email or web address]  Expires: 2/1/2018  2:41  PM     Your access code will  in 90 days. If you need help or a new code, please contact your [facility] or call [phone] for assistance.        Care EveryWhere ID     This is your Care EveryWhere ID. This could be used by other organizations to access your [place] medical records  [id number]         Blood Pressure from Last 3 Encounters:   17 103/61   16 128/62   11/03/15 135/78    Weight from Last 3 Encounters:   17 72.2 kg (159 lb 3.2 oz)   16 67.1 kg (148 lb)   11/03/15 68.5 kg (151 lb)              Today, you had the following     No orders found for display       Primary Care Provider Office Phone # Fax #    Aliya See JEFFERY Carbajal [phone]       [address] [address]        Equal Access to Services     TEETEE RICCI : Hadii aad ku hadasho Soomaali, waaxda luqadaha, qaybta kaalmada adeegyada, waxay idiin hayryleyn kirt yancey . So [facility] [phone].    ATENCIÓN: Si habla español, tiene a ron disposición servicios gratuitos de asistencia lingüística. Llame al [phone].    We comply with applicable federal civil rights laws and [place] laws. We do not discriminate on the basis of race, color, national origin, age, disability, sex, sexual orientation, or gender identity.            Thank you!     Thank you for choosing [facility]  for your care. Our goal is always to provide you with excellent care. Hearing back from our patients is one way we can continue to improve our services. Please take a few minutes to complete the written survey that you may receive in the mail after your visit with us. Thank you!             Your Updated Medication List - Protect others around you: Learn how to safely use, store and throw away your medicines at www.disposemymeds.org.          This list is accurate as of: 17 10:00 AM.  Always use your most recent med list.                    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, severe stage       aspirin 81 MG tablet      Take 81 mg by mouth daily        atropine 1 % ophthalmic solution     2 mL    Place 1 drop into both eyes 2 times daily    NVG (neovascular glaucoma), left, severe stage       * BRIMONIDINE TARTRATE OP      Apply to eye 2 times daily        * brimonidine 0.2 % ophthalmic solution    ALPHAGAN    1 Bottle    Place 1 drop into the right eye 2 times daily    Acute angle-closure glaucoma of right eye       brimonidine-timolol 0.2-0.5 % ophthalmic solution    COMBIGAN    10 mL    Place 1 drop Into the left eye 2 times daily    NVG (neovascular glaucoma), left, severe stage       Carboxymethylcell-Hypromellose 0.25-0.3 % Gel     30 mL    Place 1 Application Into the left eye 6 times daily    NVG (neovascular glaucoma), left, severe stage       dorzolamide 2 % ophthalmic solution    TRUSOPT     Place 1 drop into the right eye 2 times daily        dorzolamide-timolol 2-0.5 % ophthalmic solution    COSOPT    1 Bottle    Place 1 drop into the right eye 2 times daily    Acute angle-closure glaucoma of right eye       FOSAMAX PO      Take 70 mg by mouth every 30 days        gabapentin 400 MG capsule    NEURONTIN     Take 400 mg by mouth 3 times daily        hydroxychloroquine 200 MG tablet    PLAQUENIL     Take 200 mg by mouth daily        inFLIXimab 100 MG injection    REMICADE     once        insulin detemir 100 UNIT/ML injection    LEVEMIR    3 mL    Inject 35 Units Subcutaneous At Bedtime    Type 2 diabetes mellitus with complication, with long-term current use of insulin (H)       latanoprost 0.005 % ophthalmic solution    XALATAN    1 Bottle    Place 1 drop into the right eye At Bedtime    Acute angle-closure glaucoma of right eye       LISINOPRIL PO      Take  10 mg by mouth daily        METOPROLOL TARTRATE PO      Take 25 mg by mouth daily        polyethylene glycol powder    MIRALAX/GLYCOLAX     Take 1 capful by mouth daily        prednisoLONE acetate 1 % ophthalmic susp    PRED FORTE    5 mL    Place 1 drop Into the left eye daily    Primary open angle glaucoma of both eyes, severe stage       predniSONE 5 MG tablet    DELTASONE     Take 5 mg by mouth daily        PRILOSEC PO      Take 10 mg by mouth 2 times daily (before meals)        REGLAN PO      Take 5 mg by mouth        SIMVASTATIN PO      Take 10 mg by mouth At Bedtime        sucralfate 1 GM/10ML suspension    CARAFATE     Take 1 g by mouth 4 times daily        TIMOLOL MALEATE PO      Take by mouth 2 times daily        * travoprost (AKILAH Free) 0.004 % ophthalmic solution    TRAVATAN Z     Place 1 drop into both eyes At Bedtime        * TRAVATAN Z 0.004 % ophthalmic solution   Generic drug:  travoprost (AKILAH Free)     3 Bottle    Place 1 drop Into the left eye At Bedtime    NVG (neovascular glaucoma), left, severe stage       ZOFRAN PO      Take 4 mg by mouth        * Notice:  This list has 4 medication(s) that are the same as other medications prescribed for you. Read the directions carefully, and ask your doctor or other care provider to review them with you.

## 2017-11-21 ENCOUNTER — TRANSFERRED RECORDS (OUTPATIENT)
Dept: HEALTH INFORMATION MANAGEMENT | Facility: CLINIC | Age: 57
End: 2017-11-21

## 2017-11-29 ENCOUNTER — OFFICE VISIT (OUTPATIENT)
Dept: OPHTHALMOLOGY | Facility: CLINIC | Age: 57
End: 2017-11-29

## 2017-11-29 ENCOUNTER — TELEPHONE (OUTPATIENT)
Dept: OPHTHALMOLOGY | Facility: CLINIC | Age: 57
End: 2017-11-29

## 2017-11-29 DIAGNOSIS — H40.53X3 NEOVASCULAR GLAUCOMA OF BOTH EYES, SEVERE STAGE: Primary | ICD-10-CM

## 2017-11-29 RX ORDER — PREDNISOLONE ACETATE 10 MG/ML
1 SUSPENSION/ DROPS OPHTHALMIC 4 TIMES DAILY
Qty: 1 BOTTLE | Refills: 3 | Status: SHIPPED | OUTPATIENT
Start: 2017-11-29 | End: 2018-08-28

## 2017-11-29 RX ORDER — DORZOLAMIDE HYDROCHLORIDE AND TIMOLOL MALEATE 20; 5 MG/ML; MG/ML
1 SOLUTION/ DROPS OPHTHALMIC 2 TIMES DAILY
Qty: 1 BOTTLE | Refills: 11 | Status: SHIPPED | OUTPATIENT
Start: 2017-11-29 | End: 2018-01-03

## 2017-11-29 ASSESSMENT — VISUAL ACUITY
OD_SC: 20/70+
OD_PH_SC: 20/40
METHOD: SNELLEN - LINEAR
OS_SC: NLP

## 2017-11-29 ASSESSMENT — EXTERNAL EXAM - LEFT EYE: OS_EXAM: NORMAL

## 2017-11-29 ASSESSMENT — TONOMETRY
OD_IOP_MMHG: 28
IOP_METHOD: TONOPEN

## 2017-11-29 ASSESSMENT — SLIT LAMP EXAM - LIDS: COMMENTS: NORMAL

## 2017-11-29 ASSESSMENT — EXTERNAL EXAM - RIGHT EYE: OD_EXAM: NORMAL

## 2017-11-29 NOTE — PATIENT INSTRUCTIONS
Patient will discontinue and hold onto the Travatan which is a teal top drop, Alphagan (Brimonidine) which is a purple top drop, and Diamox 250mg pills.  DO NOT USE THESE DROPS OR PILLS.    Patient will also continue Genteal gel 4-6x/day in the left eye.      No heavy lifting, bending, stooping, straining, rubbing the eye, or getting water in the eye.  Please wear protective eyewear over the eye at all times including glasses during the day and the protective shield at bedtime.  Patient will return to clinic in 2-3 weeks with repeat evaluation.  Patient will also follow up with retina clinic.      Use the following drops (RIGHT EYE ONLY):  Antibiotic --  Ciprofloxacin: Discontinue    Steroid -- Pred Forte/Prednisolone Acetate: 4x/day for 1 week then 3x/day until seen    Glaucoma -- Cosopt (Timolol/Dorzolamide) which is a blue top drop 2x/day (12 hours apart)      Please be sure to call if you have any decrease in vision, increase in pain, flashing lights, redness, or a lot of drainage.

## 2017-11-29 NOTE — MR AVS SNAPSHOT
After Visit Summary   11/29/2017    Connie Longoria    MRN: [id number]           Patient Information     Date Of Birth          1960        Visit Information        Provider Department      11/29/2017 10:00 AM Bethany William MD [facility]        Today's Diagnoses     Neovascular glaucoma of both eyes, severe stage    -  1      Care Instructions    Patient will discontinue and hold onto the Travatan which is a teal top drop, Alphagan (Brimonidine) which is a purple top drop, and Diamox 250mg pills.  DO NOT USE THESE DROPS OR PILLS.    Patient will also continue Genteal gel 4-6x/day in the left eye.      No heavy lifting, bending, stooping, straining, rubbing the eye, or getting water in the eye.  Please wear protective eyewear over the eye at all times including glasses during the day and the protective shield at bedtime.  Patient will return to clinic in 2-3 weeks with repeat evaluation.  Patient will also follow up with retina clinic.      Use the following drops (RIGHT EYE ONLY):  Antibiotic --  Ciprofloxacin: Discontinue    Steroid -- Pred Forte/Prednisolone Acetate: 4x/day for 1 week then 3x/day until seen    Glaucoma -- Cosopt (Timolol/Dorzolamide) which is a blue top drop 2x/day (12 hours apart)      Please be sure to call if you have any decrease in vision, increase in pain, flashing lights, redness, or a lot of drainage.              Follow-ups after your visit        Follow-up notes from your care team     Return  2-3 weeks with repeat evaluation and refraction for new glasses.      Your next 10 appointments already scheduled     Dec 13, 2017  9:00 AM CST   RETURN GLAUCOMA [facility] MD   [facility]    [facility]  [address] [address]   [phone]            Feb 07, 2018 10:30 AM CST   Return Visit with Bethany William MD    [facility]    [facility] [address] [facility]  [address]  [facility] [address] [phone]              Who to contact     Please call your clinic at [phone] to:    Ask questions about your health    Make or cancel appointments    Discuss your medicines    Learn about your test results    Speak to your doctor   If you have compliments or concerns about an experience at your clinic, or if you wish to file a complaint, please contact [facility] Patient Relations at [phone] or email us at [email or web address]         Additional Information About Your Visit        Edserv Softsystemshart Information     Nomis Solutionst is an electronic gateway that provides easy, online access to your medical records. With Correlsense, you can request a clinic appointment, read your test results, renew a prescription or communicate with your care team.     To sign up for Correlsense visit the website at [email or web address]   You will be asked to enter the access code listed below, as well as some personal information. Please follow the directions to create your username and password.     Your access code is: [email or web address]  Expires: 2018  2:41 PM     Your access code will  in 90 days. If you need help or a new code, please contact your [facility] or call [phone] for assistance.        Care EveryWhere ID     This is your Care EveryWhere ID. This could be used by other organizations to access your [place] medical records  [id number]         Blood Pressure from Last 3 Encounters:   17 103/61   16 128/62   11/03/15 135/78    Weight from Last 3 Encounters:   17 72.2 kg (159 lb 3.2 oz)   16 67.1 kg (148 lb)   11/03/15 68.5 kg (151 lb)              Today, you had the following     No orders found for display         Today's Medication Changes          These changes are  accurate as of: 11/29/17 10:45 AM.  If you have any questions, ask your nurse or doctor.               These medicines have changed or have updated prescriptions.        Dose/Directions    * dorzolamide-timolol 2-0.5 % ophthalmic solution   Commonly known as:  COSOPT   This may have changed:  Another medication with the same name was added. Make sure you understand how and when to take each.   Used for:  Acute angle-closure glaucoma of right eye   Changed by:  Tavares Villagomez MD        Dose:  1 drop   Place 1 drop into the right eye 2 times daily   Quantity:  1 Bottle   Refills:  0       * dorzolamide-timolol 2-0.5 % ophthalmic solution   Commonly known as:  COSOPT   This may have changed:  You were already taking a medication with the same name, and this prescription was added. Make sure you understand how and when to take each.   Used for:  Neovascular glaucoma of both eyes, severe stage   Changed by:  Bethany William MD        Dose:  1 drop   Place 1 drop into the right eye 2 times daily   Quantity:  1 Bottle   Refills:  11       * prednisoLONE acetate 1 % ophthalmic susp   Commonly known as:  PRED FORTE   This may have changed:  Another medication with the same name was added. Make sure you understand how and when to take each.   Used for:  Primary open angle glaucoma of both eyes, severe stage   Changed by:  Bethany William MD        Dose:  1 drop   Place 1 drop Into the left eye daily   Quantity:  5 mL   Refills:  0       * prednisoLONE acetate 1 % ophthalmic susp   Commonly known as:  PRED FORTE   This may have changed:  You were already taking a medication with the same name, and this prescription was added. Make sure you understand how and when to take each.   Used for:  Neovascular glaucoma of both eyes, severe stage   Changed by:  Bethany William MD        Dose:  1 drop   Place 1 drop into the right eye 4 times daily   Quantity:  1 Bottle   Refills:  3       * Notice:  This list has  4 medication(s) that are the same as other medications prescribed for you. Read the directions carefully, and ask your doctor or other care provider to review them with you.         Where to get your medicines      These medications were sent to [id number] - [place], [place] - [address] AT [facility] [address], [facility] [address]    Hours:  24-hours Phone:  [phone]     dorzolamide-timolol 2-0.5 % ophthalmic solution    prednisoLONE acetate 1 % ophthalmic susp                Primary Care Provider Office Phone # Fax #    Aliya See JEFFERY Carbajal [phone]       [facility] [id number] [place] [address]        Equal Access to Services     TEETEE RICCI AH: Hadii octavia solis hadasho Soomaali, waaxda luqadaha, qaybta kaalmada adeegyada, nikole díaz. So [facility] [phone].    ATENCIÓN: Si habla español, tiene a ron disposición servicios gratuitos de asistencia lingüística. Ll[facility] [phone].    We comply with applicable federal civil rights laws and [place] laws. We do not discriminate on the basis of race, color, national origin, age, disability, sex, sexual orientation, or gender identity.            Thank you!     Thank you for choosing [facility]  for your care. Our goal is always to provide you with excellent care. Hearing back from our patients is one way we can continue to improve our services. Please take a few minutes to complete the written survey that you may receive in the mail after your visit with us. Thank you!             Your Updated Medication List - Protect others around you: Learn how to safely use, store and throw away your medicines at www.disposemymeds.org.          This list is accurate as of: 11/29/17 10:45 AM.  Always use your most recent med list.                   Brand Name Dispense Instructions for use Diagnosis    acetaZOLAMIDE 250 MG tablet    DIAMOX    30  tablet    Take 1 tablet (250 mg) by mouth 2 times daily    Chronic primary angle-closure glaucoma of right eye, severe stage       aspirin 81 MG tablet      Take 81 mg by mouth daily        atropine 1 % ophthalmic solution     2 mL    Place 1 drop into both eyes 2 times daily    NVG (neovascular glaucoma), left, severe stage       * BRIMONIDINE TARTRATE OP      Apply to eye 2 times daily        * brimonidine 0.2 % ophthalmic solution    ALPHAGAN    1 Bottle    Place 1 drop into the right eye 2 times daily    Acute angle-closure glaucoma of right eye       brimonidine-timolol 0.2-0.5 % ophthalmic solution    COMBIGAN    10 mL    Place 1 drop Into the left eye 2 times daily    NVG (neovascular glaucoma), left, severe stage       Carboxymethylcell-Hypromellose 0.25-0.3 % Gel     30 mL    Place 1 Application Into the left eye 6 times daily    NVG (neovascular glaucoma), left, severe stage       dorzolamide 2 % ophthalmic solution    TRUSOPT     Place 1 drop into the right eye 2 times daily        * dorzolamide-timolol 2-0.5 % ophthalmic solution    COSOPT    1 Bottle    Place 1 drop into the right eye 2 times daily    Acute angle-closure glaucoma of right eye       * dorzolamide-timolol 2-0.5 % ophthalmic solution    COSOPT    1 Bottle    Place 1 drop into the right eye 2 times daily    Neovascular glaucoma of both eyes, severe stage       FOSAMAX PO      Take 70 mg by mouth every 30 days        gabapentin 400 MG capsule    NEURONTIN     Take 400 mg by mouth 3 times daily        hydroxychloroquine 200 MG tablet    PLAQUENIL     Take 200 mg by mouth daily        inFLIXimab 100 MG injection    REMICADE     once        insulin detemir 100 UNIT/ML injection    LEVEMIR    3 mL    Inject 35 Units Subcutaneous At Bedtime    Type 2 diabetes mellitus with complication, with long-term current use of insulin (H)       latanoprost 0.005 % ophthalmic solution    XALATAN    1 Bottle    Place 1 drop into the right eye At Bedtime     Acute angle-closure glaucoma of right eye       LISINOPRIL PO      Take 10 mg by mouth daily        METOPROLOL TARTRATE PO      Take 25 mg by mouth daily        polyethylene glycol powder    MIRALAX/GLYCOLAX     Take 1 capful by mouth daily        * prednisoLONE acetate 1 % ophthalmic susp    PRED FORTE    5 mL    Place 1 drop Into the left eye daily    Primary open angle glaucoma of both eyes, severe stage       * prednisoLONE acetate 1 % ophthalmic susp    PRED FORTE    1 Bottle    Place 1 drop into the right eye 4 times daily    Neovascular glaucoma of both eyes, severe stage       predniSONE 5 MG tablet    DELTASONE     Take 5 mg by mouth daily        PRILOSEC PO      Take 10 mg by mouth 2 times daily (before meals)        REGLAN PO      Take 5 mg by mouth        SIMVASTATIN PO      Take 10 mg by mouth At Bedtime        sucralfate 1 GM/10ML suspension    CARAFATE     Take 1 g by mouth 4 times daily        TIMOLOL MALEATE PO      Take by mouth 2 times daily        * travoprost (AKILAH Free) 0.004 % ophthalmic solution    TRAVATAN Z     Place 1 drop into both eyes At Bedtime        * TRAVATAN Z 0.004 % ophthalmic solution   Generic drug:  travoprost (AKILAH Free)     3 Bottle    Place 1 drop Into the left eye At Bedtime    NVG (neovascular glaucoma), left, severe stage       ZOFRAN PO      Take 4 mg by mouth        * Notice:  This list has 8 medication(s) that are the same as other medications prescribed for you. Read the directions carefully, and ask your doctor or other care provider to review them with you.

## 2017-11-29 NOTE — TELEPHONE ENCOUNTER
I spoke with Pat who indicated that insurance said it was too soon to have Cosopt refilled. They will communicate with insurance Co. To get it overwritten that it's okay to refill as Dr. William wants to make sure patient is not missing any dosing as her case is complex. I called pt to give her status of refill situation and also asked Pat if they can keep her informed from this point forward, and encouraged pt to call us if she's having difficulty with approval.  Monse Tenorio COT 2:35 PM 11/29/2017

## 2017-11-29 NOTE — PROGRESS NOTES
1)NVG OU -- s/p Tube OD (11/8/17), s/p TSCPC OS (4/25/16), s/p Tube OS x2 (8/15 and last in 11/15 by Dr. Harris), H/O tube revision x2 (last on 2/23/16), H/O tube retraction and conj retraction per old notes, etiology of vision loss -- K pachy: 618/--  Tmax: 60+ for both eyes HVF:   CDR: OD:0.4 and OS:No view HRT/OCT: OD:RNFL WNL   FHX of Glc: No  Gonio:Closed Intolerant to: Asthma/COPD: No, on po BB but smokes Steroid Use: Yes, po pred for RA Kidney Stones: Mild CRI per patient (PCP is Dr. Carbajal at [facility] in [place]) Sulfa Allergy: No IOP targets: OD:L-M20s and OS:Comfort --  -- pt adamantly does not want to remove the left eye if possible -- eye now non-painful  2)DM c PDR -- s/p PRP OD (11/8/17), s/p Avastin OD -- following with Mandie Fitzgerald -- received IV Avastin OD in hospital   3)RA on Plaquenil and Remicade -- following with Rheumatologist [facility]  4)PCIOL OU  5)NLP OS   6)H/O CAD s/p CABG per old notes  7)Ptosis OS -- pt may be interested in ptosis repair OS    Patient will discontinue and hold onto the Travatan which is a teal top drop, Alphagan (Brimonidine) which is a purple top drop, and Diamox 250mg pills.  DO NOT USE THESE DROPS OR PILLS.    Patient will also continue Genteal gel 4-6x/day in the left eye.      No heavy lifting, bending, stooping, straining, rubbing the eye, or getting water in the eye.  Please wear protective eyewear over the eye at all times including glasses during the day and the protective shield at bedtime.  Patient will return to clinic in 2-3 weeks with repeat evaluation and refraction for new glasses.  Patient will also follow up with retina clinic.      Use the following drops (RIGHT EYE ONLY):  Antibiotic --  Ciprofloxacin: Discontinue    Steroid -- Pred Forte/Prednisolone Acetate: 4x/day for 1 week then 3x/day until seen    Glaucoma -- Cosopt (Timolol/Dorzolamide) which is a blue top drop 2x/day (12 hours apart)      Please be sure to call if  you have any decrease in vision, increase in pain, flashing lights, redness, or a lot of drainage.

## 2017-11-29 NOTE — TELEPHONE ENCOUNTER
----- Message from Mayi Faustin sent at 11/29/2017  1:54 PM CST -----  Regarding: Pt just in clinic this morning, Dr. William prescribed cosopt, however, pt's ...  Contact: [phone]  Insurance has an issue with Rx.  Please call Pat at:  Ph# [phone], Fax# [phone] [address]  [facility] [address]     Please call pt at [phone] to let her know the status.    Thank you,  Zhang S    Please DO NOT send this message and/or reply back to sender.  Call Center Representatives DO NOT respond to messages.

## 2017-11-30 ENCOUNTER — TRANSFERRED RECORDS (OUTPATIENT)
Dept: HEALTH INFORMATION MANAGEMENT | Facility: CLINIC | Age: 57
End: 2017-11-30

## 2017-12-04 ENCOUNTER — TRANSFERRED RECORDS (OUTPATIENT)
Dept: HEALTH INFORMATION MANAGEMENT | Facility: CLINIC | Age: 57
End: 2017-12-04

## 2017-12-05 PROCEDURE — 99207 ZZC CDG-CODE CATEGORY CHANGED: CPT | Performed by: PHYSICIAN ASSISTANT

## 2017-12-13 ENCOUNTER — OFFICE VISIT (OUTPATIENT)
Dept: OPHTHALMOLOGY | Facility: CLINIC | Age: 57
End: 2017-12-13
Payer: MEDICARE

## 2017-12-13 DIAGNOSIS — H40.53X3 NEOVASCULAR GLAUCOMA OF BOTH EYES, SEVERE STAGE: Primary | ICD-10-CM

## 2017-12-13 RX ORDER — BRIMONIDINE TARTRATE 2 MG/ML
1 SOLUTION/ DROPS OPHTHALMIC 3 TIMES DAILY
Qty: 15 ML | Refills: 11 | Status: SHIPPED | OUTPATIENT
Start: 2017-12-13 | End: 2018-01-03

## 2017-12-13 RX ORDER — DORZOLAMIDE HYDROCHLORIDE AND TIMOLOL MALEATE 20; 5 MG/ML; MG/ML
1 SOLUTION/ DROPS OPHTHALMIC 2 TIMES DAILY
Qty: 1 BOTTLE | Refills: 11 | Status: SHIPPED | OUTPATIENT
Start: 2017-12-13 | End: 2018-01-03

## 2017-12-13 RX ORDER — PREDNISOLONE ACETATE 10 MG/ML
1 SUSPENSION/ DROPS OPHTHALMIC 4 TIMES DAILY
Qty: 1 BOTTLE | Refills: 3 | Status: SHIPPED | OUTPATIENT
Start: 2017-12-13 | End: 2018-01-03

## 2017-12-13 ASSESSMENT — VISUAL ACUITY
METHOD: SNELLEN - LINEAR
OD_SC: 20/30
OS_SC: NLP
OD_SC+: -1/+2

## 2017-12-13 ASSESSMENT — REFRACTION_MANIFEST
OD_ADD: +2.75
OD_SPHERE: -1.00
OD_CYLINDER: +0.50
OD_AXIS: 155

## 2017-12-13 ASSESSMENT — CONF VISUAL FIELD
OD_NORMAL: 1
OS_INFERIOR_TEMPORAL_RESTRICTION: 1
OS_INFERIOR_NASAL_RESTRICTION: 1
OS_SUPERIOR_NASAL_RESTRICTION: 1
OS_SUPERIOR_TEMPORAL_RESTRICTION: 1

## 2017-12-13 ASSESSMENT — TONOMETRY
OD_IOP_MMHG: 36
OS_IOP_MMHG: 14
IOP_METHOD: APPLANATION

## 2017-12-13 ASSESSMENT — CUP TO DISC RATIO: OD_RATIO: 0.4

## 2017-12-13 ASSESSMENT — EXTERNAL EXAM - LEFT EYE: OS_EXAM: NORMAL

## 2017-12-13 ASSESSMENT — EXTERNAL EXAM - RIGHT EYE: OD_EXAM: NORMAL

## 2017-12-13 ASSESSMENT — SLIT LAMP EXAM - LIDS: COMMENTS: NORMAL

## 2017-12-13 NOTE — PROGRESS NOTES
1)NVG OU -- s/p Tube OD (11/8/17), s/p TSCPC OS (4/25/16), s/p Tube OS x2 (8/15 and last in 11/15 by Dr. Harris), H/O tube revision x2 (last on 2/23/16), H/O tube retraction and conj retraction per old notes, etiology of vision loss -- K pachy: 618/--  Tmax: 60+ for both eyes HVF:   CDR: OD:0.4 and OS:No view HRT/OCT: OD:RNFL WNL   FHX of Glc: No  Gonio:Closed Intolerant to: Asthma/COPD: No, on po BB but smokes Steroid Use: Yes, po pred for RA Kidney Stones: Mild CRI per patient (PCP is Dr. Carbajal at [facility] in [place]) Sulfa Allergy: No IOP targets: OD:L-M20s and OS:Comfort --  -- pt adamantly does not want to remove the left eye if possible -- eye now non-painful -- IOP above target 2/2 steroid repsonse vs HTNsve phase  2)DM c PDR -- s/p PRP OD (11/8/17), s/p Avastin OD -- following with Mandie Fitzgerald -- received IV Avastin OD in hospital   3)RA on Plaquenil and Remicade -- following with [facility]umat[facility]  4)PCIOL OU  5)NLP OS   6)H/O CAD s/p CABG per old notes  7)Ptosis OS -- pt may be interested in ptosis repair OS    Patient will discontinue and hold onto the Travatan which is a teal top drop and Diamox 250mg pills.  DO NOT USE THESE DROPS OR PILLS.    Patient will also continue Genteal gel 4-6x/day in the left eye.      No heavy lifting, bending, stooping, straining, rubbing the eye, or getting water in the eye.  Please wear protective eyewear over the eye at all times including glasses during the day and the protective shield at bedtime.  Patient will return to clinic in 2-3 weeks with repeat evaluation and IOP check.  Patient will also follow up with retina clinic.      Use the following drops (RIGHT EYE ONLY):  Antibiotic --  Ciprofloxacin: Discontinue    Steroid -- Pred Forte/Prednisolone Acetate: 3x/day for 2 week then 2x/day until seen    Glaucoma --  Cosopt (Timolol/Dorzolamide) which is a blue top drop 2x/day (12 hours apart) and     Alphagan (Brimonidine) which is a  purple top drop 2x/day (12 hours apart)         Please be sure to call if you have any decrease in vision, increase in pain, flashing lights, redness, or a lot of drainage.

## 2017-12-13 NOTE — PATIENT INSTRUCTIONS
Patient will discontinue and hold onto the Travatan which is a teal top drop and Diamox 250mg pills.  DO NOT USE THESE DROPS OR PILLS.    Patient will also continue Genteal gel 4-6x/day in the left eye.      No heavy lifting, bending, stooping, straining, rubbing the eye, or getting water in the eye.  Please wear protective eyewear over the eye at all times including glasses during the day and the protective shield at bedtime.  Patient will return to clinic in 2-3 weeks with repeat evaluation and IOP check.  Patient will also follow up with retina clinic.      Use the following drops (RIGHT EYE ONLY):  Antibiotic --  Ciprofloxacin: Discontinue    Steroid --   Pred Forte/Prednisolone Acetate: 3x/day for 2 week then 2x/day until seen    Glaucoma --  Cosopt (Timolol/Dorzolamide) which is a blue top drop 2x/day (12 hours apart) and      Alphagan (Brimonidine) which is a purple top drop 2x/day (12 hours apart)         Please be sure to call if you have any decrease in vision, increase in pain, flashing lights, redness, or a lot of drainage.

## 2017-12-13 NOTE — MR AVS SNAPSHOT
After Visit Summary   12/13/2017    Connie Longoria    MRN: [id number]           Patient Information     Date Of Birth          1960        Visit Information        Provider Department      12/13/2017 9:00 AM Bethany William MD [facility]        Today's Diagnoses     Neovascular glaucoma of both eyes, severe stage    -  1      Care Instructions    Patient will discontinue and hold onto the Travatan which is a teal top drop and Diamox 250mg pills.  DO NOT USE THESE DROPS OR PILLS.    Patient will also continue Genteal gel 4-6x/day in the left eye.      No heavy lifting, bending, stooping, straining, rubbing the eye, or getting water in the eye.  Please wear protective eyewear over the eye at all times including glasses during the day and the protective shield at bedtime.  Patient will return to clinic in 2-3 weeks with repeat evaluation and IOP check.  Patient will also follow up with retina clinic.      Use the following drops (RIGHT EYE ONLY):  Antibiotic --  Ciprofloxacin: Discontinue    Steroid --   Pred Forte/Prednisolone Acetate: 3x/day for 2 week then 2x/day until seen    Glaucoma --  Cosopt (Timolol/Dorzolamide) which is a blue top drop 2x/day (12 hours apart) and      Alphagan (Brimonidine) which is a purple top drop 2x/day (12 hours apart)         Please be sure to call if you have any decrease in vision, increase in pain, flashing lights, redness, or a lot of drainage.           Follow-ups after your visit        Your next 10 appointments already scheduled     Feb 09, 2018  9:15 AM CST   RETURN GLAUCOMA with Bethany William MD   [facility] ([facility])    [facility]  [address] [address] [phone]              Who to contact     Please call your clinic at [phone] to:    Ask questions about your health    Make or cancel appointments    Discuss your  medicines    Learn about your test results    Speak to your doctor   If you have compliments or concerns about an experience at your clinic, or if you wish to file a complaint, please contact [facility] Patient Relations at [phone] or email us at [email or web address]         Additional Information About Your Visit        [employer] Information     [employer] is an electronic gateway that provides easy, online access to your medical records. With [employer], you can request a clinic appointment, read your test results, renew a prescription or communicate with your care team.     To sign up for [employer] visit the website at [email or web address]   You will be asked to enter the access code listed below, as well as some personal information. Please follow the directions to create your username and password.     Your access code is: [email or web address]  Expires: 2018  2:41 PM     Your access code will  in 90 days. If you need help or a new code, please contact your [facility] or call [phone] for assistance.        Care EveryWhere ID     This is your Care EveryWhere ID. This could be used by other organizations to access your [facility] medical records  [id number]         Blood Pressure from Last 3 Encounters:   17 103/61   16 128/62   11/03/15 135/78    Weight from Last 3 Encounters:   17 72.2 kg (159 lb 3.2 oz)   16 67.1 kg (148 lb)   11/03/15 68.5 kg (151 lb)              Today, you had the following     No orders found for display         Today's Medication Changes          These changes are accurate as of: 17  9:43 AM.  If you have any questions, ask your nurse or doctor.               Stop taking these medicines if you haven't already. Please contact your care team if you have questions.     acetaZOLAMIDE 250 MG tablet   Commonly known as:  DIAMOX   Stopped by:  Bethany William MD                     Primary Care Provider Office Phone # Fax #    Aliya See JEFFERY Carbajal [phone]       [address] [address]        Equal Access to Services     TEETEE RICCI : Hadii aad ku hadclintono Sodwainali, waaxda luqadaha, qaybta kaalmada adeegyada, waxmary idiin vickin kirt mccormack bc díaz. So [facility] [phone].    ATENCIÓN: Si habla español, tiene a ron disposición servicios gratuitos de asistencia lingüística. Llame al [phone].    We comply with applicable federal civil rights laws and [place] laws. We do not discriminate on the basis of race, color, national origin, age, disability, sex, sexual orientation, or gender identity.            Thank you!     Thank you for choosing [facility]  for your care. Our goal is always to provide you with excellent care. Hearing back from our patients is one way we can continue to improve our services. Please take a few minutes to complete the written survey that you may receive in the mail after your visit with us. Thank you!             Your Updated Medication List - Protect others around you: Learn how to safely use, store and throw away your medicines at www.disposemymeds.org.          This list is accurate as of: 12/13/17  9:43 AM.  Always use your most recent med list.                   Brand Name Dispense Instructions for use Diagnosis    aspirin 81 MG tablet      Take 81 mg by mouth daily        atropine 1 % ophthalmic solution     2 mL    Place 1 drop into both eyes 2 times daily    NVG (neovascular glaucoma), left, severe stage       * BRIMONIDINE TARTRATE OP      Apply to eye 2 times daily        * brimonidine 0.2 % ophthalmic solution    ALPHAGAN    1 Bottle    Place 1 drop into the right eye 2 times daily    Acute angle-closure glaucoma of right eye       brimonidine-timolol 0.2-0.5 % ophthalmic solution    COMBIGAN    10 mL    Place 1 drop Into the left eye 2 times daily    NVG (neovascular  glaucoma), left, severe stage       Carboxymethylcell-Hypromellose 0.25-0.3 % Gel     30 mL    Place 1 Application Into the left eye 6 times daily    NVG (neovascular glaucoma), left, severe stage       dorzolamide 2 % ophthalmic solution    TRUSOPT     Place 1 drop into the right eye 2 times daily        * dorzolamide-timolol 2-0.5 % ophthalmic solution    COSOPT    1 Bottle    Place 1 drop into the right eye 2 times daily    Acute angle-closure glaucoma of right eye       * dorzolamide-timolol 2-0.5 % ophthalmic solution    COSOPT    1 Bottle    Place 1 drop into the right eye 2 times daily    Neovascular glaucoma of both eyes, severe stage       FOSAMAX PO      Take 70 mg by mouth every 30 days        gabapentin 400 MG capsule    NEURONTIN     Take 400 mg by mouth 3 times daily        hydroxychloroquine 200 MG tablet    PLAQUENIL     Take 200 mg by mouth daily        inFLIXimab 100 MG injection    REMICADE     once        insulin detemir 100 UNIT/ML injection    LEVEMIR    3 mL    Inject 35 Units Subcutaneous At Bedtime    Type 2 diabetes mellitus with complication, with long-term current use of insulin (H)       latanoprost 0.005 % ophthalmic solution    XALATAN    1 Bottle    Place 1 drop into the right eye At Bedtime    Acute angle-closure glaucoma of right eye       LISINOPRIL PO      Take 10 mg by mouth daily        METOPROLOL TARTRATE PO      Take 25 mg by mouth daily        polyethylene glycol powder    MIRALAX/GLYCOLAX     Take 1 capful by mouth daily        * prednisoLONE acetate 1 % ophthalmic susp    PRED FORTE    5 mL    Place 1 drop Into the left eye daily    Primary open angle glaucoma of both eyes, severe stage       * prednisoLONE acetate 1 % ophthalmic susp    PRED FORTE    1 Bottle    Place 1 drop into the right eye 4 times daily    Neovascular glaucoma of both eyes, severe stage       predniSONE 5 MG tablet    DELTASONE     Take 5 mg by mouth daily        PRILOSEC PO      Take 10 mg by mouth 2  times daily (before meals)        REGLAN PO      Take 5 mg by mouth        SIMVASTATIN PO      Take 10 mg by mouth At Bedtime        sucralfate 1 GM/10ML suspension    CARAFATE     Take 1 g by mouth 4 times daily        TIMOLOL MALEATE PO      Take by mouth 2 times daily        * travoprost (AKILAH Free) 0.004 % ophthalmic solution    TRAVATAN Z     Place 1 drop into both eyes At Bedtime        * TRAVATAN Z 0.004 % ophthalmic solution   Generic drug:  travoprost (AKILAH Free)     3 Bottle    Place 1 drop Into the left eye At Bedtime    NVG (neovascular glaucoma), left, severe stage       ZOFRAN PO      Take 4 mg by mouth        * Notice:  This list has 8 medication(s) that are the same as other medications prescribed for you. Read the directions carefully, and ask your doctor or other care provider to review them with you.

## 2017-12-13 NOTE — NURSING NOTE
Chief Complaints and History of Present Illnesses   Patient presents with     Glaucoma Follow Up     HPI    Affected eye(s):  Both   Symptoms:     No decreased vision   No floaters   No flashes   No foreign body sensation      Duration:  2 weeks   Frequency:  Constant       Do you have eye pain now?:  No      Comments:  MR today  Cosopt BID RE  Patient will also continue Genteal gel 4-6x/day in the left eye.     Monse HUNT 9:21 AM 12/13/2017

## 2018-01-03 ENCOUNTER — OFFICE VISIT (OUTPATIENT)
Dept: OPHTHALMOLOGY | Facility: CLINIC | Age: 58
End: 2018-01-03
Payer: MEDICARE

## 2018-01-03 DIAGNOSIS — H40.53X3 NEOVASCULAR GLAUCOMA OF BOTH EYES, SEVERE STAGE: Primary | ICD-10-CM

## 2018-01-03 DIAGNOSIS — Z96.1 PSEUDOPHAKIA OF BOTH EYES: ICD-10-CM

## 2018-01-03 RX ORDER — DORZOLAMIDE HYDROCHLORIDE AND TIMOLOL MALEATE 20; 5 MG/ML; MG/ML
1 SOLUTION/ DROPS OPHTHALMIC 2 TIMES DAILY
Qty: 1 BOTTLE | Refills: 11 | Status: SHIPPED | OUTPATIENT
Start: 2018-01-03 | End: 2018-02-13

## 2018-01-03 RX ORDER — BRIMONIDINE TARTRATE 2 MG/ML
1 SOLUTION/ DROPS OPHTHALMIC 3 TIMES DAILY
Qty: 15 ML | Refills: 11 | Status: SHIPPED | OUTPATIENT
Start: 2018-01-03 | End: 2018-08-28

## 2018-01-03 ASSESSMENT — CONF VISUAL FIELD
OS_INFERIOR_TEMPORAL_RESTRICTION: 1
OS_SUPERIOR_TEMPORAL_RESTRICTION: 1
OS_INFERIOR_NASAL_RESTRICTION: 1
OD_NORMAL: 1
OS_SUPERIOR_NASAL_RESTRICTION: 1

## 2018-01-03 ASSESSMENT — EXTERNAL EXAM - LEFT EYE: OS_EXAM: NORMAL

## 2018-01-03 ASSESSMENT — SLIT LAMP EXAM - LIDS: COMMENTS: NORMAL

## 2018-01-03 ASSESSMENT — TONOMETRY
OS_IOP_MMHG: 14
IOP_METHOD: APPLANATION
OD_IOP_MMHG: 30

## 2018-01-03 ASSESSMENT — VISUAL ACUITY
OD_SC: 20/30-2
OS_SC: NLP
METHOD: SNELLEN - LINEAR

## 2018-01-03 ASSESSMENT — EXTERNAL EXAM - RIGHT EYE: OD_EXAM: NORMAL

## 2018-01-03 ASSESSMENT — CUP TO DISC RATIO: OD_RATIO: 0.3

## 2018-01-03 NOTE — PATIENT INSTRUCTIONS
Patient will discontinue and hold onto the Diamox 250mg pills and Travatan which is a teal top drop.  DO NOT USE THESE DROPS OR PILLS.    Patient will also continue Genteal gel 4-6x/day in the left eye.      No heavy lifting, bending, stooping, straining, rubbing the eye, or getting water in the eye.  Please wear protective eyewear over the eye at all times including glasses during the day and the protective shield at bedtime.  Patient will return to clinic in 3-4 weeks with repeat evaluation and IOP check.  Patient will also follow up with retina clinic.      Use the following drops (RIGHT EYE ONLY):    Steroid -- Pred Forte/Prednisolone Acetate: 1x/day for 2 weeks then stop    Glaucoma --  Cosopt (Timolol/Dorzolamide) which is a blue top drop 2x/day (12 hours apart) and     Alphagan (Brimonidine) which is a purple top drop 2x/day (12 hours apart)      Please be sure to call if you have any decrease in vision, increase in pain, flashing lights, redness, or a lot of drainage.

## 2018-01-03 NOTE — PROGRESS NOTES
1)NVG OU -- s/p Tube OD (11/8/17), s/p TSCPC OS (4/25/16), s/p Tube OS x2 (8/15 and last in 11/15 by Dr. Harris), H/O tube revision x2 (last on 2/23/16), H/O tube retraction and conj retraction per old notes, etiology of vision loss -- K pachy: 618/--  Tmax: 60+ for both eyes HVF:   CDR: OD:0.4 and OS:No view HRT/OCT: OD:RNFL WNL   FHX of Glc: No  Gonio:Closed Intolerant to: Asthma/COPD: No, on po BB but smokes Steroid Use: Yes, po pred for RA Kidney Stones: Mild CRI per patient (PCP is Dr. Carbajal at [facility] in [place]) Sulfa Allergy: No IOP targets: OD:L-M20s and OS:Comfort --  -- pt adamantly does not want to remove the left eye if possible -- eye now non-painful -- IOP above target 2/2 steroid repsonse -- considering adding PGA if IOP above target at next visit  2)DM c PDR -- s/p PRP OD (11/8/17), s/p Avastin OD -- following with Mandie Fitzgerald -- received IV Avastin OD in hospital   3)RA on Plaquenil and Remicade -- following with Rheumat[facility]  4)PCIOL OU  5)NLP OS   6)H/O CAD s/p CABG per old notes  7)Ptosis OS -- pt may be interested in ptosis repair OS    Patient will discontinue and hold onto the Diamox 250mg pills and Travatan which is a teal top drop.  DO NOT USE THESE DROPS OR PILLS.    Patient will also continue Genteal gel 4-6x/day in the left eye.      No heavy lifting, bending, stooping, straining, rubbing the eye, or getting water in the eye.  Please wear protective eyewear over the eye at all times including glasses during the day and the protective shield at bedtime.  Patient will return to clinic in 3-4 weeks with repeat evaluation and IOP check.  Patient will also follow up with retina clinic.      Use the following drops (RIGHT EYE ONLY):    Steroid -- Pred Forte/Prednisolone Acetate: 1x/day for 2 weeks then stop    Glaucoma --  Cosopt (Timolol/Dorzolamide) which is a blue top drop 2x/day (12 hours apart) and     Alphagan (Brimonidine) which is a purple top  drop 2x/day (12 hours apart)      Please be sure to call if you have any decrease in vision, increase in pain, flashing lights, redness, or a lot of drainage.

## 2018-01-03 NOTE — NURSING NOTE
Chief Complaints and History of Present Illnesses   Patient presents with     Post Op (Ophthalmology) Right Eye     HPI    Affected eye(s):  Right   Symptoms:     No decreased vision      Duration:  1 month   Frequency:  Constant       Do you have eye pain now?:  No      Comments:  s/p Tube OD (11/8/17)    Using lubricating drops in LE as needed, doesn't always do 4-6x/day if eye is feeling good    RE:  Dorz-Rubén twice daily 12 hours apart - last 8:00am  Prednisolone twice daily every 12 hours, last @ 8:05am  Brimonidine twice daily every 12 hours, last @ 8:10am    MARIA ESTHER Figueroa 9:24 AM 01/03/2018

## 2018-01-03 NOTE — MR AVS SNAPSHOT
After Visit Summary   1/3/2018    Connie Longoria    MRN: [id number]           Patient Information     Date Of Birth          1960        Visit Information        Provider Department      1/3/2018 9:00 AM Behtany William MD [facility]        Today's Diagnoses     Neovascular glaucoma of both eyes, severe stage    -  1    Pseudophakia of both eyes          Care Instructions    Patient will discontinue and hold onto the Diamox 250mg pills and Travatan which is a teal top drop.  DO NOT USE THESE DROPS OR PILLS.    Patient will also continue Genteal gel 4-6x/day in the left eye.      No heavy lifting, bending, stooping, straining, rubbing the eye, or getting water in the eye.  Please wear protective eyewear over the eye at all times including glasses during the day and the protective shield at bedtime.  Patient will return to clinic in 3-4 weeks with repeat evaluation and IOP check.  Patient will also follow up with retina clinic.      Use the following drops (RIGHT EYE ONLY):    Steroid -- Pred Forte/Prednisolone Acetate: 1x/day for 2 weeks then stop    Glaucoma --  Cosopt (Timolol/Dorzolamide) which is a blue top drop 2x/day (12 hours apart) and     Alphagan (Brimonidine) which is a purple top drop 2x/day (12 hours apart)      Please be sure to call if you have any decrease in vision, increase in pain, flashing lights, redness, or a lot of drainage.            Follow-ups after your visit        Your next 10 appointments already scheduled     Feb 09, 2018  9:15 AM CST   RETURN GLAUCOMA with Bethany William MD   [facility]    [facility]  [address] [address] [phone]              Who to contact     Please call your clinic at [phone] to:    Ask questions about your health    Make or cancel appointments    Discuss your medicines    Learn about your test  results    Speak to your doctor   If you have compliments or concerns about an experience at your clinic, or if you wish to file a complaint, please contact [facility] Patient Relations at [phone] or email us at [email or web address]         Additional Information About Your Visit        Symformhart Information     [employer] Guid[employer] is an electronic gateway that provides easy, online access to your medical records. With Vamo, you can request a clinic appointment, read your test results, renew a prescription or communicate with your care team.     To sign up for Vamo visit the website at [email or web address]   You will be asked to enter the access code listed below, as well as some personal information. Please follow the directions to create your username and password.     Your access code is: [email or web address]  Expires: 2018  2:41 PM     Your access code will  in 90 days. If you need help or a new code, please contact your [facility] or call [phone] for assistance.        Care EveryWhere ID     This is your Care EveryWhere ID. This could be used by other organizations to access your [place] medical records  [id number]         Blood Pressure from Last 3 Encounters:   17 103/61   16 128/62   11/03/15 135/78    Weight from Last 3 Encounters:   17 72.2 kg (159 lb 3.2 oz)   16 67.1 kg (148 lb)   11/03/15 68.5 kg (151 lb)              Today, you had the following     No orders found for display         Today's Medication Changes          These changes are accurate as of: 1/3/18  9:49 AM.  If you have any questions, ask your nurse or doctor.               These medicines have changed or have updated prescriptions.        Dose/Directions    brimonidine 0.2 % ophthalmic solution   Commonly known as:  ALPHAGAN   This may have changed:  Another medication with the same name was removed. Continue taking this medication,  and follow the directions you see here.   Used for:  Acute angle-closure glaucoma of right eye   Changed by:  Tavares Villagomez MD        Dose:  1 drop   Place 1 drop into the right eye 2 times daily   Quantity:  1 Bottle   Refills:  0       dorzolamide-timolol 2-0.5 % ophthalmic solution   Commonly known as:  COSOPT   This may have changed:  Another medication with the same name was removed. Continue taking this medication, and follow the directions you see here.   Used for:  Acute angle-closure glaucoma of right eye   Changed by:  Tavares Villagomez MD        Dose:  1 drop   Place 1 drop into the right eye 2 times daily   Quantity:  1 Bottle   Refills:  0       prednisoLONE acetate 1 % ophthalmic susp   Commonly known as:  PRED FORTE   This may have changed:  Another medication with the same name was removed. Continue taking this medication, and follow the directions you see here.   Used for:  Neovascular glaucoma of both eyes, severe stage   Changed by:  Bethany William MD        Dose:  1 drop   Place 1 drop into the right eye 4 times daily   Quantity:  1 Bottle   Refills:  3         Stop taking these medicines if you haven't already. Please contact your care team if you have questions.     atropine 1 % ophthalmic solution   Stopped by:  Bethany William MD           brimonidine-timolol 0.2-0.5 % ophthalmic solution   Commonly known as:  COMBIGAN   Stopped by:  Bethany William MD           dorzolamide 2 % ophthalmic solution   Commonly known as:  TRUSOPT   Stopped by:  Bethany William MD           latanoprost 0.005 % ophthalmic solution   Commonly known as:  XALATAN   Stopped by:  Bethany William MD           predniSONE 5 MG tablet   Commonly known as:  DELTASONE   Stopped by:  Bethany William MD           sucralfate 1 GM/10ML suspension   Commonly known as:  CARAFATE   Stopped by:  Bethany William MD           TIMOLOL MALEATE PO   Stopped by:  Bethany William MD            TRAVATAN Z 0.004 % ophthalmic solution   Generic drug:  travoprost (AKILAH Free)   Stopped by:  Bethany William MD           travoprost (AKILAH Free) 0.004 % ophthalmic solution   Commonly known as:  TRAVATAN Z   Stopped by:  Bethany William MD                    Primary Care Provider Office Phone # Fax #    Aliya See JEFFERY Carbajal [phone]       [address] [address]        Equal Access to Services     [facility] RADHAMES : Hadii aad ku hadasho Soomaali, waaxda luqadaha, qaybta kaalmada adeegyada, waxay idiin hayaan adeeg kharash lasea . So [facility] [phone].    ATENCIÓN: Si habla español, tiene a ron disposición servicios gratuitos de asistencia lingüística. [facility] [phone].    We comply with applicable federal civil rights laws and [place] laws. We do not discriminate on the basis of race, color, national origin, age, disability, sex, sexual orientation, or gender identity.            Thank you!     Thank you for choosing [facility]  for your care. Our goal is always to provide you with excellent care. Hearing back from our patients is one way we can continue to improve our services. Please take a few minutes to complete the written survey that you may receive in the mail after your visit with us. Thank you!             Your Updated Medication List - Protect others around you: Learn how to safely use, store and throw away your medicines at www.disposemymeds.org.          This list is accurate as of: 1/3/18  9:49 AM.  Always use your most recent med list.                   Brand Name Dispense Instructions for use Diagnosis    aspirin 81 MG tablet      Take 81 mg by mouth daily        brimonidine 0.2 % ophthalmic solution    ALPHAGAN    1 Bottle    Place 1 drop into the right eye 2 times daily    Acute angle-closure glaucoma of right eye       Carboxymethylcell-Hypromellose 0.25-0.3 % Gel     30 mL    Place 1 Application  Into the left eye 6 times daily    NVG (neovascular glaucoma), left, severe stage       dorzolamide-timolol 2-0.5 % ophthalmic solution    COSOPT    1 Bottle    Place 1 drop into the right eye 2 times daily    Acute angle-closure glaucoma of right eye       FOSAMAX PO      Take 70 mg by mouth every 30 days        gabapentin 400 MG capsule    NEURONTIN     Take 400 mg by mouth 3 times daily        hydroxychloroquine 200 MG tablet    PLAQUENIL     Take 200 mg by mouth daily        inFLIXimab 100 MG injection    REMICADE     once        insulin detemir 100 UNIT/ML injection    LEVEMIR    3 mL    Inject 35 Units Subcutaneous At Bedtime    Type 2 diabetes mellitus with complication, with long-term current use of insulin (H)       LISINOPRIL PO      Take 10 mg by mouth daily        METOPROLOL TARTRATE PO      Take 25 mg by mouth daily        polyethylene glycol powder    MIRALAX/GLYCOLAX     Take 1 capful by mouth daily        prednisoLONE acetate 1 % ophthalmic susp    PRED FORTE    1 Bottle    Place 1 drop into the right eye 4 times daily    Neovascular glaucoma of both eyes, severe stage       PRILOSEC PO      Take 10 mg by mouth 2 times daily (before meals)        REGLAN PO      Take 5 mg by mouth        SIMVASTATIN PO      Take 10 mg by mouth At Bedtime        ZOFRAN PO      Take 4 mg by mouth

## 2018-01-09 ENCOUNTER — TRANSFERRED RECORDS (OUTPATIENT)
Dept: HEALTH INFORMATION MANAGEMENT | Facility: CLINIC | Age: 58
End: 2018-01-09

## 2018-01-31 ENCOUNTER — TRANSFERRED RECORDS (OUTPATIENT)
Dept: HEALTH INFORMATION MANAGEMENT | Facility: CLINIC | Age: 58
End: 2018-01-31

## 2018-02-09 ENCOUNTER — OFFICE VISIT (OUTPATIENT)
Dept: OPHTHALMOLOGY | Facility: CLINIC | Age: 58
End: 2018-02-09
Payer: MEDICARE

## 2018-02-09 DIAGNOSIS — Z96.1 PSEUDOPHAKIA OF BOTH EYES: ICD-10-CM

## 2018-02-09 DIAGNOSIS — H40.53X3 NEOVASCULAR GLAUCOMA OF BOTH EYES, SEVERE STAGE: Primary | ICD-10-CM

## 2018-02-09 RX ORDER — TRAVOPROST 0.04 MG/ML
1 SOLUTION/ DROPS OPHTHALMIC AT BEDTIME
Qty: 1 BOTTLE | Refills: 11 | Status: SHIPPED | OUTPATIENT
Start: 2018-02-09 | End: 2018-09-27

## 2018-02-09 RX ORDER — DORZOLAMIDE HYDROCHLORIDE AND TIMOLOL MALEATE 20; 5 MG/ML; MG/ML
1 SOLUTION/ DROPS OPHTHALMIC 2 TIMES DAILY
Qty: 1 BOTTLE | Refills: 11 | Status: SHIPPED | OUTPATIENT
Start: 2018-02-09 | End: 2018-08-28

## 2018-02-09 ASSESSMENT — CONF VISUAL FIELD
OS_SUPERIOR_TEMPORAL_RESTRICTION: 1
OD_NORMAL: 1
METHOD: COUNTING FINGERS
OS_INFERIOR_NASAL_RESTRICTION: 1
OS_INFERIOR_TEMPORAL_RESTRICTION: 1
OS_SUPERIOR_NASAL_RESTRICTION: 1

## 2018-02-09 ASSESSMENT — SLIT LAMP EXAM - LIDS: COMMENTS: NORMAL

## 2018-02-09 ASSESSMENT — EXTERNAL EXAM - LEFT EYE: OS_EXAM: NORMAL

## 2018-02-09 ASSESSMENT — VISUAL ACUITY
OD_SC+: +2
METHOD: SNELLEN - LINEAR
OS_SC: NLP
OD_SC: 20/40

## 2018-02-09 ASSESSMENT — TONOMETRY
IOP_METHOD: APPLANATION
OS_IOP_MMHG: 18
OD_IOP_MMHG: 34

## 2018-02-09 ASSESSMENT — EXTERNAL EXAM - RIGHT EYE: OD_EXAM: NORMAL

## 2018-02-09 ASSESSMENT — CUP TO DISC RATIO: OD_RATIO: 0.3

## 2018-02-09 NOTE — NURSING NOTE
Chief Complaints and History of Present Illnesses   Patient presents with     Post Op (Ophthalmology) Left Eye     3 month post op RE / shunt placement     HPI    Affected eye(s):  Right   Symptoms:     No decreased vision   No floaters   No flashes   Tearing      Duration:  1 month   Frequency:  Constant       Do you have eye pain now?:  No      Comments:  Had swelling in RE after last injection 1/31 by Dr. Albarran / states much better now.  Cosopt BID to RE / LD @ 7 am today  Brimonidine BID to RE / LD @ about 7 am today    Monse HUNT 9:31 AM 02/09/2018

## 2018-02-09 NOTE — MR AVS SNAPSHOT
After Visit Summary   2/9/2018    Connie Longoria    MRN: [id number]           Patient Information     Date Of Birth          1960        Visit Information        Provider Department      2/9/2018 9:15 AM [facility] MD [facility]        Today's Diagnoses     Neovascular glaucoma of both eyes, severe stage    -  1    Pseudophakia of both eyes          Care Instructions    Patient will discontinue and hold onto the Diamox 250mg pills.  DO NOT USE THESE PILLS.    Patient will also continue Genteal gel 4-6x/day in the left eye.     Patient will continue on Cosopt (Timolol/Dorzolamide) which is a blue top drop 2x/day (12 hours apart) in the RIGHT EYE ONLY, Alphagan (Brimonidine) which is a purple top drop 3x/day (8 hours apart) in the RIGHT EYE ONLY, and start Travatan which is a teal top drop at bedtime in the RIGHT EYE ONLY.      Patient will return to clinic in 3-4 weeks with repeat evaluation and IOP check.  Patient will also follow up with retina clinic.      Please be sure to call if you have any decrease in vision, increase in pain, flashing lights, redness, or a lot of drainage.           Follow-ups after your visit        Follow-up notes from your care team     Return  3-4 weeks with repeat evaluation and IOP check. .      Who to contact     Please call your clinic at [phone] to:    Ask questions about your health    Make or cancel appointments    Discuss your medicines    Learn about your test results    Speak to your doctor            Additional Information About Your Visit        MyChart Information     Ready is an electronic gateway that provides easy, online access to your medical records. With Ready, you can request a clinic appointment, read your test results, renew a prescription or communicate with your care team.     To sign up for Ready visit the website at [email or web address]   You will be asked to enter the access code  listed below, as well as some personal information. Please follow the directions to create your username and password.     Your access code is: [email or web address]  Expires: 5/10/2018 10:57 AM     Your access code will  in 90 days. If you need help or a new code, please contact your [facility] or call [phone] for assistance.        Care EveryWhere ID     This is your Care EveryWhere ID. This could be used by other organizations to access your [place] medical records  [id number]         Blood Pressure from Last 3 Encounters:   17 103/61   16 128/62   11/03/15 135/78    Weight from Last 3 Encounters:   17 72.2 kg (159 lb 3.2 oz)   16 67.1 kg (148 lb)   11/03/15 68.5 kg (151 lb)              Today, you had the following     No orders found for display         Today's Medication Changes          These changes are accurate as of 18 10:57 AM.  If you have any questions, ask your nurse or doctor.               Start taking these medicines.        Dose/Directions    TRAVATAN Z 0.004 % ophthalmic solution   Used for:  Neovascular glaucoma of both eyes, severe stage   Generic drug:  travoprost (AKILAH Free)   Started by:  Bethany William MD        Dose:  1 drop   Place 1 drop into the right eye At Bedtime   Quantity:  1 Bottle   Refills:  11         These medicines have changed or have updated prescriptions.        Dose/Directions    * dorzolamide-timolol 2-0.5 % ophthalmic solution   Commonly known as:  COSOPT   This may have changed:  Another medication with the same name was added. Make sure you understand how and when to take each.   Used for:  Acute angle-closure glaucoma of right eye   Changed by:  Bethany William MD        Dose:  1 drop   Place 1 drop into the right eye 2 times daily   Quantity:  1 Bottle   Refills:  0       * dorzolamide-timolol 2-0.5 % ophthalmic solution   Commonly known as:  COSOPT   This may have changed:  Another medication  with the same name was added. Make sure you understand how and when to take each.   Used for:  Neovascular glaucoma of both eyes, severe stage   Changed by:  Bethany William MD        Dose:  1 drop   Place 1 drop into the right eye 2 times daily   Quantity:  1 Bottle   Refills:  11       * dorzolamide-timolol 2-0.5 % ophthalmic solution   Commonly known as:  COSOPT   This may have changed:  You were already taking a medication with the same name, and this prescription was added. Make sure you understand how and when to take each.   Used for:  Neovascular glaucoma of both eyes, severe stage   Changed by:  Bethany William MD        Dose:  1 drop   Place 1 drop into the right eye 2 times daily   Quantity:  1 Bottle   Refills:  11       * Notice:  This list has 3 medication(s) that are the same as other medications prescribed for you. Read the directions carefully, and ask your doctor or other care provider to review them with you.         Where to get your medicines      These medications were sent to [facility] [address] [place] [address] [address] [facility]  [address], [facility] [address]    Hours:  24-hours Phone:  [phone]     dorzolamide-timolol 2-0.5 % ophthalmic solution    TRAVATAN Z 0.004 % ophthalmic solution                Primary Care Provider Office Phone # Fax #    Aliya See JEFFERY Carbajal [phone]       [facility] [address] [place] [address]        Equal Access to Services     TEETEE RICCI AH: Hadii aad ku hadasho Soomaali, waaxda luqadaha, qaybta kaalmada adeegyada, waxay idiin hayryleyn kirt díaz. So [facility] [phone].    ATENCIÓN: Si habla español, tiene a ron disposición servicios gratuitos de asistencia lingüística. Llame al [phone].    We comply with applicable federal civil rights laws and [place] laws. We do not discriminate on the basis of race, color, national origin, age, disability, sex,  sexual orientation, or gender identity.            Thank you!     Thank you for choosing [facility]  for your care. Our goal is always to provide you with excellent care. Hearing back from our patients is one way we can continue to improve our services. Please take a few minutes to complete the written survey that you may receive in the mail after your visit with us. Thank you!             Your Updated Medication List - Protect others around you: Learn how to safely use, store and throw away your medicines at www.disposemymeds.org.          This list is accurate as of 2/9/18 10:57 AM.  Always use your most recent med list.                   Brand Name Dispense Instructions for use Diagnosis    aspirin 81 MG tablet      Take 81 mg by mouth daily        * brimonidine 0.2 % ophthalmic solution    ALPHAGAN    1 Bottle    Place 1 drop into the right eye 2 times daily    Acute angle-closure glaucoma of right eye       * brimonidine 0.2 % ophthalmic solution    ALPHAGAN    15 mL    Place 1 drop into the right eye 3 times daily    Neovascular glaucoma of both eyes, severe stage       Carboxymethylcell-Hypromellose 0.25-0.3 % Gel     30 mL    Place 1 Application Into the left eye 6 times daily    NVG (neovascular glaucoma), left, severe stage       * dorzolamide-timolol 2-0.5 % ophthalmic solution    COSOPT    1 Bottle    Place 1 drop into the right eye 2 times daily    Acute angle-closure glaucoma of right eye       * dorzolamide-timolol 2-0.5 % ophthalmic solution    COSOPT    1 Bottle    Place 1 drop into the right eye 2 times daily    Neovascular glaucoma of both eyes, severe stage       * dorzolamide-timolol 2-0.5 % ophthalmic solution    COSOPT    1 Bottle    Place 1 drop into the right eye 2 times daily    Neovascular glaucoma of both eyes, severe stage       FOSAMAX PO      Take 70 mg by mouth every 30 days        gabapentin 400 MG capsule    NEURONTIN     Take 400 mg by mouth 3 times  daily        hydroxychloroquine 200 MG tablet    PLAQUENIL     Take 200 mg by mouth daily        inFLIXimab 100 MG injection    REMICADE     once        insulin detemir 100 UNIT/ML injection    LEVEMIR    3 mL    Inject 35 Units Subcutaneous At Bedtime    Type 2 diabetes mellitus with complication, with long-term current use of insulin (H)       LISINOPRIL PO      Take 10 mg by mouth daily        METOPROLOL TARTRATE PO      Take 25 mg by mouth daily        polyethylene glycol powder    MIRALAX/GLYCOLAX     Take 1 capful by mouth daily        prednisoLONE acetate 1 % ophthalmic susp    PRED FORTE    1 Bottle    Place 1 drop into the right eye 4 times daily    Neovascular glaucoma of both eyes, severe stage       PRILOSEC PO      Take 10 mg by mouth 2 times daily (before meals)        REGLAN PO      Take 5 mg by mouth        SIMVASTATIN PO      Take 10 mg by mouth At Bedtime        TRAVATAN Z 0.004 % ophthalmic solution   Generic drug:  travoprost (AKILAH Free)     1 Bottle    Place 1 drop into the right eye At Bedtime    Neovascular glaucoma of both eyes, severe stage       ZOFRAN PO      Take 4 mg by mouth        * Notice:  This list has 5 medication(s) that are the same as other medications prescribed for you. Read the directions carefully, and ask your doctor or other care provider to review them with you.

## 2018-02-09 NOTE — PROGRESS NOTES
1)NVG OU -- s/p Tube OD (11/8/17), s/p TSCPC OS (4/25/16), s/p Tube OS x2 (8/15 and last in 11/15 by Dr. Harris), H/O tube revision x2 (last on 2/23/16), H/O tube retraction and conj retraction per old notes, etiology of vision loss -- K pachy: 618/--  Tmax: 60+ for both eyes HVF:   CDR: OD:0.4 and OS:No view HRT/OCT: OD:RNFL WNL   FHX of Glc: No  Gonio:Closed Intolerant to: Asthma/COPD: No, on po BB but smokes Steroid Use: Yes, po pred for RA Kidney Stones: Mild CRI per patient (PCP is Dr. Carbajal at [facility] [place]) Sulfa Allergy: No IOP targets: OD:L-M20s and OS:Comfort --  -- pt adamantly does not want to remove the left eye if possible -- eye now non-painful -- IOP above target -- considering mCPC if IOP above target at next visit  2)DM c PDR -- s/p PRP OD (11/8/17), s/p Avastin OD -- following with Mandie Fitzgerald -- received IV Avastin OD in hospital   3)RA on Plaquenil and Remicade -- following with Rheumat[facility]  4)PCIOL OU  5)NLP OS   6)H/O CAD s/p CABG per old notes  7)Ptosis OS -- pt may be interested in ptosis repair OS    MD:Patient stopped Pred 2 weeks ago    Patient will discontinue and hold onto the Diamox 250mg pills.  DO NOT USE THESE PILLS.    Patient will also continue Genteal gel 4-6x/day in the left eye.     Patient will continue on Cosopt (Timolol/Dorzolamide) which is a blue top drop 2x/day (12 hours apart) in the RIGHT EYE ONLY, Alphagan (Brimonidine) which is a purple top drop 3x/day (8 hours apart) in the RIGHT EYE ONLY, and start Travatan which is a teal top drop at bedtime in the RIGHT EYE ONLY.      Patient will return to clinic in 3-4 weeks with repeat evaluation and IOP check.  Patient will also follow up with retina clinic.      Please be sure to call if you have any decrease in vision, increase in pain, flashing lights, redness, or a lot of drainage.      Attending Physician Attestation:  Complete documentation of historical and exam elements  from today's encounter can be found in the full encounter summary report (not reduplicated in this progress note). I personally obtained the chief complaint(s) and history of present illness.  I confirmed and edited as necessary the review of systems, past medical/surgical history, family history, social history, and examination findings as documented by others; and I examined the patient myself. I personally reviewed the relevant tests, images, and reports as documented above. I formulated and edited as necessary the assessment and plan and discussed the findings and management plan with the patient and family.  - Bethany William MD

## 2018-02-09 NOTE — PATIENT INSTRUCTIONS
Patient will discontinue and hold onto the Diamox 250mg pills.  DO NOT USE THESE PILLS.    Patient will also continue Genteal gel 4-6x/day in the left eye.     Patient will continue on Cosopt (Timolol/Dorzolamide) which is a blue top drop 2x/day (12 hours apart) in the RIGHT EYE ONLY, Alphagan (Brimonidine) which is a purple top drop 3x/day (8 hours apart) in the RIGHT EYE ONLY, and start Travatan which is a teal top drop at bedtime in the RIGHT EYE ONLY.      Patient will return to clinic in 3-4 weeks with repeat evaluation and IOP check.  Patient will also follow up with retina clinic.      Please be sure to call if you have any decrease in vision, increase in pain, flashing lights, redness, or a lot of drainage.

## 2018-02-13 DIAGNOSIS — H40.211 ACUTE ANGLE-CLOSURE GLAUCOMA OF RIGHT EYE: ICD-10-CM

## 2018-02-13 DIAGNOSIS — H40.53X3 NEOVASCULAR GLAUCOMA OF BOTH EYES, SEVERE STAGE: Primary | ICD-10-CM

## 2018-02-13 RX ORDER — DORZOLAMIDE HYDROCHLORIDE AND TIMOLOL MALEATE 20; 5 MG/ML; MG/ML
1 SOLUTION/ DROPS OPHTHALMIC 2 TIMES DAILY
Qty: 1 BOTTLE | Refills: 11 | Status: SHIPPED | OUTPATIENT
Start: 2018-02-13 | End: 2019-03-11

## 2018-02-19 DIAGNOSIS — H40.53X3: Primary | ICD-10-CM

## 2018-02-19 RX ORDER — DORZOLAMIDE HCL 20 MG/ML
1 SOLUTION/ DROPS OPHTHALMIC
Qty: 1 BOTTLE | Refills: 3 | Status: SHIPPED | OUTPATIENT
Start: 2018-02-19 | End: 2018-07-26

## 2018-02-19 RX ORDER — TIMOLOL MALEATE 5 MG/ML
1 SOLUTION/ DROPS OPHTHALMIC 2 TIMES DAILY
Qty: 15 ML | Refills: 3 | Status: SHIPPED | OUTPATIENT
Start: 2018-02-19 | End: 2019-01-23

## 2018-03-08 ENCOUNTER — TRANSFERRED RECORDS (OUTPATIENT)
Dept: HEALTH INFORMATION MANAGEMENT | Facility: CLINIC | Age: 58
End: 2018-03-08

## 2018-03-23 ENCOUNTER — OFFICE VISIT (OUTPATIENT)
Dept: OPHTHALMOLOGY | Facility: CLINIC | Age: 58
End: 2018-03-23
Payer: MEDICARE

## 2018-03-23 DIAGNOSIS — H40.53X3 NEOVASCULAR GLAUCOMA OF BOTH EYES, SEVERE STAGE: Primary | ICD-10-CM

## 2018-03-23 DIAGNOSIS — Z96.1 PSEUDOPHAKIA OF BOTH EYES: ICD-10-CM

## 2018-03-23 ASSESSMENT — VISUAL ACUITY
OD_SC: 20/25-
OS_SC: NLP
METHOD: SNELLEN - LINEAR

## 2018-03-23 ASSESSMENT — TONOMETRY
OD_IOP_MMHG: 34
OD_IOP_MMHG: 30
IOP_METHOD: APPLANATION
IOP_METHOD: APPLANATION

## 2018-03-23 ASSESSMENT — SLIT LAMP EXAM - LIDS: COMMENTS: NORMAL

## 2018-03-23 ASSESSMENT — CONF VISUAL FIELD
OS_SUPERIOR_TEMPORAL_RESTRICTION: 1
OS_INFERIOR_TEMPORAL_RESTRICTION: 1
OS_INFERIOR_NASAL_RESTRICTION: 1
OS_SUPERIOR_NASAL_RESTRICTION: 1

## 2018-03-23 ASSESSMENT — EXTERNAL EXAM - RIGHT EYE: OD_EXAM: NORMAL

## 2018-03-23 ASSESSMENT — EXTERNAL EXAM - LEFT EYE: OS_EXAM: NORMAL

## 2018-03-23 NOTE — MR AVS SNAPSHOT
After Visit Summary   3/23/2018    Connie Longoria    MRN: [id number]           Patient Information     Date Of Birth          1960        Visit Information        Provider Department      3/23/2018 8:30 AM Bethany William MD [facility]        Today's Diagnoses     Neovascular glaucoma of both eyes, severe stage    -  1    Pseudophakia of both eyes          Care Instructions    Patient will continue on Cosopt (Timolol/Dorzolamide) which is a blue top drop 2x/day (12 hours apart) in the RIGHT EYE ONLY, Alphagan (Brimonidine) which is a purple top drop 3x/day (8 hours apart) in the RIGHT EYE ONLY, and start Travatan which is a teal top drop at bedtime in the RIGHT EYE ONLY.      A long discussion of the risks, benefits, and alternatives including potential treatment and management options were had with patient and a decision was made to proceed with micropulse CPC (laser) in the RIGHT EYE.              Follow-ups after your visit        Your next 10 appointments already scheduled     Apr 03, 2018 10:30 AM CDT   ZAC Hand with Sherice Gutierrez OT   [facility] ([facility])    [address] [place] [id number]   [phone]            Apr 10, 2018 10:30 AM CDT   ZAC Hand with Sherice Gutierrez OT   [facility] ([facility])    [address] [place] [id number]   [phone]            Apr 17, 2018 10:30 AM CDT   ZAC Hand with Sherice Gutierrez OT   [facility] ([facility])    [address] [place] [id number]   [phone]            Apr 26, 2018 10:30 AM CDT   Post-Op with Bethany William MD   [facility] Clinic ([facility])    [address]  9[address]  [facility] [id number] [phone]              Who to contact     Please call your clinic at [phone] to:    Ask questions about your health    Make or cancel  appointments    Discuss your medicines    Learn about your test results    Speak to your doctor            Additional Information About Your Visit        MyChart Information     E[employer] is an electronic gateway that provides easy, online access to your medical records. With [employer], you can request a clinic appointment, read your test results, renew a prescription or communicate with your care team.     To sign up for [employer] visit the website at [email or web address]   You will be asked to enter the access code listed below, as well as some personal information. Please follow the directions to create your username and password.     Your access code is: [email or web address]  Expires: 5/10/2018 11:57 AM     Your access code will  in 90 days. If you need help or a new code, please contact your [facility] or call [phone] for assistance.        Care EveryWhere ID     This is your Care EveryWhere ID. This could be used by other organizations to access your [facility] medical records  [id number]         Blood Pressure from Last 3 Encounters:   17 103/61   16 128/62   11/03/15 135/78    Weight from Last 3 Encounters:   17 72.2 kg (159 lb 3.2 oz)   16 67.1 kg (148 lb)   11/03/15 68.5 kg (151 lb)              We Performed the Following     Sarah-Operative Worksheet (Glaucoma)        Primary Care Provider Office Phone # Fax #    Aliya See JEFFERY Carbajal [phone]       [facility] [address]  [place] [address]        Equal Access to Services     TEETEE RICCI : Hadii octavia ku hadasho Soomaali, waaxda luqadaha, qaybta kaalmada adeegyada, nikole díaz. So [facility] [phone].    ATENCIÓN: Si habla español, tiene a ron disposición servicios gratuitos de asistencia lingüística. Llame al [phone].    We comply with applicable federal civil rights laws and [place] laws. We do not discriminate on the  basis of race, color, national origin, age, disability, sex, sexual orientation, or gender identity.            Thank you!     Thank you for choosing [facility]  for your care. Our goal is always to provide you with excellent care. Hearing back from our patients is one way we can continue to improve our services. Please take a few minutes to complete the written survey that you may receive in the mail after your visit with us. Thank you!             Your Updated Medication List - Protect others around you: Learn how to safely use, store and throw away your medicines at www.disposemymeds.org.          This list is accurate as of 3/23/18 11:59 PM.  Always use your most recent med list.                   Brand Name Dispense Instructions for use Diagnosis    aspirin 81 MG tablet      Take 81 mg by mouth daily        * brimonidine 0.2 % ophthalmic solution    ALPHAGAN    1 Bottle    Place 1 drop into the right eye 2 times daily    Acute angle-closure glaucoma of right eye       * brimonidine 0.2 % ophthalmic solution    ALPHAGAN    15 mL    Place 1 drop into the right eye 3 times daily    Neovascular glaucoma of both eyes, severe stage       Carboxymethylcell-Hypromellose 0.25-0.3 % Gel     30 mL    Place 1 Application Into the left eye 6 times daily    NVG (neovascular glaucoma), left, severe stage       dorzolamide 2 % ophthalmic solution    TRUSOPT    1 Bottle    Place 1 drop into the right eye 2 times daily    Neovascular glaucoma, both eyes, severe stage       * dorzolamide-timolol 2-0.5 % ophthalmic solution    COSOPT    1 Bottle    Place 1 drop into the right eye 2 times daily    Acute angle-closure glaucoma of right eye       * dorzolamide-timolol 2-0.5 % ophthalmic solution    COSOPT    1 Bottle    Place 1 drop into the right eye 2 times daily    Neovascular glaucoma of both eyes, severe stage       * dorzolamide-timolol 2-0.5 % ophthalmic solution    COSOPT    1 Bottle    Place 1  drop into the right eye 2 times daily    Neovascular glaucoma of both eyes, severe stage       FOSAMAX PO      Take 70 mg by mouth every 30 days        gabapentin 400 MG capsule    NEURONTIN     Take 400 mg by mouth 3 times daily        hydroxychloroquine 200 MG tablet    PLAQUENIL     Take 200 mg by mouth daily        inFLIXimab 100 MG injection    REMICADE     once        insulin detemir 100 UNIT/ML injection    LEVEMIR    3 mL    Inject 35 Units Subcutaneous At Bedtime    Type 2 diabetes mellitus with complication, with long-term current use of insulin (H)       LISINOPRIL PO      Take 10 mg by mouth daily        METOPROLOL TARTRATE PO      Take 25 mg by mouth daily        polyethylene glycol powder    MIRALAX/GLYCOLAX     Take 1 capful by mouth daily        prednisoLONE acetate 1 % ophthalmic susp    PRED FORTE    1 Bottle    Place 1 drop into the right eye 4 times daily    Neovascular glaucoma of both eyes, severe stage       PRILOSEC PO      Take 10 mg by mouth 2 times daily (before meals)        REGLAN PO      Take 5 mg by mouth        SIMVASTATIN PO      Take 10 mg by mouth At Bedtime        timolol 0.5 % ophthalmic solution    TIMOPTIC    15 mL    Place 1 drop into the right eye 2 times daily    Neovascular glaucoma, both eyes, severe stage       TRAVATAN Z 0.004 % ophthalmic solution   Generic drug:  travoprost (AKILAH Free)     1 Bottle    Place 1 drop into the right eye At Bedtime    Neovascular glaucoma of both eyes, severe stage       ZOFRAN PO      Take 4 mg by mouth        * Notice:  This list has 5 medication(s) that are the same as other medications prescribed for you. Read the directions carefully, and ask your doctor or other care provider to review them with you.

## 2018-03-23 NOTE — NURSING NOTE
No chief complaint on file.    HPI    Affected eye(s):  Both   Symptoms:     No decreased vision      Duration:  6 weeks   Frequency:  Constant       Do you have eye pain now?:  No      Comments:  No changes or concerns with vision since last seen by Dr. EVANS     Genteal gel 4-6x/day in the left eye.      NOT USING COSOPT RIGHT NOW DUE TO BACKORDER, USING ALTERNATES, SEE BELOW    Alphagan (Brimonidine) which is a purple top drop 3x/day (8 hours apart) in the RIGHT EYE ONLY - last used last night 9:45pm  Travatan which is a teal top drop at bedtime in the RIGHT EYE ONLY  - last used @ 10:00pm    dorzolamide (TRUSOPT) 2 % ophthalmic solution 1 Bottle 3  last used @ 9:30pm     Sig - Route:  Place 1 drop into the right eye 2 times daily - Right Eye      timolol (TIMOPTIC) 0.5 % ophthalmic solution 15 mL 3 Last used @ 9:35pm    Sig - Route:  Place 1 drop into the right eye 2 times daily - Right Eye

## 2018-03-23 NOTE — PROGRESS NOTES
1)NVG OU -- s/p Tube OD (11/8/17), s/p TSCPC OS (4/25/16), s/p Tube OS x2 (8/15 and last in 11/15 by Dr. Harris), H/O tube revision x2 (last on 2/23/16), H/O tube retraction and conj retraction per old notes, etiology of vision loss -- K pachy: 618/--  Tmax: 60+ for both eyes HVF:   CDR: OD:0.4 and OS:No view HRT/OCT: OD:RNFL WNL   FHX of Glc: No  Gonio:Closed Intolerant to: Asthma/COPD: No, on po BB but smokes Steroid Use: Yes, po pred for RA Kidney Stones: Mild CRI per patient (PCP is Dr. Carbajal at [facility] in [place]) Sulfa Allergy: No IOP targets: OD:L-M20s and OS:Comfort --  -- pt adamantly does not want to remove the left eye if possible -- eye now non-painful -- IOP above target -- considering mCPC if IOP above target at next visit  2)DM c PDR -- s/p PRP OD (11/8/17), s/p Avastin OD -- following with Mandie Fitzgerald -- received IV Avastin OD in hospital   3)RA on Plaquenil and Remicade -- following with Rheumatologist [facility]  4)PCIOL OU  5)NLP OS   6)H/O CAD s/p CABG per old notes  7)Ptosis OS -- pt may be interested in ptosis repair OS      Patient will discontinue and hold onto the Diamox 250mg pills.  DO NOT USE THESE PILLS.    Patient will also continue Genteal gel 4-6x/day in the left eye.     Patient will continue on Cosopt (Timolol/Dorzolamide) which is a blue top drop 2x/day (12 hours apart) in the RIGHT EYE ONLY, Alphagan (Brimonidine) which is a purple top drop 3x/day (8 hours apart) in the RIGHT EYE ONLY, and start Travatan which is a teal top drop at bedtime in the RIGHT EYE ONLY.      A long discussion of the risks, benefits, and alternatives including potential treatment and management options were had with patient and a decision was made to proceed with micropulse CPC (laser) in the RIGHT EYE.         Attending Physician Attestation:  Complete documentation of historical and exam elements from today's encounter can be found in the full encounter summary report (not  reduplicated in this progress note). I personally obtained the chief complaint(s) and history of present illness.  I confirmed and edited as necessary the review of systems, past medical/surgical history, family history, social history, and examination findings as documented by others; and I examined the patient myself. I personally reviewed the relevant tests, images, and reports as documented above. I formulated and edited as necessary the assessment and plan and discussed the findings and management plan with the patient and family.  - Bethany William MD

## 2018-03-23 NOTE — PATIENT INSTRUCTIONS
Patient will continue on Cosopt (Timolol/Dorzolamide) which is a blue top drop 2x/day (12 hours apart) in the RIGHT EYE ONLY, Alphagan (Brimonidine) which is a purple top drop 3x/day (8 hours apart) in the RIGHT EYE ONLY, and start Travatan which is a teal top drop at bedtime in the RIGHT EYE ONLY.      A long discussion of the risks, benefits, and alternatives including potential treatment and management options were had with patient and a decision was made to proceed with micropulse CPC (laser) in the RIGHT EYE.

## 2018-03-26 ENCOUNTER — TRANSFERRED RECORDS (OUTPATIENT)
Dept: HEALTH INFORMATION MANAGEMENT | Facility: CLINIC | Age: 58
End: 2018-03-26

## 2018-03-27 ENCOUNTER — TELEPHONE (OUTPATIENT)
Dept: OPHTHALMOLOGY | Facility: CLINIC | Age: 58
End: 2018-03-27

## 2018-03-27 ENCOUNTER — THERAPY VISIT (OUTPATIENT)
Dept: OCCUPATIONAL THERAPY | Facility: CLINIC | Age: 58
End: 2018-03-27
Payer: MEDICARE

## 2018-03-27 DIAGNOSIS — M79.641 BILATERAL HAND PAIN: Primary | ICD-10-CM

## 2018-03-27 DIAGNOSIS — M05.742 RHEUMATOID ARTHRITIS INVOLVING BOTH HANDS WITH POSITIVE RHEUMATOID FACTOR (H): ICD-10-CM

## 2018-03-27 DIAGNOSIS — M79.642 BILATERAL HAND PAIN: Primary | ICD-10-CM

## 2018-03-27 DIAGNOSIS — M05.741 RHEUMATOID ARTHRITIS INVOLVING BOTH HANDS WITH POSITIVE RHEUMATOID FACTOR (H): ICD-10-CM

## 2018-03-27 PROCEDURE — 97165 OT EVAL LOW COMPLEX 30 MIN: CPT | Mod: GO | Performed by: OCCUPATIONAL THERAPIST

## 2018-03-27 PROCEDURE — 97110 THERAPEUTIC EXERCISES: CPT | Mod: GO | Performed by: OCCUPATIONAL THERAPIST

## 2018-03-27 PROCEDURE — G8984 CARRY CURRENT STATUS: HCPCS | Mod: GO | Performed by: OCCUPATIONAL THERAPIST

## 2018-03-27 PROCEDURE — G8985 CARRY GOAL STATUS: HCPCS | Mod: GO | Performed by: OCCUPATIONAL THERAPIST

## 2018-03-27 PROCEDURE — 97018 PARAFFIN BATH THERAPY: CPT | Mod: GO | Performed by: OCCUPATIONAL THERAPIST

## 2018-03-27 NOTE — PROGRESS NOTES
"Hand Therapy Initial Evaluation  Current Date:  3/27/2018    Referring MD: [place]  Return to MD: June 2018    Diagnosis:  B Hand Pain and Poor Flexibility  DOI: MD order 3/8/18   Post:  3 years since onset of symptoms    Subjective:  Connie Longoria is a 57 year old right hand dominant female.    Patient reports symptoms of pain, stiffness/loss of motion, weakness/loss of strength, edema, numbness and tingling  of the bilateral hands/fingers which occurred due to RA. Since onset symptoms are Gradually getting worse..  Special tests:  none recently.  Previous treatment: heat, rest.    General health as reported by patient is good.  Pertinent medical history includes:rheumatoid arthritis, osteoporosis, diabetes, high blood pressure, heart problems, stroke, depression , fibromyalgia, smoking, pain at rest/night  Medical allergies:none.  Surgical history: heart: Bypass, other: eye.  Medication history: see EMR - patient on multiple medications for RA.    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, carrying, chopping  Prior functional level:  Lives alone  Barriers include:transportation - patient uses friends and medical transportation  Mobility: Fall risk (concerned about falling, recent history of falling) - uses walker or cane out in public as needed  Transportation: public transportation, medical transportation and friends  Leisure activities/hobbies: shopping, visits nieces and nephews (17 yrs to 1 year)    Upper Extremity Functional Index Score:  SCORE:   Column Totals: /80: 5   (A lower score indicates greater disability.)    Objective:    Pain Report:  VAS(0-10) 3/27/18   At Rest: 7/10   With Use: 9/10   Location:  B Dorsal and volar hands and fingers, IPJ's and MPJ's  Description:  Burning, sharp, \"just painful\"  Frequency:  Constant and intermittent   Pain is worse:  During the day/same all the " time  Pain is exacerbated by:  Flexing the fingers, trying to make a fist  Pain is relieved by:  Heat, gabapentin, rest  Progression since onset:  Gradually worsening    ROM: Fingers  Extension/Flexion, AROM(PROM)  3/27/18 Date: 2012 3/27/18   Left Side: Right    AROM(PROM)    70  62  10  Index:  MP               PIP               DIP 70  45  10   62  58  0  Long:   MP               PIP               DIP 52  70  10   60  55  0  Ring:    MP                PIP                DIP 55  62  0   75  62  20  Small:   MP                PIP                DIP 62  55  15   10/45  +/47  50  35 Thumb:  MP                IP                RABD                PABD 10/55  +/0  45  35     ROM: Wrist AROM (PROM)  3/27/18 Date: 3/27/18   Left Side: Right   60 Extension 60   47 Flexion 25   20 RD 10   30 UD 30   85 Supination 85   70 Pronation 65     Strength:  [x]        Contraindicated - Deferred due to pain    Edema:  []        None  [x]         Mild   [x]        Moderate  []         Severe     []         of affected part   Comment: Swelling noted globally through fingers, primarily through IPJ's    Scar/Wound:  N/A    Sensation: Bilateral numbness/tingling through all fingers that is intermittent - can wake at night and is problematic in the morning. Patient has been told in the past she has R CTS, but also diabetic neuropathy    Palpation  Date 3/27/18   B MCPJ's Mild   B IPJ's Mild                 Assessment:  Patient presents with symptoms consistent with diagnosis as listed above with conservative intervention.     Patient's limitations or Problem List includes:  Pain, Decreased ROM/motion, Increased edema, Weakness, Decreased , Decreased pinch, Decreased coordination, Decreased dexterity, Tightness in musculature and Adherence in connective tissue of the bilateral wrist, hand, thumb, index finger, long finger, ring finger and small finger which interferes with the patient's ability to perform Self Care Tasks (dressing,  eating, bathing, hygiene/toileting), Recreational Activities and Household Chores as compared to previous level of function.    Rehab Potential:  Poor - Return to restricted activity    Patient will benefit from skilled Occupational Therapy to increase ROM, coordination, dexterity and independence with self care tasks and decrease pain and edema to return to previous activity level and resume normal daily tasks and to reach their rehab potential.    Barriers to Learning:  No barrier    Communication Issues:  Patient appears to be able to clearly communicate and understand verbal and written communication and follow directions correctly.    Chart Review: Detailed history review with patient    Identified Performance Deficits: bathing/showering, toileting, dressing, feeding, functional mobility, hygiene and grooming, health management and maintenance, home establishment and management, meal preparation and cleanup, sleep and leisure activities    Assessment of Occupational Performance:  5 or more Performance Deficits    Clinical Decision Making (Complexity): Low complexity    Treatment Explanation:  The following has been discussed with the patient:  RX ordered/plan of care  Anticipated outcomes  Possible risks and side effects    Plan:  Frequency:  1 X week, once daily  Duration:  for 6 weeks    Treatment Plan:  Modalities:  Fluidotherapy and Paraffin  Therapeutic Exercise:  AROM and Tendon Gliding  Manual Techniques:  Myofascial release and Manual edema mobilization  Orthotic Fabrication:  Finger based orthosis, Hand based orthosis and Forearm based orthosis  Self Care:  Self Care Tasks    Home Program:  Recommend home paraffin unit for pain and stiffness relief  Epsom salt soaks  Gentle AROM of fingers - ABD/ADD, tendon gliding    Next visit:   Paraffin  AROM  MFR?  Start on AE and joint protection education  Future visits: Consider resting hand splints vs wrist cock up splints    Discharge Plan:  Achieve all  LTG.  Independent in home treatment program.  Reach maximal therapeutic benefit.    Please see daily flow sheet for treatment and 1:1 time provided today.

## 2018-03-27 NOTE — LETTER
DEPARTMENT OF HEALTH AND HUMAN SERVICES  CENTERS FOR MEDICARE & MEDICAID SERVICES    PLAN/UPDATED PLAN OF PROGRESS FOR OUTPATIENT REHABILITATION  PATIENTS NAME:  Connie Longoria   : 1960  PROVID[facility] NUMBER:    [id number]  [id number]  PROVIDER NAME: [facility]  MEDICAL RECORD NUMBER: [id number]   START OF CARE DATE:  SOC Date: 18   TYPE:  PT  PRIMARY/TREATMENT DIAGNOSIS: (Pertinent Medical Diagnosis)   Bilateral hand pain  Rheumatoid arthritis involving both hands with positive rheumatoid factor (H)  VISITS FROM START OF CARE:  1 Rxs Used: 1   Hand Therapy Initial Evaluation  Current Date:  3/27/2018  Referring MD: [place]  Return to MD: 2018  Diagnosis:  B Hand Pain and Poor Flexibility  DOI: MD order 3/8/18   Post:  3 years since onset of symptoms  Subjective:  Connie Longoria is a 57 year old right hand dominant female.  Patient reports symptoms of pain, stiffness/loss of motion, weakness/loss of strength, edema, numbness and tingling  of the bilateral hands/fingers which occurred due to RA. Since onset symptoms are Gradually getting worse..  Special tests:  none recently.  Previous treatment: heat, rest.    General health as reported by patient is good.  Pertinent medical history includes:rheumatoid arthritis, osteoporosis, diabetes, high blood pressure, heart problems, stroke, depression , fibromyalgia, smoking, pain at rest/night  Medical allergies:none.  Surgical history: heart: Bypass, other: eye.  Medication history: see EMR - patient on multiple medications for RA.  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, carrying, chopping  Prior functional level:  Lives alone  Barriers include:transportation - patient uses friends and medical transportation  Mobility: Fall risk (concerned about falling, recent history of falling) - uses walker or cane out in public as  "needed  Transportation: public transportation, medical transportation and friends  Leisure activities/hobbies: shopping, visits nieces and nephews (17 yrs to 1 year)  Upper Extremity Functional Index Score:  SCORE:   Column Totals: /80: 5   (A lower score indicates greater disability.)  Objective:  Pain Report:  VAS(0-10) 3/27/18   At Rest: 10   With Use: 9/10   Location:  B Dorsal and volar hands and fingers, IPJ's and MPJ's  Description:  Burning, sharp, \"just painful\"  Frequency:  Constant and intermittent   PATIENTS NAME:  Connie Longoria   : 1960    Pain is worse:  During the day/same all the time  Pain is exacerbated by:  Flexing the fingers, trying to make a fist  Pain is relieved by:  Heat, gabapentin, rest  Progression since onset:  Gradually worsening  ROM: Fingers  Extension/Flexion, AROM(PROM)  3/27/18 Date: 2012 3/27/18   Left Side: Right    AROM(PROM)    70  62  10  Index:  MP               PIP               DIP 70  45  10   62  58  0  Long:   MP               PIP               DIP 52  70  10   60  55  0  Ring:    MP                PIP                DIP 55  62  0   75  62  20  Small:   MP                PIP                DIP 62  55  15   10/45  +/47  50  35 Thumb:  MP                IP                RABD                PABD 10/55  +/0  45  35   ROM: Wrist AROM (PROM)  3/27/18 Date: 3/27/18   Left Side: Right   60 Extension 60   47 Flexion 25   20 RD 10   30 UD 30   85 Supination 85   70 Pronation 65   Strength:  [x]        Contraindicated - Deferred due to pain  Edema:  []        None  [x]         Mild   [x]        Moderate  []         Severe     []         of affected part   Comment: Swelling noted globally through fingers, primarily through IPJ's  Scar/Wound:  N/A  Sensation: Bilateral numbness/tingling through all fingers that is intermittent - can wake at night and is problematic in the morning. Patient has been told in the past she has R CTS, but also diabetic neuropathy  Palpation  Date " 3/27/18   B MCPJ's Mild   B IPJ's Mild               Assessment:  PATIENTS NAME:  Connie Longoria   : 1960    Patient presents with symptoms consistent with diagnosis as listed above with conservative intervention.   Patient's limitations or Problem List includes:  Pain, Decreased ROM/motion, Increased edema, Weakness, Decreased , Decreased pinch, Decreased coordination, Decreased dexterity, Tightness in musculature and Adherence in connective tissue of the bilateral wrist, hand, thumb, index finger, long finger, ring finger and small finger which interferes with the patient's ability to perform Self Care Tasks (dressing, eating, bathing, hygiene/toileting), Recreational Activities and Household Chores as compared to previous level of function.  Rehab Potential:  Poor - Return to restricted activity  Patient will benefit from skilled Occupational Therapy to increase ROM, coordination, dexterity and independence with self care tasks and decrease pain and edema to return to previous activity level and resume normal daily tasks and to reach their rehab potential.  Barriers to Learning:  No barrier  Communication Issues:  Patient appears to be able to clearly communicate and understand verbal and written communication and follow directions correctly.  Chart Review: Detailed history review with patient  Identified Performance Deficits: bathing/showering, toileting, dressing, feeding, functional mobility, hygiene and grooming, health management and maintenance, home establishment and management, meal preparation and cleanup, sleep and leisure activities    Assessment of Occupational Performance:  5 or more Performance Deficits  Clinical Decision Making (Complexity): Low complexity  Treatment Explanation:  The following has been discussed with the patient:  RX ordered/plan of care  Anticipated outcomes  Possible risks and side effects  Plan:  Frequency:  1 X week, once daily  Duration:  for 6 weeks  Treatment  "Plan:  Modalities:  Fluidotherapy and Paraffin  Therapeutic Exercise:  AROM and Tendon Gliding  Manual Techniques:  Myofascial release and Manual edema mobilization  Orthotic Fabrication:  Finger based orthosis, Hand based orthosis and Forearm based orthosis  Self Care:  Self Care Tasks  Home Program:  Recommend home paraffin unit for pain and stiffness relief  Epsom salt soaks  Gentle AROM of fingers - ABD/ADD, tendon gliding  Next visit:   Paraffin  AROM  MFR?  Start on AE and joint protection education  Future visits: Consider resting hand splints vs wrist cock up splints  Discharge Plan:  Achieve all LTG.  Independent in home treatment program.  Reach maximal therapeutic benefit.  PATIENTS NAME:  Connie Longoria   : 1960    Caregiver Signature/Credentials _____________________________ Date ________         Sherice Gutierrez, MS, OTR/L, CHT  I have reviewed and certified the need for these services and plan of treatment while under my care.        PHYSICIAN'S SIGNATURE:   _____________________________________  Date___________    [place] MD     Certification period:  Beginning of Cert date period: 18 to  End of Cert period date: 18     Functional Level Progress Report: Please see attached \"Goal Flow sheet for Functional level.\"    ____X____ Continue Services or       ________ DC Services                Service dates: From  SOC Date: 18 date to present                         "

## 2018-04-02 ENCOUNTER — TRANSFERRED RECORDS (OUTPATIENT)
Dept: HEALTH INFORMATION MANAGEMENT | Facility: CLINIC | Age: 58
End: 2018-04-02

## 2018-04-23 ENCOUNTER — THERAPY VISIT (OUTPATIENT)
Dept: OCCUPATIONAL THERAPY | Facility: CLINIC | Age: 58
End: 2018-04-23
Payer: MEDICARE

## 2018-04-23 DIAGNOSIS — M79.641 BILATERAL HAND PAIN: ICD-10-CM

## 2018-04-23 DIAGNOSIS — M05.741 RHEUMATOID ARTHRITIS INVOLVING BOTH HANDS WITH POSITIVE RHEUMATOID FACTOR (H): ICD-10-CM

## 2018-04-23 DIAGNOSIS — M05.742 RHEUMATOID ARTHRITIS INVOLVING BOTH HANDS WITH POSITIVE RHEUMATOID FACTOR (H): ICD-10-CM

## 2018-04-23 DIAGNOSIS — M79.642 BILATERAL HAND PAIN: ICD-10-CM

## 2018-04-23 PROCEDURE — 97760 ORTHOTIC MGMT&TRAING 1ST ENC: CPT | Mod: GO | Performed by: OCCUPATIONAL THERAPIST

## 2018-04-23 PROCEDURE — 97110 THERAPEUTIC EXERCISES: CPT | Mod: GO | Performed by: OCCUPATIONAL THERAPIST

## 2018-04-26 ENCOUNTER — OFFICE VISIT (OUTPATIENT)
Dept: OPHTHALMOLOGY | Facility: CLINIC | Age: 58
End: 2018-04-26
Attending: OPHTHALMOLOGY
Payer: MEDICARE

## 2018-04-26 DIAGNOSIS — H40.53X3 NEOVASCULAR GLAUCOMA OF BOTH EYES, SEVERE STAGE: Primary | ICD-10-CM

## 2018-04-26 DIAGNOSIS — Z96.1 PSEUDOPHAKIA OF BOTH EYES: ICD-10-CM

## 2018-04-26 PROCEDURE — G0463 HOSPITAL OUTPT CLINIC VISIT: HCPCS | Mod: ZF

## 2018-04-26 ASSESSMENT — TONOMETRY
OD_IOP_MMHG: 26
IOP_METHOD: APPLANATION
OS_IOP_MMHG: 16

## 2018-04-26 ASSESSMENT — VISUAL ACUITY
OD_SC: 20/50
OS_SC: NLP
METHOD: SNELLEN - LINEAR
OD_SC+: +2

## 2018-04-26 ASSESSMENT — CONF VISUAL FIELD
OS_SUPERIOR_NASAL_RESTRICTION: 1
OS_INFERIOR_TEMPORAL_RESTRICTION: 1
OS_SUPERIOR_TEMPORAL_RESTRICTION: 1
OS_INFERIOR_NASAL_RESTRICTION: 1

## 2018-04-26 ASSESSMENT — SLIT LAMP EXAM - LIDS: COMMENTS: NORMAL

## 2018-04-26 ASSESSMENT — EXTERNAL EXAM - LEFT EYE: OS_EXAM: NORMAL

## 2018-04-26 ASSESSMENT — EXTERNAL EXAM - RIGHT EYE: OD_EXAM: NORMAL

## 2018-04-26 NOTE — NURSING NOTE
Chief Complaints and History of Present Illnesses   Patient presents with     Post Op (Ophthalmology) Right Eye      3 weeks after CPC      HPI    Last Eye Exam:  3/23/18   Affected eye(s):  Right   Symptoms:        Duration:  3 weeks   Frequency:  Constant       Do you have eye pain now?:  No      Comments:  Connie is here today for a post op after cpc  She says her RE feels good and vision seems about the same as before the laser.     John Ashley COT 10:52 AM April 26, 2018

## 2018-04-26 NOTE — PROGRESS NOTES
1)NVG OU -- s/p mCPC OD (4/2/18), s/p Tube OD (11/8/17), s/p TSCPC OS (4/25/16), s/p Tube OS x2 (8/15 and last in 11/15 by Dr. Harris), H/O tube revision x2 (last on 2/23/16), H/O tube retraction and conj retraction per old notes, etiology of vision loss -- K pachy: 618/--  Tmax: 60+ for both eyes HVF:   CDR: OD:0.4 and OS:No view HRT/OCT: OD:RNFL WNL   FHX of Glc: No  Gonio:Closed Intolerant to: Asthma/COPD: No, on po BB but smokes Steroid Use: Yes, po pred for RA Kidney Stones: Mild CRI per patient (PCP is Dr. Carbajal at [facility] in [place]) Sulfa Allergy: No IOP targets: OD:L-M20s and OS:Comfort --   2)DM c PDR -- s/p PRP OD (11/8/17), s/p Avastin OD -- following with Mandie Fitzgerald -- received IV Avastin OD in hospital   3)RA on Plaquenil and Remicade -- following with Rheumatologist [facility]  4)PCIOL OU  5)NLP OS   6)H/O CAD s/p CABG per old notes  7)Ptosis OS -- pt may be interested in ptosis repair OS    MD:d/c Pred 1.5 weeks ago      Patient will discontinue and hold onto the Diamox 250mg pills.  DO NOT USE THESE PILLS.    Patient will also continue Genteal gel 4-6x/day in the left eye.     Patient will continue on Cosopt (Timolol/Dorzolamide) which is a blue top drop 2x/day (12 hours apart) in the RIGHT EYE ONLY, Alphagan (Brimonidine) which is a purple top drop 2x/day (12 hours apart) in the RIGHT EYE ONLY, and Travatan which is a teal top drop at bedtime in the RIGHT EYE ONLY.  Patient will return to clinic in 1-2 months with dilated eye exam, visual field test, OCT with RNFL analysis.         Attending Physician Attestation:  Complete documentation of historical and exam elements from today's encounter can be found in the full encounter summary report (not reduplicated in this progress note). I personally obtained the chief complaint(s) and history of present illness.  I confirmed and edited as necessary the review of systems, past medical/surgical history, family history, social  history, and examination findings as documented by others; and I examined the patient myself. I personally reviewed the relevant tests, images, and reports as documented above. I formulated and edited as necessary the assessment and plan and discussed the findings and management plan with the patient and family.  - Bethany William MD

## 2018-04-26 NOTE — MR AVS SNAPSHOT
After Visit Summary   4/26/2018    Connie Longoria    MRN: [id number]           Patient Information     Date Of Birth          1960        Visit Information        Provider Department      4/26/2018 10:30 AM Bethany William MD [facility]        Today's Diagnoses     Neovascular glaucoma of both eyes, severe stage    -  1    Pseudophakia of both eyes          Care Instructions    Patient will continue on Cosopt (Timolol/Dorzolamide) which is a blue top drop 2x/day (12 hours apart) in the RIGHT EYE ONLY, Alphagan (Brimonidine) which is a purple top drop 2x/day (12 hours apart) in the RIGHT EYE ONLY, and Travatan which is a teal top drop at bedtime in the RIGHT EYE ONLY.  Patient will return to clinic in 1-2 months with dilated eye exam, visual field test, OCT with RNFL analysis.            Follow-ups after your visit        Follow-up notes from your care team     Return 2 months with dilated eye exam, visual field test, OCT with RNFL analysis.  .      Your next 10 appointments already scheduled     Apr 30, 2018 11:30 AM CDT   [facility] with Sherice Gutierrez, OT   [facility]    [address] [place] [id number]   [phone]            Jun 28, 2018 10:15 AM CDT   RETURN GLAUCOMA with Bethany William MD   [facility] ([facility])    [address]  [facility] [id number] [phone]              Who to contact     Please call your clinic at [phone] to:    Ask questions about your health    Make or cancel appointments    Discuss your medicines    Learn about your test results    Speak to your doctor            Additional Information About Your Visit        MyChart Information     EUROBOX is an electronic gateway that provides easy, online access to your medical records. With EUROBOX, you can request a clinic appointment, read your test results, renew a prescription or communicate  with your care team.     To sign up for Clim[employer]inderhart visit the website at [email or web address]   You will be asked to enter the access code listed below, as well as some personal information. Please follow the directions to create your username and password.     Your access code is: [email or web address]  Expires: 5/10/2018 11:57 AM     Your access code will  in 90 days. If you need help or a new code, please contact your [facility] or call [phone] for assistance.        Care EveryWhere ID     This is your Care EveryWhere ID. This could be used by other organizations to access your [facility] medical records  [id number]         Blood Pressure from Last 3 Encounters:   17 103/61   16 128/62   11/03/15 135/78    Weight from Last 3 Encounters:   17 72.2 kg (159 lb 3.2 oz)   16 67.1 kg (148 lb)   11/03/15 68.5 kg (151 lb)              Today, you had the following     No orders found for display       Primary Care Provider Office Phone # Fax #    Jessica Brand [phone]       [facility] [address]  [place] [address]        Equal Access to Services     TEETEE RICCI AH: Hadii octavia ku hadasho Soomaali, waaxda luqadaha, qaybta kaalmada adeegyada, waxay idiin hayryleyn kirt díaz. So [facility] [phone].    ATENCIÓN: Si habla español, tiene a ron disposición servicios gratuitos de asistencia lingüística. [place] al [phone].    We comply with applicable federal civil rights laws and [place] laws. We do not discriminate on the basis of race, color, national origin, age, disability, sex, sexual orientation, or gender identity.            Thank you!     Thank you for choosing EYE CLINIC  for your care. Our goal is always to provide you with excellent care. Hearing back from our patients is one way we can continue to improve our services. Please take a few minutes to complete the written survey that you may receive in the  mail after your visit with us. Thank you!             Your Updated Medication List - Protect others around you: Learn how to safely use, store and throw away your medicines at www.disposemymeds.org.          This list is accurate as of 4/26/18  1:09 PM.  Always use your most recent med list.                   Brand Name Dispense Instructions for use Diagnosis    aspirin 81 MG tablet      Take 81 mg by mouth daily        * brimonidine 0.2 % ophthalmic solution    ALPHAGAN    1 Bottle    Place 1 drop into the right eye 2 times daily    Acute angle-closure glaucoma of right eye       * brimonidine 0.2 % ophthalmic solution    ALPHAGAN    15 mL    Place 1 drop into the right eye 3 times daily    Neovascular glaucoma of both eyes, severe stage       Carboxymethylcell-Hypromellose 0.25-0.3 % Gel     30 mL    Place 1 Application Into the left eye 6 times daily    NVG (neovascular glaucoma), left, severe stage       dorzolamide 2 % ophthalmic solution    TRUSOPT    1 Bottle    Place 1 drop into the right eye 2 times daily    Neovascular glaucoma, both eyes, severe stage       * dorzolamide-timolol 2-0.5 % ophthalmic solution    COSOPT    1 Bottle    Place 1 drop into the right eye 2 times daily    Acute angle-closure glaucoma of right eye       * dorzolamide-timolol 2-0.5 % ophthalmic solution    COSOPT    1 Bottle    Place 1 drop into the right eye 2 times daily    Neovascular glaucoma of both eyes, severe stage       * dorzolamide-timolol 2-0.5 % ophthalmic solution    COSOPT    1 Bottle    Place 1 drop into the right eye 2 times daily    Neovascular glaucoma of both eyes, severe stage       FOSAMAX PO      Take 70 mg by mouth every 30 days        gabapentin 400 MG capsule    NEURONTIN     Take 400 mg by mouth 3 times daily        hydroxychloroquine 200 MG tablet    PLAQUENIL     Take 200 mg by mouth daily        inFLIXimab 100 MG injection    REMICADE     once        insulin detemir 100 UNIT/ML injection    LEVEMIR    3  mL    Inject 35 Units Subcutaneous At Bedtime    Type 2 diabetes mellitus with complication, with long-term current use of insulin (H)       LISINOPRIL PO      Take 10 mg by mouth daily        METOPROLOL TARTRATE PO      Take 25 mg by mouth daily        polyethylene glycol powder    MIRALAX/GLYCOLAX     Take 1 capful by mouth daily        prednisoLONE acetate 1 % ophthalmic susp    PRED FORTE    1 Bottle    Place 1 drop into the right eye 4 times daily    Neovascular glaucoma of both eyes, severe stage       PRILOSEC PO      Take 10 mg by mouth 2 times daily (before meals)        REGLAN PO      Take 5 mg by mouth        SIMVASTATIN PO      Take 10 mg by mouth At Bedtime        timolol 0.5 % ophthalmic solution    TIMOPTIC    15 mL    Place 1 drop into the right eye 2 times daily    Neovascular glaucoma, both eyes, severe stage       TRAVATAN Z 0.004 % ophthalmic solution   Generic drug:  travoprost (AKILAH Free)     1 Bottle    Place 1 drop into the right eye At Bedtime    Neovascular glaucoma of both eyes, severe stage       ZOFRAN PO      Take 4 mg by mouth        * Notice:  This list has 5 medication(s) that are the same as other medications prescribed for you. Read the directions carefully, and ask your doctor or other care provider to review them with you.

## 2018-04-26 NOTE — PATIENT INSTRUCTIONS
Patient will continue on Cosopt (Timolol/Dorzolamide) which is a blue top drop 2x/day (12 hours apart) in the RIGHT EYE ONLY, Alphagan (Brimonidine) which is a purple top drop 2x/day (12 hours apart) in the RIGHT EYE ONLY, and Travatan which is a teal top drop at bedtime in the RIGHT EYE ONLY.  Patient will return to clinic in 1-2 months with dilated eye exam, visual field test, OCT with RNFL analysis.

## 2018-06-11 ENCOUNTER — TRANSFERRED RECORDS (OUTPATIENT)
Dept: HEALTH INFORMATION MANAGEMENT | Facility: CLINIC | Age: 58
End: 2018-06-11

## 2018-06-18 DIAGNOSIS — H40.50X0 NEOVASCULAR GLAUCOMA: Primary | ICD-10-CM

## 2018-06-19 ENCOUNTER — OFFICE VISIT (OUTPATIENT)
Dept: OPHTHALMOLOGY | Facility: CLINIC | Age: 58
End: 2018-06-19
Attending: OPHTHALMOLOGY
Payer: MEDICARE

## 2018-06-19 DIAGNOSIS — H40.52X3 NVG (NEOVASCULAR GLAUCOMA), LEFT, SEVERE STAGE: Primary | ICD-10-CM

## 2018-06-19 DIAGNOSIS — H40.50X0 NEOVASCULAR GLAUCOMA: ICD-10-CM

## 2018-06-19 DIAGNOSIS — H40.53X3 NEOVASCULAR GLAUCOMA OF BOTH EYES, SEVERE STAGE: ICD-10-CM

## 2018-06-19 DIAGNOSIS — Z96.1 PSEUDOPHAKIA OF BOTH EYES: ICD-10-CM

## 2018-06-19 PROCEDURE — 92083 EXTENDED VISUAL FIELD XM: CPT | Mod: ZF | Performed by: OPHTHALMOLOGY

## 2018-06-19 PROCEDURE — 92133 CPTRZD OPH DX IMG PST SGM ON: CPT | Mod: ZF | Performed by: OPHTHALMOLOGY

## 2018-06-19 PROCEDURE — G0463 HOSPITAL OUTPT CLINIC VISIT: HCPCS | Mod: ZF

## 2018-06-19 ASSESSMENT — VISUAL ACUITY
OD_SC: 20/40
OS_SC: NLP
OD_PH_SC: 20/30
METHOD: SNELLEN - LINEAR

## 2018-06-19 ASSESSMENT — TONOMETRY
OD_IOP_MMHG: 32
OD_IOP_MMHG: 31
IOP_METHOD: APPLANATION
IOP_METHOD: TONOPEN
OS_IOP_MMHG: 10
OD_IOP_MMHG: 28
IOP_METHOD: APPLANATION

## 2018-06-19 ASSESSMENT — CONF VISUAL FIELD
OS_INFERIOR_NASAL_RESTRICTION: 1
OS_SUPERIOR_NASAL_RESTRICTION: 1
OS_INFERIOR_TEMPORAL_RESTRICTION: 1
OS_SUPERIOR_TEMPORAL_RESTRICTION: 1

## 2018-06-19 ASSESSMENT — CUP TO DISC RATIO: OD_RATIO: 0.3

## 2018-06-19 ASSESSMENT — SLIT LAMP EXAM - LIDS: COMMENTS: NORMAL

## 2018-06-19 ASSESSMENT — EXTERNAL EXAM - RIGHT EYE: OD_EXAM: NORMAL

## 2018-06-19 ASSESSMENT — EXTERNAL EXAM - LEFT EYE: OS_EXAM: NORMAL

## 2018-06-19 NOTE — NURSING NOTE
Chief Complaints and History of Present Illnesses   Patient presents with     Follow Up For     2 month follow up Neovascular glaucoma of both eyes, severe stage      HPI    Affected eye(s):  Both   Symptoms:     Floaters   No flashes   No glare   No halos   Photophobia         Do you have eye pain now?:  No      Comments:  Neovascular glaucoma of both eyes, severe stage   Blood sugar 125 yesterday didn't check today A1C 6.4/ 2 months ago  travatan qd RE  alphagan bid RE  Timolol BID RE  Dorzolamide bid RE  Need refills on drops  Edith Ken COA 10:48 AM June 19, 2018

## 2018-06-19 NOTE — PATIENT INSTRUCTIONS
Patient will continue on Timolol which is a yellow top drop in the morning in the RIGHT EYE ONLY, Trusopt (dorzolamide) which is an orange top drop 2x/day (12 hours apart) in the RIGHT EYE ONLY, and Alphagan (Brimonidine) which is a purple top drop 2x/day (12 hours apart) in the RIGHT EYE ONLY, and Travatan which is a teal top drop at bedtime in the RIGHT EYE ONLY.  Patient will return to clinic in 1-2 months with repeat IOP check and discussion about further surgery in the right eye.

## 2018-06-19 NOTE — MR AVS SNAPSHOT
After Visit Summary   6/19/2018    Connie Longoria    MRN: [id number]           Patient Information     Date Of Birth          1960        Visit Information        Provider Department      6/19/2018 10:15 AM Bethany William MD [facility]        Today's Diagnoses     NVG (neovascular glaucoma), left, severe stage    -  1    Neovascular glaucoma        Neovascular glaucoma of both eyes, severe stage        Pseudophakia of both eyes          Care Instructions    Patient will continue on Timolol which is a yellow top drop in the morning in the RIGHT EYE ONLY, Trusopt (dorzolamide) which is an orange top drop 2x/day (12 hours apart) in the RIGHT EYE ONLY, and Alphagan (Brimonidine) which is a purple top drop 2x/day (12 hours apart) in the RIGHT EYE ONLY, and Travatan which is a teal top drop at bedtime in the RIGHT EYE ONLY.  Patient will return to clinic in 1-2 months with repeat IOP check and discussion about further surgery in the right eye.             Follow-ups after your visit        Follow-up notes from your care team     Return 1-2 months with repeat IOP check and discussion about further surgery in the right eye.   .      Your next 10 appointments already scheduled     Jul 26, 2018  9:30 AM CDT   RETURN GLAUCOMA with Bethany William MD   Eye Clinic ([facility])    [address] [address]   [phone]              Future tests that were ordered for you today     Open Future Orders        Priority Expected Expires Ordered    DILATED FUNDUS EXAM Routine  6/18/2019 6/18/2018            Who to contact     Please call your clinic at [phone] to:    Ask questions about your health    Make or cancel appointments    Discuss your medicines    Learn about your test results    Speak to your doctor            Additional Information About Your Visit        MyChart Information     [employer] is an electronic gateway that  provides easy, online access to your medical records. With [employer], you can request a clinic appointment, read your test results, renew a prescription or communicate with your care team.     To sign up for [employer] visit the website at [email or web address]   You will be asked to enter the access code listed below, as well as some personal information. Please follow the directions to create your username and password.     Your access code is: [email or web address]  Expires: 2018  6:30 AM     Your access code will  in 90 days. If you need help or a new code, please contact your [facility] or call [phone] for assistance.        Care EveryWhere ID     This is your Care EveryWhere ID. This could be used by other organizations to access your [place] medical records  [id number]         Blood Pressure from Last 3 Encounters:   17 103/61   16 128/62   11/03/15 135/78    Weight from Last 3 Encounters:   17 72.2 kg (159 lb 3.2 oz)   16 67.1 kg (148 lb)   11/03/15 68.5 kg (151 lb)              We Performed the Following     OCT Optic Nerve RNFL Spectralis OU (both eyes)     OVF 24-2 Dynamic OD        Primary Care Provider Office Phone # Fax #    Jessica Brand [phone]       [facility] [address]  [place] [address]        Equal Access to Services     TEETEE RICCI AH: Hadii aad ku hadasho Soomaali, waaxda luqadaha, qaybta kaalmada adeegyada, nikole díaz. So [facility] [phone].    ATENCIÓN: Si habla rom, tiene a ron disposición servicios gratuitos de asistencia lingüística. Llame al [phone].    We comply with applicable federal civil rights laws and [place] laws. We do not discriminate on the basis of race, color, national origin, age, disability, sex, sexual orientation, or gender identity.            Thank you!     Thank you for choosing EYE CLINIC  for your care. Our goal is  always to provide you with excellent care. Hearing back from our patients is one way we can continue to improve our services. Please take a few minutes to complete the written survey that you may receive in the mail after your visit with us. Thank you!             Your Updated Medication List - Protect others around you: Learn how to safely use, store and throw away your medicines at www.disposemymeds.org.          This list is accurate as of 6/19/18 12:37 PM.  Always use your most recent med list.                   Brand Name Dispense Instructions for use Diagnosis    aspirin 81 MG tablet      Take 81 mg by mouth daily        * brimonidine 0.2 % ophthalmic solution    ALPHAGAN    1 Bottle    Place 1 drop into the right eye 2 times daily    Acute angle-closure glaucoma of right eye       * brimonidine 0.2 % ophthalmic solution    ALPHAGAN    15 mL    Place 1 drop into the right eye 3 times daily    Neovascular glaucoma of both eyes, severe stage       Carboxymethylcell-Hypromellose 0.25-0.3 % Gel     30 mL    Place 1 Application Into the left eye 6 times daily    NVG (neovascular glaucoma), left, severe stage       dorzolamide 2 % ophthalmic solution    TRUSOPT    1 Bottle    Place 1 drop into the right eye 2 times daily    Neovascular glaucoma, both eyes, severe stage       * dorzolamide-timolol 2-0.5 % ophthalmic solution    COSOPT    1 Bottle    Place 1 drop into the right eye 2 times daily    Acute angle-closure glaucoma of right eye       * dorzolamide-timolol 2-0.5 % ophthalmic solution    COSOPT    1 Bottle    Place 1 drop into the right eye 2 times daily    Neovascular glaucoma of both eyes, severe stage       * dorzolamide-timolol 2-0.5 % ophthalmic solution    COSOPT    1 Bottle    Place 1 drop into the right eye 2 times daily    Neovascular glaucoma of both eyes, severe stage       FOSAMAX PO      Take 70 mg by mouth every 30 days        gabapentin 400 MG capsule    NEURONTIN     Take 400 mg by mouth 3  times daily        hydroxychloroquine 200 MG tablet    PLAQUENIL     Take 200 mg by mouth daily        inFLIXimab 100 MG injection    REMICADE     once        insulin detemir 100 UNIT/ML injection    LEVEMIR    3 mL    Inject 35 Units Subcutaneous At Bedtime    Type 2 diabetes mellitus with complication, with long-term current use of insulin (H)       LISINOPRIL PO      Take 10 mg by mouth daily        METOPROLOL TARTRATE PO      Take 25 mg by mouth daily        polyethylene glycol powder    MIRALAX/GLYCOLAX     Take 1 capful by mouth daily        prednisoLONE acetate 1 % ophthalmic susp    PRED FORTE    1 Bottle    Place 1 drop into the right eye 4 times daily    Neovascular glaucoma of both eyes, severe stage       PRILOSEC PO      Take 10 mg by mouth 2 times daily (before meals)        REGLAN PO      Take 5 mg by mouth        SIMVASTATIN PO      Take 10 mg by mouth At Bedtime        timolol 0.5 % ophthalmic solution    TIMOPTIC    15 mL    Place 1 drop into the right eye 2 times daily    Neovascular glaucoma, both eyes, severe stage       TRAVATAN Z 0.004 % ophthalmic solution   Generic drug:  travoprost (AKILAH Free)     1 Bottle    Place 1 drop into the right eye At Bedtime    Neovascular glaucoma of both eyes, severe stage       ZOFRAN PO      Take 4 mg by mouth        * Notice:  This list has 5 medication(s) that are the same as other medications prescribed for you. Read the directions carefully, and ask your doctor or other care provider to review them with you.

## 2018-06-19 NOTE — PROGRESS NOTES
1)NVG OU -- s/p mCPC OD (4/2/18), s/p Tube OD (11/8/17), s/p TSCPC OS (4/25/16), s/p Tube OS x2 (8/15 and last in 11/15 by Dr. Harris), H/O tube revision x2 (last on 2/23/16), H/O tube retraction and conj retraction per old notes, etiology of vision loss -- K pachy: 618/--  Tmax: 60+ for both eyes HVF: OD:Sup edge points ?lid effect on first field  CDR: OD:0.3 and OS:No view HRT/OCT: OD:RNFL WNL   FHX of Glc: No  Gonio:Closed Intolerant to: Asthma/COPD: No, on po BB but smokes Steroid Use: Yes, was on po pred for RA in past  Kidney Stones: Mild CRI per patient (PCP is Dr. Carbajal at [facility] in [place]) Sulfa Allergy: No IOP targets: OD:L-M20s and OS:Comfort --   2)DM c PDR -- s/p PRP OD (11/8/17), s/p Avastin OD -- following with Mandie Fitzgerald -- received IV Avastin OD in hospital   3)RA on Plaquenil, prednisone and Remicade -- following with Rheumatologist [facility]  4)PCIOL OU  5)NLP OS   6)H/O CAD s/p CABG per old notes  7)Ptosis OS -- pt may be interested in ptosis repair OS      Patient will discontinue and hold onto the Diamox 250mg pills.  DO NOT USE THESE PILLS.    Patient will also continue Genteal gel 4-6x/day in the left eye.     Patient will continue on Timolol which is a yellow top drop in the morning in the RIGHT EYE ONLY, Trusopt (dorzolamide) which is an orange top drop 2x/day (12 hours apart) in the RIGHT EYE ONLY, and Alphagan (Brimonidine) which is a purple top drop 2x/day (12 hours apart) in the RIGHT EYE ONLY, and Travatan which is a teal top drop at bedtime in the RIGHT EYE ONLY.  Patient will return to clinic in 1-2 months with repeat IOP check and discussion about further surgery in the right eye.          Attending Physician Attestation:  Complete documentation of historical and exam elements from today's encounter can be found in the full encounter summary report (not reduplicated in this progress note). I personally obtained the chief complaint(s) and history of  present illness.  I confirmed and edited as necessary the review of systems, past medical/surgical history, family history, social history, and examination findings as documented by others; and I examined the patient myself. I personally reviewed the relevant tests, images, and reports as documented above. I formulated and edited as necessary the assessment and plan and discussed the findings and management plan with the patient and family.  - Bethany William MD

## 2018-07-26 ENCOUNTER — OFFICE VISIT (OUTPATIENT)
Dept: OPHTHALMOLOGY | Facility: CLINIC | Age: 58
End: 2018-07-26
Attending: OPHTHALMOLOGY
Payer: MEDICARE

## 2018-07-26 DIAGNOSIS — H40.53X3 NEOVASCULAR GLAUCOMA OF BOTH EYES, SEVERE STAGE: Primary | ICD-10-CM

## 2018-07-26 DIAGNOSIS — H40.211 ACUTE ANGLE-CLOSURE GLAUCOMA OF RIGHT EYE: ICD-10-CM

## 2018-07-26 PROCEDURE — 92133 CPTRZD OPH DX IMG PST SGM ON: CPT | Mod: ZF | Performed by: OPHTHALMOLOGY

## 2018-07-26 PROCEDURE — G0463 HOSPITAL OUTPT CLINIC VISIT: HCPCS | Mod: ZF

## 2018-07-26 ASSESSMENT — TONOMETRY
OS_IOP_MMHG: 11
IOP_METHOD: APPLANATION

## 2018-07-26 ASSESSMENT — CUP TO DISC RATIO: OD_RATIO: 0.3

## 2018-07-26 ASSESSMENT — EXTERNAL EXAM - LEFT EYE: OS_EXAM: NORMAL

## 2018-07-26 ASSESSMENT — VISUAL ACUITY
OD_SC: 20/30
OD_SC+: -1
METHOD: SNELLEN - LINEAR
OS_SC: NLP

## 2018-07-26 ASSESSMENT — CONF VISUAL FIELD
OS_INFERIOR_TEMPORAL_RESTRICTION: 1
OS_SUPERIOR_NASAL_RESTRICTION: 1
OD_NORMAL: 1
OS_INFERIOR_NASAL_RESTRICTION: 1
OS_SUPERIOR_TEMPORAL_RESTRICTION: 1

## 2018-07-26 ASSESSMENT — EXTERNAL EXAM - RIGHT EYE: OD_EXAM: NORMAL

## 2018-07-26 ASSESSMENT — SLIT LAMP EXAM - LIDS: COMMENTS: NORMAL

## 2018-07-26 NOTE — MR AVS SNAPSHOT
After Visit Summary   2018    Connie Longoria    MRN: [id number]           Patient Information     Date Of Birth          1960        Visit Information        Provider Department      2018 9:30 AM Bethany William MD [facility]        Today's Diagnoses     Neovascular glaucoma of both eyes, severe stage    -  1    Acute angle-closure glaucoma of right eye          Care Instructions    Patient will continue on Cosopt (Timolol/Dorzolamide) which is a blue top drop 2x/day (12 hours apart) in the RIGHT EYE ONLY and Alphagan (Brimonidine) which is a purple top drop 2x/day (12 hours apart) in the RIGHT EYE ONLY, and Travatan which is a teal top drop at bedtime in the RIGHT EYE ONLY.  Patient will return to clinic in 1-2 months with repeat IOP check, dilated eye exam, disc photos and discussion about further surgery in the right eye.             Follow-ups after your visit        Follow-up notes from your care team     Return 1-2 months with repeat IOP check, dilated eye exam, disc photos and discussion about further surgery.      Who to contact     Please call your clinic at [phone] to:    Ask questions about your health    Make or cancel appointments    Discuss your medicines    Learn about your test results    Speak to your doctor            Additional Information About Your Visit        MyChart Information     [employer] is an electronic gateway that provides easy, online access to your medical records. With [employer], you can request a clinic appointment, read your test results, renew a prescription or communicate with your care team.     To sign up for [employer] visit the website at [email or web address]   You will be asked to enter the access code listed below, as well as some personal information. Please follow the directions to create your username and password.     Your access code is: [email or web address]  Expires: 2018  6:30 AM     Your access code will  in 90 days. If you  need help or a new code, please contact your [facility] or call [phone] for assistance.        Care EveryWhere ID     This is your Care EveryWhere ID. This could be used by other organizations to access your [place] medical records  [id number]         Blood Pressure from Last 3 Encounters:   11/05/17 103/61   02/23/16 128/62   11/03/15 135/78    Weight from Last 3 Encounters:   11/05/17 72.2 kg (159 lb 3.2 oz)   02/22/16 67.1 kg (148 lb)   11/03/15 68.5 kg (151 lb)              We Performed the Following     OCT Optic Nerve RNFL Spectralis OU (both eyes)        Primary Care Provider Office Phone # Fax #    Jessica Brand [phone]       [facility] [address]  [place] [address]        Equal Access to Services     TEETEE RICCI : Hadii aad ku hadasho Soanthony, waaxda luqadaha, qaybta kaalmada adeegyada, nikole ty hayterese yancey . So [facility] [phone].    ATENCIÓN: Si habla español, tiene a ron disposición servicios gratuitos de asistencia lingüística. Llame al [phone].    We comply with applicable federal civil rights laws and [place] laws. We do not discriminate on the basis of race, color, national origin, age, disability, sex, sexual orientation, or gender identity.            Thank you!     Thank you for choosing EYE CLINIC  for your care. Our goal is always to provide you with excellent care. Hearing back from our patients is one way we can continue to improve our services. Please take a few minutes to complete the written survey that you may receive in the mail after your visit with us. Thank you!             Your Updated Medication List - Protect others around you: Learn how to safely use, store and throw away your medicines at www.disposemymeds.org.          This list is accurate as of 7/26/18 10:37 AM.  Always use your most recent med list.                   Brand Name Dispense Instructions for use Diagnosis     aspirin 81 MG tablet      Take 81 mg by mouth daily        * brimonidine 0.2 % ophthalmic solution    ALPHAGAN    1 Bottle    Place 1 drop into the right eye 2 times daily    Acute angle-closure glaucoma of right eye       * brimonidine 0.2 % ophthalmic solution    ALPHAGAN    15 mL    Place 1 drop into the right eye 3 times daily    Neovascular glaucoma of both eyes, severe stage       Carboxymethylcell-Hypromellose 0.25-0.3 % Gel     30 mL    Place 1 Application Into the left eye 6 times daily    NVG (neovascular glaucoma), left, severe stage       * dorzolamide-timolol 2-0.5 % ophthalmic solution    COSOPT    1 Bottle    Place 1 drop into the right eye 2 times daily    Neovascular glaucoma of both eyes, severe stage       * dorzolamide-timolol 2-0.5 % ophthalmic solution    COSOPT    1 Bottle    Place 1 drop into the right eye 2 times daily    Neovascular glaucoma of both eyes, severe stage       FOSAMAX PO      Take 70 mg by mouth every 30 days        gabapentin 400 MG capsule    NEURONTIN     Take 400 mg by mouth 3 times daily        hydroxychloroquine 200 MG tablet    PLAQUENIL     Take 200 mg by mouth daily        inFLIXimab 100 MG injection    REMICADE     once        insulin detemir 100 UNIT/ML injection    LEVEMIR    3 mL    Inject 35 Units Subcutaneous At Bedtime    Type 2 diabetes mellitus with complication, with long-term current use of insulin (H)       LISINOPRIL PO      Take 10 mg by mouth daily        METOPROLOL TARTRATE PO      Take 25 mg by mouth daily        polyethylene glycol powder    MIRALAX/GLYCOLAX     Take 1 capful by mouth daily        prednisoLONE acetate 1 % ophthalmic susp    PRED FORTE    1 Bottle    Place 1 drop into the right eye 4 times daily    Neovascular glaucoma of both eyes, severe stage       PRILOSEC PO      Take 10 mg by mouth 2 times daily (before meals)        REGLAN PO      Take 5 mg by mouth        SIMVASTATIN PO      Take 10 mg by mouth At Bedtime        timolol 0.5  % ophthalmic solution    TIMOPTIC    15 mL    Place 1 drop into the right eye 2 times daily    Neovascular glaucoma, both eyes, severe stage       TRAVATAN Z 0.004 % ophthalmic solution   Generic drug:  travoprost (AKILAH Free)     1 Bottle    Place 1 drop into the right eye At Bedtime    Neovascular glaucoma of both eyes, severe stage       ZOFRAN PO      Take 4 mg by mouth        * Notice:  This list has 4 medication(s) that are the same as other medications prescribed for you. Read the directions carefully, and ask your doctor or other care provider to review them with you.

## 2018-07-26 NOTE — PROGRESS NOTES
1)NVG OU -- s/p mCPC OD (4/2/18), s/p Tube OD (11/8/17), s/p TSCPC OS (4/25/16), s/p Tube OS x2 (8/15 and last in 11/15 by Dr. Harris), H/O tube revision x2 (last on 2/23/16), H/O tube retraction and conj retraction per old notes, etiology of vision loss -- K pachy: 618/--  Tmax: 60+ for both eyes HVF: OD:Sup edge points ?lid effect on first field  CDR: OD:0.3 and OS:No view HRT/OCT: OD:RNFL WNL   FHX of Glc: No  Gonio:Closed Intolerant to: Asthma/COPD: No, on po BB but smokes Steroid Use: Yes, was on po pred for RA in past  Kidney Stones: Mild CRI per patient (PCP is Dr. Carbajal at [facility] [place] in [place]) Sulfa Allergy: No IOP targets: OD:L-M20s and OS:Comfort -- rec mCPC vs tube   2)DM c PDR -- s/p PRP OD (11/8/17), s/p Avastin OD -- following with Mandie Fitzgerald -- received IV Avastin OD in hospital   3)RA on Plaquenil, prednisone and Remicade -- following with Rheumatologist [facility]  4)PCIOL OU  5)NLP OS   6)H/O CAD s/p CABG per old notes  7)Ptosis OS -- pt may be interested in ptosis repair OS      Patient will discontinue and hold onto the Diamox 250mg pills.  DO NOT USE THESE PILLS.    Patient will also continue Genteal gel 4-6x/day in the left eye.     Patient will continue on Cosopt (Timolol/Dorzolamide) which is a blue top drop 2x/day (12 hours apart) in the RIGHT EYE ONLY and Alphagan (Brimonidine) which is a purple top drop 2x/day (12 hours apart) in the RIGHT EYE ONLY, and Travatan which is a teal top drop at bedtime in the RIGHT EYE ONLY.  Patient will return to clinic in 1-2 months with repeat IOP check, visual field test, dilated eye exam, disc photos and discussion about further surgery in the right eye.          Attending Physician Attestation:  Complete documentation of historical and exam elements from today's encounter can be found in the full encounter summary report (not reduplicated in this progress note). I personally obtained the chief complaint(s) and history of  present illness.  I confirmed and edited as necessary the review of systems, past medical/surgical history, family history, social history, and examination findings as documented by others; and I examined the patient myself. I personally reviewed the relevant tests, images, and reports as documented above. I formulated and edited as necessary the assessment and plan and discussed the findings and management plan with the patient and family.  - Bethany William MD

## 2018-07-26 NOTE — NURSING NOTE
Chief Complaints and History of Present Illnesses   Patient presents with     Follow Up For     NVG      HPI    Affected eye(s):  Both   Symptoms:        Frequency:  Constant       Do you have eye pain now?:  No      Comments:  States va is the same since last visit  +floater that will arch around occ  +red, watery and dry  Terrie Brooks COT 9:50 AM July 26, 2018

## 2018-07-26 NOTE — PATIENT INSTRUCTIONS
Patient will continue on Cosopt (Timolol/Dorzolamide) which is a blue top drop 2x/day (12 hours apart) in the RIGHT EYE ONLY and Alphagan (Brimonidine) which is a purple top drop 2x/day (12 hours apart) in the RIGHT EYE ONLY, and Travatan which is a teal top drop at bedtime in the RIGHT EYE ONLY.  Patient will return to clinic in 1-2 months with repeat IOP check, visual field test, dilated eye exam, disc photos and discussion about further surgery in the right eye.

## 2018-08-28 ENCOUNTER — OFFICE VISIT (OUTPATIENT)
Dept: OPHTHALMOLOGY | Facility: CLINIC | Age: 58
End: 2018-08-28
Attending: OPHTHALMOLOGY
Payer: MEDICARE

## 2018-08-28 DIAGNOSIS — H40.53X3 NEOVASCULAR GLAUCOMA OF BOTH EYES, SEVERE STAGE: Primary | ICD-10-CM

## 2018-08-28 DIAGNOSIS — Z96.1 PSEUDOPHAKIA OF BOTH EYES: ICD-10-CM

## 2018-08-28 DIAGNOSIS — H40.50X0 NEOVASCULAR GLAUCOMA: ICD-10-CM

## 2018-08-28 DIAGNOSIS — H40.9 GLAUCOMA: ICD-10-CM

## 2018-08-28 PROCEDURE — 92083 EXTENDED VISUAL FIELD XM: CPT | Mod: ZF | Performed by: OPHTHALMOLOGY

## 2018-08-28 PROCEDURE — G0463 HOSPITAL OUTPT CLINIC VISIT: HCPCS | Mod: ZF

## 2018-08-28 PROCEDURE — 92250 FUNDUS PHOTOGRAPHY W/I&R: CPT | Mod: ZF | Performed by: OPHTHALMOLOGY

## 2018-08-28 RX ORDER — DORZOLAMIDE HCL 20 MG/ML
1 SOLUTION/ DROPS OPHTHALMIC 2 TIMES DAILY
COMMUNITY
End: 2018-09-27

## 2018-08-28 ASSESSMENT — VISUAL ACUITY
OD_SC+: -2
OD_SC: 20/30
OS_SC: NLP
METHOD: SNELLEN - LINEAR

## 2018-08-28 ASSESSMENT — TONOMETRY
OD_IOP_MMHG: 30
OD_IOP_MMHG: 30
IOP_METHOD: TONOPEN
IOP_METHOD: APPLANATION
OS_IOP_MMHG: 19
OD_IOP_MMHG: 36
IOP_METHOD: TONOPEN
OS_IOP_MMHG: 18

## 2018-08-28 ASSESSMENT — EXTERNAL EXAM - RIGHT EYE: OD_EXAM: NORMAL

## 2018-08-28 ASSESSMENT — SLIT LAMP EXAM - LIDS: COMMENTS: NORMAL

## 2018-08-28 ASSESSMENT — CONF VISUAL FIELD
OS_INFERIOR_NASAL_RESTRICTION: 1
OS_INFERIOR_TEMPORAL_RESTRICTION: 1
OS_SUPERIOR_TEMPORAL_RESTRICTION: 1
OS_SUPERIOR_NASAL_RESTRICTION: 1

## 2018-08-28 ASSESSMENT — CUP TO DISC RATIO: OD_RATIO: 0.3

## 2018-08-28 ASSESSMENT — EXTERNAL EXAM - LEFT EYE: OS_EXAM: NORMAL

## 2018-08-28 NOTE — PATIENT INSTRUCTIONS
Patient will continue on Cosopt (Timolol/Dorzolamide) which is a blue top drop 2x/day (12 hours apart) in the RIGHT EYE ONLY and Alphagan (Brimonidine) which is a purple top drop 2x/day (12 hours apart) in the RIGHT EYE ONLY, and Travatan which is a teal top drop at bedtime in the RIGHT EYE ONLY.  Patient will return to clinic in 1-2 months with repeat IOP check and discussion about further surgery in the right eye.

## 2018-08-28 NOTE — MR AVS SNAPSHOT
After Visit Summary   8/28/2018    Connie Longoria    MRN: [id number]           Patient Information     Date Of Birth          1960        Visit Information        Provider Department      8/28/2018 9:30 AM Bethany [facility] MD [facility]        Today's Diagnoses     Neovascular glaucoma of both eyes, severe stage    -  1    Glaucoma        Neovascular glaucoma        Pseudophakia of both eyes          Care Instructions    Patient will continue on Cosopt (Timolol/Dorzolamide) which is a blue top drop 2x/day (12 hours apart) in the RIGHT EYE ONLY and Alphagan (Brimonidine) which is a purple top drop 2x/day (12 hours apart) in the RIGHT EYE ONLY, and Travatan which is a teal top drop at bedtime in the RIGHT EYE ONLY.  Patient will return to clinic in 1-2 months with repeat IOP check and discussion about further surgery in the right eye.             Follow-ups after your visit        Follow-up notes from your care team     Return  1-2 months with repeat IOP check and discussion about further surgery in the right eye.   .      Your next 10 appointments already scheduled     Sep 27, 2018  9:30 AM CDT   RETURN GLAUCOMA with Bethany William MD   Eye Clinic ([facility])    [address] [facility] [address] [place] [id number]   [phone]              Future tests that were ordered for you today     Open Future Orders        Priority Expected Expires Ordered    Fundus Photos OU (both eyes) Routine  2/25/2020 8/28/2018            Who to contact     Please call your clinic at [phone] to:    Ask questions about your health    Make or cancel appointments    Discuss your medicines    Learn about your test results    Speak to your doctor            Additional Information About Your Visit        Care EveryWhere ID     This is your Care EveryWhere ID. This could be used by other organizations to access your [facility] medical records  [id number]          Blood Pressure from Last 3 Encounters:   11/05/17 103/61   02/23/16 128/62   11/03/15 135/78    Weight from Last 3 Encounters:   11/05/17 72.2 kg (159 lb 3.2 oz)   02/22/16 67.1 kg (148 lb)   11/03/15 68.5 kg (151 lb)              We Performed the Following     Fundus Photos OU (both eyes)     OVF 24-2 Dynamic OD          Today's Medication Changes          These changes are accurate as of 8/28/18 12:02 PM.  If you have any questions, ask your nurse or doctor.               These medicines have changed or have updated prescriptions.        Dose/Directions    brimonidine 0.2 % ophthalmic solution   Commonly known as:  ALPHAGAN   This may have changed:  Another medication with the same name was removed. Continue taking this medication, and follow the directions you see here.   Used for:  Acute angle-closure glaucoma of right eye   Changed by:  Bethany William MD        Dose:  1 drop   Place 1 drop into the right eye 2 times daily   Quantity:  1 Bottle   Refills:  0       dorzolamide-timolol 2-0.5 % ophthalmic solution   Commonly known as:  COSOPT   This may have changed:  Another medication with the same name was removed. Continue taking this medication, and follow the directions you see here.   Used for:  Neovascular glaucoma of both eyes, severe stage   Changed by:  Bethany William MD        Dose:  1 drop   Place 1 drop into the right eye 2 times daily   Quantity:  1 Bottle   Refills:  11         Stop taking these medicines if you haven't already. Please contact your care team if you have questions.     prednisoLONE acetate 1 % ophthalmic susp   Commonly known as:  PRED FORTE   Stopped by:  Bethany William MD                    Primary Care Provider Office Phone # Fax #    Jessica Brand [phone]       [id number]  [place] [address]        Equal Access to Services     TEETEE RICCI : sanjeev Rosado, tammy clemens  nikole joségeorge barbramary landis'aan ah. So [facility] [phone].    ATENCIÓN: Si alexandre cueto, tiene a ron disposición servicios gratuitos de asistencia lingüística. Nidia al [phone].    We comply with applicable federal civil rights laws and [place] laws. We do not discriminate on the basis of race, color, national origin, age, disability, sex, sexual orientation, or gender identity.            Thank you!     Thank you for choosing EYE CLINIC  for your care. Our goal is always to provide you with excellent care. Hearing back from our patients is one way we can continue to improve our services. Please take a few minutes to complete the written survey that you may receive in the mail after your visit with us. Thank you!             Your Updated Medication List - Protect others around you: Learn how to safely use, store and throw away your medicines at www.disposemymeds.org.          This list is accurate as of 8/28/18 12:02 PM.  Always use your most recent med list.                   Brand Name Dispense Instructions for use Diagnosis    aspirin 81 MG tablet      Take 81 mg by mouth daily        brimonidine 0.2 % ophthalmic solution    ALPHAGAN    1 Bottle    Place 1 drop into the right eye 2 times daily    Acute angle-closure glaucoma of right eye       Carboxymethylcell-Hypromellose 0.25-0.3 % Gel     30 mL    Place 1 Application Into the left eye 6 times daily    NVG (neovascular glaucoma), left, severe stage       dorzolamide 2 % ophthalmic solution    TRUSOPT     Place 1 drop into the right eye 2 times daily    Neovascular glaucoma of both eyes, severe stage       dorzolamide-timolol 2-0.5 % ophthalmic solution    COSOPT    1 Bottle    Place 1 drop into the right eye 2 times daily    Neovascular glaucoma of both eyes, severe stage       FOSAMAX PO      Take 70 mg by mouth every 30 days        gabapentin 400 MG capsule    NEURONTIN     Take 400 mg by mouth 3 times daily        hydroxychloroquine  200 MG tablet    PLAQUENIL     Take 200 mg by mouth daily        inFLIXimab 100 MG injection    REMICADE     once        insulin detemir 100 UNIT/ML injection    LEVEMIR    3 mL    Inject 35 Units Subcutaneous At Bedtime    Type 2 diabetes mellitus with complication, with long-term current use of insulin (H)       LISINOPRIL PO      Take 10 mg by mouth daily        METOPROLOL TARTRATE PO      Take 25 mg by mouth daily        polyethylene glycol powder    MIRALAX/GLYCOLAX     Take 1 capful by mouth daily        PRILOSEC PO      Take 10 mg by mouth 2 times daily (before meals)        REGLAN PO      Take 5 mg by mouth        SIMVASTATIN PO      Take 10 mg by mouth At Bedtime        timolol 0.5 % ophthalmic solution    TIMOPTIC    15 mL    Place 1 drop into the right eye 2 times daily    Neovascular glaucoma, both eyes, severe stage       TRAVATAN Z 0.004 % ophthalmic solution   Generic drug:  travoprost (AKILAH Free)     1 Bottle    Place 1 drop into the right eye At Bedtime    Neovascular glaucoma of both eyes, severe stage       ZOFRAN PO      Take 4 mg by mouth

## 2018-08-28 NOTE — PROGRESS NOTES
1)NVG OU -- s/p mCPC OD (4/2/18), s/p Tube OD (11/8/17), s/p TSCPC OS (4/25/16), s/p Tube OS x2 (8/15 and last in 11/15 by Dr. Harris), H/O tube revision x2 (last on 2/23/16), H/O tube retraction and conj retraction per old notes, etiology of vision loss -- K pachy: 618/--  Tmax: 60+ for both eyes HVF: OD:Sup edge points ?lid effect on first field  CDR: OD:0.3 and OS:No view HRT/OCT: OD:RNFL WNL   FHX of Glc: No  Gonio:Closed Intolerant to: Asthma/COPD: No, on po BB but smokes Steroid Use: Yes, was on po pred for RA in past  Kidney Stones: Mild CRI per patient (PCP is Dr. Carbajal at [facility] in [place]) Sulfa Allergy: No IOP targets: OD:L-M20s and OS:Comfort -- rec mCPC vs tube   2)DM c PDR -- s/p PRP OD (11/8/17), s/p Avastin OD -- following with Mandie Fitzgerald -- received IV Avastin OD in hospital   3)RA on Plaquenil, prednisone and Remicade -- following with Rheumatologist [facility]  4)PCIOL OU  5)NLP OS   6)H/O CAD s/p CABG per old notes  7)Ptosis OS -- pt may be interested in ptosis repair OS    MD:pt did not use AM gtts -- ?etiology of elevated IOPs -- pt usually doesn't take AM gtts when coming to Drs office    Patient will discontinue and hold onto the Diamox 250mg pills.  DO NOT USE THESE PILLS.    Patient will also continue Genteal gel 4-6x/day in the left eye.     MD:pt prefers doing tube OD over mCPC OD at this time     Patient will continue on Cosopt (Timolol/Dorzolamide) which is a blue top drop 2x/day (12 hours apart) in the RIGHT EYE ONLY and Alphagan (Brimonidine) which is a purple top drop 2x/day (12 hours apart) in the RIGHT EYE ONLY, and Travatan which is a teal top drop at bedtime in the RIGHT EYE ONLY.  Patient will return to clinic in 1-2 months with repeat IOP check and discussion about further surgery in the right eye.         Resident note:  Here for IOP check  IOP today 36 right eye  HVF: right eye shows superior and early inf arc defect (FN 30%)  retinal nerve fiber  layer stable compared to last visit, thickness not significantly different from 2017 (scan done in MME)  Plan for [facility] right eye    Abdi Jacobo MD  PGY-3 Ophthalmology Resident  [phone]    Attending Physician Attestation:  Complete documentation of historical and exam elements from today's encounter can be found in the full encounter summary report (not reduplicated in this progress note). I personally obtained the chief complaint(s) and history of present illness.  I confirmed and edited as necessary the review of systems, past medical/surgical history, family history, social history, and examination findings as documented by others; and I examined the patient myself. I personally reviewed the relevant tests, images, and reports as documented above. I formulated and edited as necessary the assessment and plan and discussed the findings and management plan with the patient and family.  - Bethany William MD

## 2018-08-28 NOTE — NURSING NOTE
Chief Complaints and History of Present Illnesses   Patient presents with     Neovascular Glaucoma Follow Up     HPI    Affected eye(s):  Right   Symptoms:     No decreased vision   No floaters   Dryness (Comment: BE / mild)   No eye discharge      Duration:  1 month   Frequency:  Constant       Do you have eye pain now?:  No      Comments:  Trusopt BID to RE / 9:30 pm last brenna  Timolol BID to RE /  9:35 pm last brenna  Brimonidine BID to RE / 9:40 pm last brenna  Latanoprost at bedtime to RE / 9:45 last brenna  Genteal drops 4 times a day to BE / generic    Monse Tenorio COT 9:53 AM 08/28/2018

## 2018-09-10 ENCOUNTER — TRANSFERRED RECORDS (OUTPATIENT)
Dept: HEALTH INFORMATION MANAGEMENT | Facility: CLINIC | Age: 58
End: 2018-09-10

## 2018-09-27 ENCOUNTER — OFFICE VISIT (OUTPATIENT)
Dept: OPHTHALMOLOGY | Facility: CLINIC | Age: 58
End: 2018-09-27
Attending: OPHTHALMOLOGY
Payer: MEDICARE

## 2018-09-27 DIAGNOSIS — H40.53X3 NEOVASCULAR GLAUCOMA OF BOTH EYES, SEVERE STAGE: Primary | ICD-10-CM

## 2018-09-27 DIAGNOSIS — Z96.1 PSEUDOPHAKIA OF BOTH EYES: ICD-10-CM

## 2018-09-27 PROCEDURE — G0463 HOSPITAL OUTPT CLINIC VISIT: HCPCS | Mod: ZF

## 2018-09-27 RX ORDER — TRAVOPROST 0.04 MG/ML
1 SOLUTION/ DROPS OPHTHALMIC AT BEDTIME
Qty: 1 BOTTLE | Refills: 11 | Status: SHIPPED | OUTPATIENT
Start: 2018-09-27 | End: 2019-02-19

## 2018-09-27 ASSESSMENT — CONF VISUAL FIELD
OS_INFERIOR_TEMPORAL_RESTRICTION: 1
OS_SUPERIOR_TEMPORAL_RESTRICTION: 1
OD_NORMAL: 1
OS_INFERIOR_NASAL_RESTRICTION: 1
OS_SUPERIOR_NASAL_RESTRICTION: 1
METHOD: COUNTING FINGERS

## 2018-09-27 ASSESSMENT — TONOMETRY
OS_IOP_MMHG: 10
OD_IOP_MMHG: 17
IOP_METHOD: APPLANATION
OD_IOP_MMHG: 24
OD_IOP_MMHG: 19
OD_IOP_MMHG: 19
IOP_METHOD: TONOPEN
IOP_METHOD: TONOPEN
IOP_METHOD: APPLANATION
OS_IOP_MMHG: 24
IOP_METHOD: APPLANATION
OD_IOP_MMHG: 21

## 2018-09-27 ASSESSMENT — VISUAL ACUITY
METHOD: SNELLEN - LINEAR
OD_SC: 20/40
OS_SC: NLP
OD_PH_SC: 20/25-2
OD_SC+: -2

## 2018-09-27 ASSESSMENT — EXTERNAL EXAM - LEFT EYE: OS_EXAM: NORMAL

## 2018-09-27 ASSESSMENT — SLIT LAMP EXAM - LIDS: COMMENTS: NORMAL

## 2018-09-27 ASSESSMENT — EXTERNAL EXAM - RIGHT EYE: OD_EXAM: NORMAL

## 2018-09-27 ASSESSMENT — CUP TO DISC RATIO: OD_RATIO: 0.3

## 2018-09-27 NOTE — NURSING NOTE
Chief Complaints and History of Present Illnesses   Patient presents with     Follow Up For     Neovascular glaucoma of both eyes, severe stage      HPI    Last Eye Exam:  8/28/18   Affected eye(s):  Both   Symptoms:        Unknown duration    Frequency:  Constant       Do you have eye pain now?:  No      Comments:  Connie is here for a follow up of Neovascular glaucoma of both eyes, severe stage   She states no change in RE vision.   Says she has been compliant with the three eye medications RE    John Ashley COT 10:04 AM September 27, 2018

## 2018-09-27 NOTE — PROGRESS NOTES
1)NVG OU -- s/p mCPC OD (4/2/18), s/p Tube OD (11/8/17), s/p TSCPC OS (4/25/16), s/p Tube OS x2 (8/15 and last in 11/15 by Dr. Harris), H/O tube revision x2 (last on 2/23/16), H/O tube retraction and conj retraction per old notes, etiology of vision loss -- K pachy: 618/--  Tmax: 60+ for both eyes HVF: OD:Sup edge points ?lid effect on first field  CDR: OD:0.3 and OS:No view HRT/OCT: OD:RNFL WNL   FHX of Glc: No  Gonio:Closed Intolerant to: Asthma/COPD: No, on po BB but smokes Steroid Use: Yes, was on po pred for RA in past  Kidney Stones: Mild CRI per patient (PCP is Dr. Carbajal at [facility] in [place]) Sulfa Allergy: No IOP targets: OD:L-M20s and OS:Comfort -- rec mCPC vs tube   2)DM c PDR -- s/p PRP OD (11/8/17), s/p Avastin OD -- following with Mandie Fitzgerald -- received IV Avastin OD in hospital   3)RA on Plaquenil, prednisone and Remicade -- following with Rheumatologist [facility]  4)PCIOL OU  5)NLP OS   6)H/O CAD s/p CABG per old notes  7)Ptosis OS -- pt may be interested in ptosis repair OS    MD: pt usually didn't take AM gtts when coming to Drs office -- ?etiology of elevated IOPs in past (improved on 9/18 check)    Patient will discontinue and hold onto the Diamox 250mg pills.  DO NOT USE THESE PILLS.    Patient will also continue Genteal gel 4-6x/day in the left eye.     MD:pt prefers doing tube OD over mCPC OD at this time     Monocular precautions discussion.  Patient will continue on Cosopt (Timolol/Dorzolamide) which is a blue top drop 2x/day (12 hours apart) in the RIGHT EYE ONLY and Alphagan (Brimonidine) which is a purple top drop 2x/day (12 hours apart) in the RIGHT EYE ONLY, and Travatan which is a teal top drop at bedtime in the RIGHT EYE ONLY.  Patient will return to clinic in 3-4 months with repeat IOP check, visual field test and discussion about further surgery in the right eye.       Resident Note (Please use final note above)  Here for IOP check; used her gtts this  AM  IOP today 19 right eye from low 30s last visit (pt previously did not use gtts in AM prior to appointments)  CPM; return 4-6 months with OCT rNFL and HVF    Felipe Weinstein M.D.  PGY-3, Ophthalmology      Attending Physician Attestation:  Complete documentation of historical and exam elements from today's encounter can be found in the full encounter summary report (not reduplicated in this progress note). I personally obtained the chief complaint(s) and history of present illness.  I confirmed and edited as necessary the review of systems, past medical/surgical history, family history, social history, and examination findings as documented by others; and I examined the patient myself. I personally reviewed the relevant tests, images, and reports as documented above. I formulated and edited as necessary the assessment and plan and discussed the findings and management plan with the patient and family.  - Bethany William MD

## 2018-10-16 ENCOUNTER — TRANSFERRED RECORDS (OUTPATIENT)
Dept: HEALTH INFORMATION MANAGEMENT | Facility: CLINIC | Age: 58
End: 2018-10-16

## 2018-11-08 ENCOUNTER — TRANSFERRED RECORDS (OUTPATIENT)
Dept: HEALTH INFORMATION MANAGEMENT | Facility: CLINIC | Age: 58
End: 2018-11-08

## 2018-12-21 ENCOUNTER — OFFICE VISIT (OUTPATIENT)
Dept: OPHTHALMOLOGY | Facility: CLINIC | Age: 58
End: 2018-12-21
Attending: OPHTHALMOLOGY
Payer: MEDICARE

## 2018-12-21 DIAGNOSIS — H40.52X3 NVG (NEOVASCULAR GLAUCOMA), LEFT, SEVERE STAGE: ICD-10-CM

## 2018-12-21 DIAGNOSIS — H40.53X3 NEOVASCULAR GLAUCOMA OF BOTH EYES, SEVERE STAGE: Primary | ICD-10-CM

## 2018-12-21 PROCEDURE — 92083 EXTENDED VISUAL FIELD XM: CPT | Mod: ZF | Performed by: OPHTHALMOLOGY

## 2018-12-21 PROCEDURE — G0463 HOSPITAL OUTPT CLINIC VISIT: HCPCS | Mod: ZF

## 2018-12-21 ASSESSMENT — CONF VISUAL FIELD
OS_INFERIOR_TEMPORAL_RESTRICTION: 1
OS_INFERIOR_NASAL_RESTRICTION: 1
OS_SUPERIOR_TEMPORAL_RESTRICTION: 1
OS_SUPERIOR_NASAL_RESTRICTION: 1
OD_NORMAL: 1

## 2018-12-21 ASSESSMENT — VISUAL ACUITY
METHOD: SNELLEN - LINEAR
OD_PH_SC+: -2
OD_SC+: -2
OD_PH_SC: 20/25
OS_SC: NLP
OD_SC: 20/30

## 2018-12-21 ASSESSMENT — SLIT LAMP EXAM - LIDS: COMMENTS: NORMAL

## 2018-12-21 ASSESSMENT — TONOMETRY
IOP_METHOD: APPLANATION
OD_IOP_MMHG: 25
IOP_METHOD: APPLANATION
OS_IOP_MMHG: 16
OD_IOP_MMHG: 18

## 2018-12-21 ASSESSMENT — EXTERNAL EXAM - LEFT EYE: OS_EXAM: NORMAL

## 2018-12-21 ASSESSMENT — EXTERNAL EXAM - RIGHT EYE: OD_EXAM: NORMAL

## 2018-12-21 NOTE — NURSING NOTE
Chief Complaint(s) and History of Present Illness(es)     Glaucoma Follow-Up     Laterality: right eye    Associated symptoms: dryness.  Negative for eye pain, redness and tearing      Treatment side effects: none    Compliance with Treatment: always    Pain scale: 0/10              Comments     Pt here for a 3 month f/u for NVG each eye. Pt states vision in RE is   doing ok, no big changes since last visit. Pt states compliant with drops     Cosopt (Timolol/Dorzolamide) which is a blue top drop 2x/day (12 hours   apart) in the RIGHT EYE ONLY and Alphagan (Brimonidine) which is a purple   top drop 2x/day (12 hours apart) in the RIGHT EYE ONLY, and Travatan which   is a teal top drop at bedtime in the RIGHT EYE ONLY.     Aliya Bob, [facility] 9:38 AM December 21, 2018

## 2018-12-21 NOTE — PROGRESS NOTES
1)NVG OU -- s/p mCPC OD (4/2/18), s/p Tube OD (11/8/17), s/p TSCPC OS (4/25/16), s/p Tube OS x2 (8/15 and last in 11/15 by Dr. Harris), H/O tube revision x2 (last on 2/23/16), H/O tube retraction and conj retraction per old notes, etiology of vision loss -- K pachy: 618/--  Tmax: 60+ for both eyes HVF: OD:Sup edge points ?lid effect on first field  CDR: OD:0.3 and OS:No view HRT/OCT: OD:RNFL WNL   FHX of Glc: No  Gonio:Closed Intolerant to: Asthma/COPD: No, on po BB but smokes Steroid Use: Yes, was on po pred for RA in past  Kidney Stones: Mild CRI per patient (PCP is Dr. Carbajal at [facility] in [place]) Sulfa Allergy: No IOP targets: OD:L-M20s and OS:Comfort -- rec mCPC vs tube   2)DM c PDR -- s/p PRP OD (11/8/17), s/p Avastin OD -- following with Mandie Fitzgerald -- received IV Avastin OD in hospital   3)RA on Plaquenil, prednisone and Remicade -- following with Rheumatologist [facility]  4)PCIOL OU  5)NLP OS   6)H/O CAD s/p CABG per old notes  7)Ptosis OS -- pt may be interested in ptosis repair OS    MD: pt usually didn't take AM gtts when coming to Drs office -- ?etiology of elevated IOPs in past (improved on 9/18 check)    Patient will discontinue and hold onto the Diamox 250mg pills.  DO NOT USE THESE PILLS.    Patient will also continue Genteal gel 4-6x/day in the left eye.     MD:pt prefers doing tube OD over mCPC OD at this time     Monocular precautions discussion.  Patient will continue on Cosopt (Timolol/Dorzolamide) which is a blue top drop 2x/day (12 hours apart) in the RIGHT EYE ONLY and Alphagan (Brimonidine) which is a purple top drop 2x/day (12 hours apart) in the RIGHT EYE ONLY, and Travatan which is a teal top drop at bedtime in the RIGHT EYE ONLY.  Patient will return to clinic in 3-4 months with repeat IOP check, visual field test, dilated eye exam, OCT with RNFL analysis and discussion about further surgery in the right eye.         Attending Physician Attestation:   Complete documentation of historical and exam elements from today's encounter can be found in the full encounter summary report (not reduplicated in this progress note). I personally obtained the chief complaint(s) and history of present illness.  I confirmed and edited as necessary the review of systems, past medical/surgical history, family history, social history, and examination findings as documented by others; and I examined the patient myself. I personally reviewed the relevant tests, images, and reports as documented above. I formulated and edited as necessary the assessment and plan and discussed the findings and management plan with the patient and family.  - Bethany William MD

## 2018-12-21 NOTE — PATIENT INSTRUCTIONS
Monocular precautions discussion.  Patient will continue on Cosopt (Timolol/Dorzolamide) which is a blue top drop 2x/day (12 hours apart) in the RIGHT EYE ONLY and Alphagan (Brimonidine) which is a purple top drop 2x/day (12 hours apart) in the RIGHT EYE ONLY, and Travatan which is a teal top drop at bedtime in the RIGHT EYE ONLY.  Patient will return to clinic in 3-4 months with repeat IOP check, visual field test, dilated eye exam, OCT with RNFL analysis and discussion about further surgery in the right eye.

## 2018-12-21 NOTE — TELEPHONE ENCOUNTER
"Received request for dorzolamide refill, but per notes patient shouldn't be on it, it was only to fill during cosopt back order    Spoke to Fidel at pharmacy, she has refills available on all of the other drops she should be on     \"Patient will continue on Cosopt (Timolol/Dorzolamide) which is a blue top drop 2x/day (12 hours apart) in the RIGHT EYE ONLY and Alphagan (Brimonidine) which is a purple top drop 2x/day (12 hours apart) in the RIGHT EYE ONLY, and Travatan which is a teal top drop at bedtime in the RIGHT EYE ONLY.\"    Fidel made a note that dorzolamide is only during cosopt back order    MARILEE Figueroa 9:24 AM 12/21/2018      "

## 2019-01-15 DIAGNOSIS — H40.211 ACUTE ANGLE-CLOSURE GLAUCOMA OF RIGHT EYE: ICD-10-CM

## 2019-01-15 RX ORDER — BRIMONIDINE TARTRATE 2 MG/ML
1 SOLUTION/ DROPS OPHTHALMIC 2 TIMES DAILY
Qty: 15 ML | Refills: 3 | Status: SHIPPED | OUTPATIENT
Start: 2019-01-15 | End: 2020-06-23

## 2019-01-23 ENCOUNTER — TELEPHONE (OUTPATIENT)
Dept: OPHTHALMOLOGY | Facility: CLINIC | Age: 59
End: 2019-01-23

## 2019-01-23 DIAGNOSIS — H40.53X3: ICD-10-CM

## 2019-01-23 RX ORDER — DORZOLAMIDE HCL 20 MG/ML
1 SOLUTION/ DROPS OPHTHALMIC 2 TIMES DAILY
Qty: 1 BOTTLE | Refills: 3 | Status: SHIPPED | OUTPATIENT
Start: 2019-01-23 | End: 2019-04-12

## 2019-01-23 RX ORDER — TIMOLOL MALEATE 5 MG/ML
1 SOLUTION/ DROPS OPHTHALMIC DAILY
Qty: 5 ML | Refills: 0 | Status: SHIPPED | OUTPATIENT
Start: 2019-01-23 | End: 2019-02-25

## 2019-01-23 NOTE — TELEPHONE ENCOUNTER
Health Call Center    Phone Message    May a detailed message be left on voicemail: yes    Reason for Call: Other: Sary is calling from the [facility] wanting to know if Dr. Bethany William is going to refill the prescription for dorzolamide 5-0.2% per Tammy. Pt is at the pharmacy now looking for the prescription of dorzolamide. Please call the pharmacy back ASAP. Thank you     Action Taken: Message routed to:  [facility] ([facility] Eye

## 2019-02-19 DIAGNOSIS — H40.53X3 NEOVASCULAR GLAUCOMA OF BOTH EYES, SEVERE STAGE: ICD-10-CM

## 2019-02-19 RX ORDER — TRAVOPROST 0.04 MG/ML
1 SOLUTION/ DROPS OPHTHALMIC AT BEDTIME
Qty: 1 BOTTLE | Refills: 1 | Status: SHIPPED | OUTPATIENT
Start: 2019-02-19 | End: 2019-05-20

## 2019-02-25 DIAGNOSIS — H40.53X3: ICD-10-CM

## 2019-02-25 RX ORDER — TIMOLOL MALEATE 5 MG/ML
1 SOLUTION/ DROPS OPHTHALMIC DAILY
Qty: 5 ML | Refills: 3 | Status: SHIPPED | OUTPATIENT
Start: 2019-02-25 | End: 2019-04-12

## 2019-03-11 DIAGNOSIS — H40.53X3 NEOVASCULAR GLAUCOMA OF BOTH EYES, SEVERE STAGE: ICD-10-CM

## 2019-03-11 RX ORDER — DORZOLAMIDE HYDROCHLORIDE AND TIMOLOL MALEATE 20; 5 MG/ML; MG/ML
1 SOLUTION/ DROPS OPHTHALMIC 2 TIMES DAILY
Qty: 1 BOTTLE | Refills: 11 | Status: SHIPPED | OUTPATIENT
Start: 2019-03-11 | End: 2020-06-23

## 2019-03-14 ENCOUNTER — TRANSFERRED RECORDS (OUTPATIENT)
Dept: HEALTH INFORMATION MANAGEMENT | Facility: CLINIC | Age: 59
End: 2019-03-14

## 2019-04-02 ENCOUNTER — TRANSFERRED RECORDS (OUTPATIENT)
Dept: HEALTH INFORMATION MANAGEMENT | Facility: CLINIC | Age: 59
End: 2019-04-02

## 2019-04-04 ENCOUNTER — OFFICE VISIT (OUTPATIENT)
Dept: OPHTHALMOLOGY | Facility: CLINIC | Age: 59
End: 2019-04-04
Attending: OPHTHALMOLOGY
Payer: MEDICARE

## 2019-04-04 DIAGNOSIS — H40.52X3 NVG (NEOVASCULAR GLAUCOMA), LEFT, SEVERE STAGE: Primary | ICD-10-CM

## 2019-04-04 PROCEDURE — 92083 EXTENDED VISUAL FIELD XM: CPT | Mod: ZF | Performed by: OPHTHALMOLOGY

## 2019-04-04 PROCEDURE — G0463 HOSPITAL OUTPT CLINIC VISIT: HCPCS | Mod: ZF

## 2019-04-04 PROCEDURE — 92133 CPTRZD OPH DX IMG PST SGM ON: CPT | Mod: ZF | Performed by: OPHTHALMOLOGY

## 2019-04-04 ASSESSMENT — EXTERNAL EXAM - RIGHT EYE: OD_EXAM: NORMAL

## 2019-04-04 ASSESSMENT — TONOMETRY
IOP_METHOD: APPLANATION
OS_IOP_MMHG: 24
OD_IOP_MMHG: 30
IOP_METHOD: APPLANATION
OD_IOP_MMHG: 22
IOP_METHOD: TONOPEN
OD_IOP_MMHG: 30

## 2019-04-04 ASSESSMENT — VISUAL ACUITY
OD_PH_SC: 20/30
OS_SC: NLP
OD_SC: 20/40
METHOD: SNELLEN - LINEAR

## 2019-04-04 ASSESSMENT — CONF VISUAL FIELD
OS_INFERIOR_NASAL_RESTRICTION: 1
METHOD: COUNTING FINGERS
OS_INFERIOR_TEMPORAL_RESTRICTION: 1
OS_SUPERIOR_NASAL_RESTRICTION: 1
OS_SUPERIOR_TEMPORAL_RESTRICTION: 1

## 2019-04-04 ASSESSMENT — EXTERNAL EXAM - LEFT EYE: OS_EXAM: NORMAL

## 2019-04-04 ASSESSMENT — SLIT LAMP EXAM - LIDS: COMMENTS: NORMAL

## 2019-04-04 ASSESSMENT — CUP TO DISC RATIO: OD_RATIO: 0.3

## 2019-04-04 NOTE — PATIENT INSTRUCTIONS
Monocular precautions discussion.  Patient will continue on Cosopt (Timolol/Dorzolamide) which is a blue top drop 2x/day (12 hours apart) in the RIGHT EYE ONLY and Alphagan (Brimonidine) which is a purple top drop 2x/day (12 hours apart) in the RIGHT EYE ONLY, and Travatan which is a teal top drop at bedtime in the RIGHT EYE ONLY.  A long discussion of the risks, benefits, and alternatives including potential treatment and management options were had with patient and a decision was made to proceed with micropulse CPC in the right eye.

## 2019-04-04 NOTE — NURSING NOTE
Chief Complaints and History of Present Illnesses   Patient presents with     Glaucoma Follow-Up     Chief Complaint(s) and History of Present Illness(es)     Glaucoma Follow-Up     Laterality: both eyes    Associated symptoms: floaters and photophobia.  Negative for eye pain and flashes    Compliance with Treatment: always    Pain scale: 0/10              Comments     Neovascular glaucoma of both eyes, severe stage  Pt reports no vision change   cosopt bid RE  alphagan bid RE  travatan every day RE  Edith Dziuk COA 10:16 AM April 4, 2019

## 2019-04-04 NOTE — PROGRESS NOTES
1)NVG OU -- s/p mCPC OD (4/2/18), s/p Tube OD (11/8/17), s/p TSCPC OS (4/25/16), s/p Tube OS x2 (8/15 and last in 11/15 by Dr. Harris), H/O tube revision x2 (last on 2/23/16), H/O tube retraction and conj retraction per old notes, etiology of vision loss -- K pachy: 618/--  Tmax: 60+ for both eyes HVF: OD:Sup edge points ?lid effect on first field  CDR: OD:0.3 and OS:No view HRT/OCT: OD:RNFL WNL   FHX of Glc: No  Gonio:Closed Intolerant to: Asthma/COPD: No, on po BB but smokes Steroid Use: Yes, was on po pred for RA in past  Kidney Stones: Mild CRI per patient (PCP is Dr. Carbajal at [facility] in [place]) Sulfa Allergy: No IOP targets: OD:L-M20s and OS:Comfort -- rec mCPC vs tube   2)DM c PDR -- s/p PRP OD (11/8/17), s/p Avastin OD -- following with Mandie Fitzgerald -- received IV Avastin OD in hospital   3)RA on Plaquenil, prednisone and Remicade -- following with Rheumat[facility]  4)PCIOL OU  5)NLP OS   6)H/O CAD s/p CABG per old notes  7)Ptosis OS -- pt may be interested in ptosis repair OS      Patient will discontinue and hold onto the Diamox 250mg pills.  DO NOT USE THESE PILLS.    Patient will also continue Genteal gel 4-6x/day in the left eye.       Monocular precautions discussion.  Patient will continue on Cosopt (Timolol/Dorzolamide) which is a blue top drop 2x/day (12 hours apart) in the RIGHT EYE ONLY and Alphagan (Brimonidine) which is a purple top drop 2x/day (12 hours apart) in the RIGHT EYE ONLY, and Travatan which is a teal top drop at bedtime in the RIGHT EYE ONLY.  A long discussion of the risks, benefits, and alternatives including potential treatment and management options were had with patient and a decision was made to proceed with micropulse CPC in the right eye.      Attending Physician Attestation:  Complete documentation of historical and exam elements from today's encounter can be found in the full encounter summary report (not reduplicated in this progress  note). I personally obtained the chief complaint(s) and history of present illness.  I confirmed and edited as necessary the review of systems, past medical/surgical history, family history, social history, and examination findings as documented by others; and I examined the patient myself. I personally reviewed the relevant tests, images, and reports as documented above. I formulated and edited as necessary the assessment and plan and discussed the findings and management plan with the patient and family.  - Bethany William MD

## 2019-04-16 ENCOUNTER — ANESTHESIA EVENT (OUTPATIENT)
Dept: SURGERY | Facility: AMBULATORY SURGERY CENTER | Age: 59
End: 2019-04-16

## 2019-04-17 ENCOUNTER — ANESTHESIA (OUTPATIENT)
Dept: SURGERY | Facility: AMBULATORY SURGERY CENTER | Age: 59
End: 2019-04-17

## 2019-04-17 ENCOUNTER — HOSPITAL ENCOUNTER (OUTPATIENT)
Facility: AMBULATORY SURGERY CENTER | Age: 59
End: 2019-04-17
Attending: OPHTHALMOLOGY
Payer: MEDICARE

## 2019-04-17 VITALS
TEMPERATURE: 97.1 F | SYSTOLIC BLOOD PRESSURE: 117 MMHG | WEIGHT: 186 LBS | DIASTOLIC BLOOD PRESSURE: 65 MMHG | BODY MASS INDEX: 30.99 KG/M2 | HEART RATE: 50 BPM | RESPIRATION RATE: 16 BRPM | HEIGHT: 65 IN | OXYGEN SATURATION: 100 %

## 2019-04-17 DIAGNOSIS — H40.53X3 NEOVASCULAR GLAUCOMA OF BOTH EYES, SEVERE STAGE: Primary | ICD-10-CM

## 2019-04-17 LAB
GLUCOSE BLDC GLUCOMTR-MCNC: 118 MG/DL (ref 70–99)
GLUCOSE BLDC GLUCOMTR-MCNC: 75 MG/DL (ref 70–99)
GLUCOSE BLDC GLUCOMTR-MCNC: 98 MG/DL (ref 70–99)

## 2019-04-17 RX ORDER — FENTANYL CITRATE 50 UG/ML
25-50 INJECTION, SOLUTION INTRAMUSCULAR; INTRAVENOUS
Status: DISCONTINUED | OUTPATIENT
Start: 2019-04-17 | End: 2019-04-17 | Stop reason: HOSPADM

## 2019-04-17 RX ORDER — ONDANSETRON 2 MG/ML
4 INJECTION INTRAMUSCULAR; INTRAVENOUS EVERY 30 MIN PRN
Status: DISCONTINUED | OUTPATIENT
Start: 2019-04-17 | End: 2019-04-18 | Stop reason: HOSPADM

## 2019-04-17 RX ORDER — MEPERIDINE HYDROCHLORIDE 25 MG/ML
12.5 INJECTION INTRAMUSCULAR; INTRAVENOUS; SUBCUTANEOUS
Status: DISCONTINUED | OUTPATIENT
Start: 2019-04-17 | End: 2019-04-18 | Stop reason: HOSPADM

## 2019-04-17 RX ORDER — OXYCODONE HYDROCHLORIDE 5 MG/1
5 TABLET ORAL EVERY 4 HOURS PRN
Status: DISCONTINUED | OUTPATIENT
Start: 2019-04-17 | End: 2019-04-18 | Stop reason: HOSPADM

## 2019-04-17 RX ORDER — ONDANSETRON 4 MG/1
4 TABLET, ORALLY DISINTEGRATING ORAL EVERY 30 MIN PRN
Status: DISCONTINUED | OUTPATIENT
Start: 2019-04-17 | End: 2019-04-18 | Stop reason: HOSPADM

## 2019-04-17 RX ORDER — SODIUM CHLORIDE, SODIUM LACTATE, POTASSIUM CHLORIDE, CALCIUM CHLORIDE 600; 310; 30; 20 MG/100ML; MG/100ML; MG/100ML; MG/100ML
500 INJECTION, SOLUTION INTRAVENOUS CONTINUOUS
Status: DISCONTINUED | OUTPATIENT
Start: 2019-04-17 | End: 2019-04-17 | Stop reason: HOSPADM

## 2019-04-17 RX ORDER — DEXTROSE MONOHYDRATE 25 G/50ML
25 INJECTION, SOLUTION INTRAVENOUS ONCE
Status: COMPLETED | OUTPATIENT
Start: 2019-04-17 | End: 2019-04-17

## 2019-04-17 RX ORDER — ACETAMINOPHEN 325 MG/1
975 TABLET ORAL ONCE
Status: COMPLETED | OUTPATIENT
Start: 2019-04-17 | End: 2019-04-17

## 2019-04-17 RX ORDER — NALOXONE HYDROCHLORIDE 0.4 MG/ML
.1-.4 INJECTION, SOLUTION INTRAMUSCULAR; INTRAVENOUS; SUBCUTANEOUS
Status: DISCONTINUED | OUTPATIENT
Start: 2019-04-17 | End: 2019-04-18 | Stop reason: HOSPADM

## 2019-04-17 RX ORDER — PREDNISOLONE ACETATE 10 MG/ML
1-2 SUSPENSION/ DROPS OPHTHALMIC 4 TIMES DAILY
Qty: 5 ML | Refills: 0 | Status: SHIPPED | OUTPATIENT
Start: 2019-04-17 | End: 2019-07-16

## 2019-04-17 RX ORDER — SODIUM CHLORIDE, SODIUM LACTATE, POTASSIUM CHLORIDE, CALCIUM CHLORIDE 600; 310; 30; 20 MG/100ML; MG/100ML; MG/100ML; MG/100ML
INJECTION, SOLUTION INTRAVENOUS CONTINUOUS
Status: DISCONTINUED | OUTPATIENT
Start: 2019-04-17 | End: 2019-04-18 | Stop reason: HOSPADM

## 2019-04-17 RX ORDER — PROPOFOL 10 MG/ML
INJECTION, EMULSION INTRAVENOUS PRN
Status: DISCONTINUED | OUTPATIENT
Start: 2019-04-17 | End: 2019-04-17

## 2019-04-17 RX ORDER — LIDOCAINE 40 MG/G
CREAM TOPICAL
Status: DISCONTINUED | OUTPATIENT
Start: 2019-04-17 | End: 2019-04-17 | Stop reason: HOSPADM

## 2019-04-17 RX ADMIN — PROPOFOL 15 MG: 10 INJECTION, EMULSION INTRAVENOUS at 09:12

## 2019-04-17 RX ADMIN — PROPOFOL 10 MG: 10 INJECTION, EMULSION INTRAVENOUS at 09:13

## 2019-04-17 RX ADMIN — ACETAMINOPHEN 975 MG: 325 TABLET ORAL at 08:20

## 2019-04-17 RX ADMIN — PROPOFOL 25 MG: 10 INJECTION, EMULSION INTRAVENOUS at 09:11

## 2019-04-17 RX ADMIN — SODIUM CHLORIDE, SODIUM LACTATE, POTASSIUM CHLORIDE, CALCIUM CHLORIDE 500 ML: 600; 310; 30; 20 INJECTION, SOLUTION INTRAVENOUS at 08:20

## 2019-04-17 RX ADMIN — DEXTROSE MONOHYDRATE 25 ML: 25 INJECTION, SOLUTION INTRAVENOUS at 08:20

## 2019-04-17 ASSESSMENT — MIFFLIN-ST. JEOR: SCORE: 1411.63

## 2019-04-17 NOTE — DISCHARGE INSTRUCTIONS
Discharge Instructions after Diode Coagulation or Micropulse (Laser Eye Surgery)  Dr. Bethany William & Dr. Mary Black      Take your post-operative drops according to the instructions      Leave eye patch until 11:30 today.    Take yellow and orange drops twice a day.    Start new drop today when patch removed      Approved activities (OK to do the following):    Please clean around the eye(s) gently with tissue or washcloth    Leaning over the sink to brush your teeth    Going up and down stairs    Walking for exercise    Watching TV or reading    After your clinic appointment tomorrow, you may shower, including putting your head under the shower, making sure to close your eyes    Call for any problems or questions [phone]    There is always someone on call, even on weekends.      [facility]  Home Care Following Anesthesia  For 24 hours after surgery:  1. Get plenty of rest.  A responsible adult must stay with you for at least 24 hours after you leave the surgery center.  2. Do not drive or use heavy equipment.  If you have weakness or tingling, don't drive or use heavy equipment until this feeling goes away.   3. Do not drink alcohol.   4. Avoid strenuous or risky activities.  Ask for help when climbing stairs.  5. You may feel lightheaded.  IF so, sit for a few minutes before standing.  Have someone help you get up.   6. If you have nausea (feel sick to your stomach): Drink only clear liquids such as apple juice, ginger ale, broth or 7-Up.  Rest may also help.  Be sure to drink enough fluids.  Move to a regular diet as you feel able.   7. You may have a slight fever.  Call the doctor if your fever is over 100 F (37.7 C) (taken under the tongue) or lasts longer than 24 hours.  8. You may have a dry mouth, a sore throat, muscle aches or trouble sleeping. These should go away after 24 hours.  9. Do not make important or legal decisions.        Today you received a  "Marcaine or bupivacaine block to numb the nerves near your surgery site.  This is a block using local anesthetic or \"numbing\" medication injected around the nerves to anesthetize or \"numb\" the area supplied by those nerves.  This block is injected into the muscle layer near your surgical site.  The medication may numb the location where you had surgery for 6-18 hours, but may last up to 24 hours.  If your surgical site is an arm or leg you should be careful with your affected limb, since it is possible to injure your limb without being aware of it due to the numbing.  Until full feeling returns, you should guard against bumping or hitting your limb, and avoid extreme hot or cold temperatures on the skin.  As the block wears off, the feeling will return as a tingling or prickly sensation near your surgical site.  You will experience more discomfort from your incision as the feeling returns.  You may want to take a pain pill (a narcotic or Tylenol if this was prescribed by your surgeon) when you start to experience mild pain before the pain beccomes more severe.  If your pain medications do not control your pain you should notifiy your surgeon.    Tips for taking pain medications  To get the best pain relief possible, remember these points:    Take pain medications as directed, before pain becomes severe.    Pain medication can upset your stomach: taking it with food may help.    Constipation is a common side effect of pain medication. Drink plenty of  fluids.    Eat foods high in fiber. Take a stool softener if recommended by your doctor or pharmacist.    Do not drink alcohol, drive or operate machinery while taking pain medications.    Ask about other ways to control pain, such as with heat, ice or relaxation.    Tylenol/Acetaminophen Consumption  To help encourage the safe use of acetaminophen, the makers of TYLENOL  have lowered the maximum daily dose for single-ingredient Extra Strength TYLENOL  (acetaminophen) " products sold in the [place] from 8 pills per day (4,000 mg) to 6 pills per day (3,000 mg). The dosing interval has also changed from 2 pills every 4-6 hours to 2 pills every 6 hours.    If you feel your pain relief is insufficient, you may take Tylenol/Acetaminophen in addition to your narcotic pain medication.     Be careful not to exceed 3,000 mg of Tylenol/Acetaminophen in a 24 hour period from all sources.    If you are taking extra strength Tylenol/acetaminophen (500 mg), the maximum dose is 6 tablets in 24 hours.    If you are taking regular strength acetaminophen (325 mg), the maximum dose is 9 tablets in 24 hours.    Call a doctor for any of the followin. Signs of infection (fever, growing tenderness at the surgery site, a large amount of drainage or bleeding, severe pain, foul-smelling drainage, redness, swelling).  2. It has been over 8 to 10 hours since surgery and you are still not able to urinate (pass water).  3. Headache for over 24 hours.  4. Numbness, tingling or weakness the day after surgery (if you had spinal anesthesia).  Your doctor is:  Dr. Bethany William, Opthalmology: [phone]  Or dial [phone] and ask for the resident on call for:  Ophthalmology  For emergency care, call the:  [facility]:  [phone] (TTY for hearing impaired: [phone])

## 2019-04-17 NOTE — ANESTHESIA PREPROCEDURE EVALUATION
Anesthesia Pre-Procedure Evaluation    Patient: Connie Longoria   MRN:     [id number] Gender:   female   Age:    58 year old :      1960        Preoperative Diagnosis: Glaucoma   Procedure(s):  Right Eye Micropulse Cyclophotocoagulation     Past Medical History:   Diagnosis Date     Congestive heart failure, unspecified      Coronary artery disease      CVA (cerebral vascular accident) (H)      Diabetes (H)      Gastro-oesophageal reflux disease      Glaucoma      Heart attack (H)      Hyperlipidemia      Hypertension      Renal disease     from diabetes and steroid use     Rheumatoid arthritis (H)      Stented coronary artery      Steroid long-term use      Stroke (cerebrum) (H)      Unspecified cerebral artery occlusion with cerebral infarction       Past Surgical History:   Procedure Laterality Date     AS CABG, ARTERY-VEIN, FOUR       CARDIAC SURGERY       COLONOSCOPY       EYE SURGERY Left      EYE SURGERY Right 2017    shunt placement     IMPLANT VALVE EYE Left 2015    Procedure: IMPLANT VALVE EYE;  Surgeon: Harlan Harris MD;  Location: [facility]     IMPLANT VALVE EYE Left 11/3/2015    Procedure: IMPLANT VALVE EYE;  Surgeon: aHrlan Harris MD;  Location: [facility]     REVISE GLAUCOMA SHUNT Left 10/9/2015    Procedure: REVISE GLAUCOMA SHUNT;  Surgeon: Harlan Harris MD;  Location: [facility]     REVISE GLAUCOMA SHUNT Left 2016    Procedure: REVISE GLAUCOMA SHUNT;  Surgeon: Harlan Harris MD;  Location: [facility]     VASCULAR SURGERY      carodid endarectomy          Anesthesia Evaluation     . Pt has had prior anesthetic. Type: General    No history of anesthetic complications          ROS/MED HX    ENT/Pulmonary:  - neg pulmonary ROS     Neurologic:     (+)CVA with deficits    Cardiovascular:     (+) hypertension--CAD, -CABG-. : . CHF . . :. .       METS/Exercise Tolerance:  3 - Able to walk 1-2 blocks without stopping   Hematologic:  - neg hematologic  ROS       Musculoskeletal:  - neg  "musculoskeletal ROS       GI/Hepatic:     (+) GERD Asymptomatic on medication,       Renal/Genitourinary:     (+) chronic renal disease, type: CRI,       Endo:     (+) type II DM Chronic steroid usage for .      Psychiatric:         Infectious Disease:         Malignancy:         Other:                         PHYSICAL EXAM:   Mental Status/Neuro: A/A/O   Airway: Facies: Feasible  Mallampati: I  Mouth/Opening: Full  TM distance: > 6 cm  Neck ROM: Full   Respiratory: Auscultation: CTAB     Resp. Rate: Normal     Resp. Effort: Normal      CV: Rhythm: Regular  Rate: Age appropriate  Heart: Normal Sounds   Comments:      Dental:  Dental Comments: missing                Lab Results   Component Value Date    WBC 5.2 11/04/2017    HGB 12.0 11/04/2017    HCT 37.3 11/04/2017     (L) 11/05/2017    CR 1.07 (H) 11/05/2017    INR 1.11 11/04/2017       Preop Vitals  BP Readings from Last 3 Encounters:   04/17/19 99/61   11/05/17 103/61   02/23/16 128/62    Pulse Readings from Last 3 Encounters:   04/17/19 50   11/05/17 55      Resp Readings from Last 3 Encounters:   04/17/19 14   11/05/17 16   02/23/16 18    SpO2 Readings from Last 3 Encounters:   04/17/19 98%   11/05/17 100%   02/23/16 99%      Temp Readings from Last 1 Encounters:   04/17/19 36.4  C (97.6  F) (Oral)    Ht Readings from Last 1 Encounters:   04/17/19 1.638 m (5' 4.5\")      Wt Readings from Last 1 Encounters:   04/17/19 84.4 kg (186 lb)    Estimated body mass index is 31.43 kg/m  as calculated from the following:    Height as of this encounter: 1.638 m (5' 4.5\").    Weight as of this encounter: 84.4 kg (186 lb).     LDA:            Assessment:   ASA SCORE: 3       Documentation: H&P complete; Preop Testing complete; Consents complete   Proceeding: Proceed without further delay  Tobacco Use:  NO Active use of Tobacco/UNKNOWN Tobacco use status     Plan:   Anes. Type:  MAC      Induction:  IV (Standard)   Airway: Native Airway   Access/Monitoring: PIV "   Maintenance: Propofol; IV   Emergence: Procedure Site   Logistics: Same Day Surgery     Postop Pain/Sedation Strategy:  Standard-Options: Opioids PRN     PONV Management:  Adult Risk Factors: Female, Non-Smoker, Postop Opioids  Prevention: Propofol Infusion; Ondansetron; Dexamethasone     CONSENT: Direct conversation   Plan and risks discussed with: Patient          Comments for Plan/Consent:  1/2 amp D50 preop for Blood sugar of 75.  Re check in 30 minutes                         Doug Rush MD, MD

## 2019-04-17 NOTE — OP NOTE
Pre-operative diagnosis: Right Eye Glaucoma   Post-operative diagnosis Same   Procedure:    Anesthesia: Procedure(s):  Micropulse Cyclophotocoagulation, Right  Retrobulbar Block   Surgeon: Bethany William MD   Assistants(s): None   Estimated blood loss: Minimal                         Specimens: None   Findings:  Complications: None  None       Indication: Patient was noted to have glaucoma with uncontrolled intraocular pressures despite maximally tolerated medical therapy.     After informed consent was obtained, the patient was wheeled to the operative suite. Preoperatively the patient received a retrobulbar block consisting of 2% Lidocaine under propofol anesthesia. Patient was then prepped and draped in the usual fashion. A lid speculum was placed between the upper and lower eyelids. Micropulse trans-scleral cyclophotocoagulation was performed using a diode laser using 2000mJ and a duration of 90 seconds per hemifield sparing the 9 and 3 o'clock locations. The patient then had Maxitrol ophthalmic ointment applied to the ocular surface of the operative eye as well as a pressure patch. The patient was wheeled to the recovery area in stable condition.

## 2019-04-17 NOTE — ANESTHESIA POSTPROCEDURE EVALUATION
Anesthesia POST Procedure Evaluation    Patient: Connie Longoria   MRN:     [id number] Gender:   female   Age:    58 year old :      1960        Preoperative Diagnosis: Glaucoma   Procedure(s):  Right Eye Micropulse Cyclophotocoagulation   Postop Comments: No value filed.       Anesthesia Type:  MAC    Reportable Event: NO     PAIN: Uncomplicated   Sign Out status: Comfortable, Well controlled pain     PONV: No PONV   Sign Out status:  No Nausea or Vomiting     Neuro/Psych: Uneventful perioperative course   Sign Out Status: Preoperative baseline; Age appropriate mentation     Airway/Resp.: Uneventful perioperative course   Sign Out Status: Non labored breathing, age appropriate RR; Resp. Status within EXPECTED Parameters     CV: Uneventful perioperative course   Sign Out status: Appropriate BP and perfusion indices; Appropriate HR/Rhythm     Disposition:   Sign Out in:  PACU  Disposition:  Phase II; Home  Recovery Course: Uneventful  Follow-Up: Not required           Last Anesthesia Record Vitals:  CRNA VITALS  2019 0853 - 2019 0953      2019             Pulse:  48  (Abnormal)     Ht Rate:  52    SpO2:  99 %    Resp Rate (set):  10          Last PACU Vitals:  Vitals Value Taken Time   /69 2019  9:28 AM   Temp 36.3  C (97.3  F) 2019  9:28 AM   Pulse 49 2019  9:28 AM   Resp 14 2019  9:28 AM   SpO2 99 % 2019  9:28 AM   Temp src Skin 2019  9:17 AM   NIBP 128/75 2019  9:21 AM   Pulse 48 2019  9:23 AM   SpO2 99 % 2019  9:23 AM   Resp     Temp     Ht Rate 52 2019  9:23 AM   Temp 2           Electronically Signed By: Doug Rush MD, MD, 2019, 11:14 AM

## 2019-05-09 ENCOUNTER — OFFICE VISIT (OUTPATIENT)
Dept: OPHTHALMOLOGY | Facility: CLINIC | Age: 59
End: 2019-05-09
Attending: OPHTHALMOLOGY
Payer: MEDICARE

## 2019-05-09 DIAGNOSIS — H40.53X3 NEOVASCULAR GLAUCOMA OF BOTH EYES, SEVERE STAGE: Primary | ICD-10-CM

## 2019-05-09 DIAGNOSIS — Z96.1 PSEUDOPHAKIA OF BOTH EYES: ICD-10-CM

## 2019-05-09 PROCEDURE — G0463 HOSPITAL OUTPT CLINIC VISIT: HCPCS | Mod: ZF

## 2019-05-09 ASSESSMENT — EXTERNAL EXAM - RIGHT EYE: OD_EXAM: NORMAL

## 2019-05-09 ASSESSMENT — REFRACTION_WEARINGRX
OS_SPHERE: +2.50
OD_SPHERE: +2.50
SPECS_TYPE: SVL
OS_CYLINDER: SPHERE
OS_SPHERE: +2.00
OD_SPHERE: +2.00
OD_SPHERE: PLANO
OS_SPHERE: PLANO
OD_CYLINDER: SPHERE

## 2019-05-09 ASSESSMENT — VISUAL ACUITY
OD_SC: 20/40
METHOD: SNELLEN - LINEAR
OS_SC: NLP

## 2019-05-09 ASSESSMENT — CONF VISUAL FIELD
OS_SUPERIOR_TEMPORAL_RESTRICTION: 1
OD_NORMAL: 1
OS_SUPERIOR_NASAL_RESTRICTION: 1
OS_INFERIOR_TEMPORAL_RESTRICTION: 1
OS_INFERIOR_NASAL_RESTRICTION: 1
METHOD: COUNTING FINGERS

## 2019-05-09 ASSESSMENT — TONOMETRY
OD_IOP_MMHG: 27
OD_IOP_MMHG: 22
IOP_METHOD: APPLANATION
IOP_METHOD: APPLANATION
OS_IOP_MMHG: 34

## 2019-05-09 ASSESSMENT — SLIT LAMP EXAM - LIDS: COMMENTS: NORMAL

## 2019-05-09 ASSESSMENT — EXTERNAL EXAM - LEFT EYE: OS_EXAM: NORMAL

## 2019-05-09 NOTE — PROGRESS NOTES
1)NVG OU -- s/p mCPC OD (4/17/19), s/p mCPC OD (4/2/18), s/p Tube OD (11/8/17), s/p TSCPC OS (4/25/16), s/p Tube OS x2 (8/15 and last in 11/15 by Dr. Harris), H/O tube revision x2 (last on 2/23/16), H/O tube retraction and conj retraction per old notes, etiology of vision loss -- K pachy: 618/--  Tmax: 60+ for both eyes HVF: OD:Sup edge points ?lid effect on first field  CDR: OD:0.3 and OS:No view HRT/OCT: OD:RNFL WNL   FHX of Glc: No  Gonio:Closed Intolerant to: Asthma/COPD: No, on po BB but smokes Steroid Use: Yes, was on po pred for RA in past  Kidney Stones: Mild CRI per patient (PCP is Dr. Carbajal at [facility] in [place]) Sulfa Allergy: No IOP targets: OD:L-M20s and OS:Comfort -- rec mCPC vs tube   2)DM c PDR -- s/p PRP OD (11/8/17), s/p Avastin OD -- following with Dr. Lockwood   3)RA on Plaquenil, prednisone and Remicade -- following with Rheumatologist [facility]  4)PCIOL OU  5)NLP OS   6)H/O CAD s/p CABG per old notes  7)Ptosis OS -- pt may be interested in ptosis repair OS      Patient will discontinue and hold onto the Diamox 250mg pills.  DO NOT USE THESE PILLS.    Patient will also continue Genteal gel 4-6x/day in the left eye.       Monocular precautions discussion.  Patient will continue on Cosopt (Timolol/Dorzolamide) which is a blue top drop 2x/day (12 hours apart) in the RIGHT EYE ONLY and Alphagan (Brimonidine) which is a purple top drop 2x/day (12 hours apart) in the RIGHT EYE ONLY, and Travatan which is a teal top drop at bedtime in the RIGHT EYE ONLY.  Patient will return to clinic in 2-3 months with visual field test, undilated fundus exam and IOP check.          Resident note (educational purposes, please refer to text above for final A&P):  2 week follow-up s/p cpc right eye  Vision stable, glare around lights, comfortable  Had significant side effects with Diamox in the past  Continue current drops  Monocular precautions  Follow-up in 1 month pressure check, visual   in 5 months     Xander Lynn M.D.  PGY-3, Ophthalmology     Attending Physician Attestation:  Complete documentation of historical and exam elements from today's encounter can be found in the full encounter summary report (not reduplicated in this progress note). I personally obtained the chief complaint(s) and history of present illness.  I confirmed and edited as necessary the review of systems, past medical/surgical history, family history, social history, and examination findings as documented by others; and I examined the patient myself. I personally reviewed the relevant tests, images, and reports as documented above. I formulated and edited as necessary the assessment and plan and discussed the findings and management plan with the patient and family.  - Bethany William MD

## 2019-05-09 NOTE — PATIENT INSTRUCTIONS
Monocular precautions discussion.  Patient will continue on Cosopt (Timolol/Dorzolamide) which is a blue top drop 2x/day (12 hours apart) in the RIGHT EYE ONLY and Alphagan (Brimonidine) which is a purple top drop 2x/day (12 hours apart) in the RIGHT EYE ONLY, and Travatan which is a teal top drop at bedtime in the RIGHT EYE ONLY.  Patient will return to clinic in 2-3 months with visual field test, undilated fundus exam and IOP check.

## 2019-05-09 NOTE — NURSING NOTE
Chief Complaints and History of Present Illnesses   Patient presents with     Follow Up     2 week follow up s/p [facility] Right Eye     Chief Complaint(s) and History of Present Illness(es)     Follow Up     Comments: 2 week follow up s/p [facility] Right Eye              Comments     Pt states vision is about the same as last visit. Pt seeing more glare around lights. No eye pain today.  Tearing from RE that comes and goes.  DM1 BS:Pt doesn't check sugars daily.  A1C: 6.4 Taken about 3 months ago per pt.  No results found for: A1C    Darrell MELGAR May 9, 2019 12:10 PM

## 2019-05-20 DIAGNOSIS — H40.53X3 NEOVASCULAR GLAUCOMA OF BOTH EYES, SEVERE STAGE: ICD-10-CM

## 2019-05-21 RX ORDER — TRAVOPROST 0.04 MG/ML
1 SOLUTION/ DROPS OPHTHALMIC AT BEDTIME
Qty: 2.5 ML | Refills: 11 | Status: SHIPPED | OUTPATIENT
Start: 2019-05-21 | End: 2020-06-04

## 2019-05-21 NOTE — TELEPHONE ENCOUNTER
Last Clinic Visit: [facility]  Last clinic note: Dr. Nataliia Perez which is a teal top drop at bedtime in the RIGHT EYE ONLY.

## 2019-07-16 ENCOUNTER — OFFICE VISIT (OUTPATIENT)
Dept: OPHTHALMOLOGY | Facility: CLINIC | Age: 59
End: 2019-07-16
Attending: OPHTHALMOLOGY
Payer: MEDICARE

## 2019-07-16 DIAGNOSIS — H40.53X3 NEOVASCULAR GLAUCOMA OF BOTH EYES, SEVERE STAGE: Primary | ICD-10-CM

## 2019-07-16 DIAGNOSIS — Z96.1 PSEUDOPHAKIA OF BOTH EYES: ICD-10-CM

## 2019-07-16 PROCEDURE — G0463 HOSPITAL OUTPT CLINIC VISIT: HCPCS | Mod: ZF

## 2019-07-16 PROCEDURE — 92015 DETERMINE REFRACTIVE STATE: CPT | Mod: GY,ZF

## 2019-07-16 PROCEDURE — 92083 EXTENDED VISUAL FIELD XM: CPT | Mod: ZF | Performed by: OPHTHALMOLOGY

## 2019-07-16 ASSESSMENT — REFRACTION_WEARINGRX
OD_SPHERE: PLANO
OD_CYLINDER: SPHERE
OS_CYLINDER: SPHERE
OD_SPHERE: +2.50
SPECS_TYPE: SVL
OS_SPHERE: +2.00
OS_SPHERE: PLANO
OD_SPHERE: +2.00
OS_SPHERE: +2.50

## 2019-07-16 ASSESSMENT — VISUAL ACUITY
OD_SC: 20/30
OD_PH_SC: 20/25
OS_SC: NLP
METHOD: SNELLEN - LINEAR
OD_SC+: -3

## 2019-07-16 ASSESSMENT — REFRACTION_MANIFEST
OD_AXIS: 150
OS_SPHERE: BALANCE
OD_CYLINDER: +1.00
OD_SPHERE: -1.00
OS_SPHERE: BALANCE
OD_AXIS: 150
OD_ADD: +2.25
OD_ADD: +2.50
OD_SPHERE: -1.00
OD_CYLINDER: +1.00

## 2019-07-16 ASSESSMENT — CONF VISUAL FIELD
OS_SUPERIOR_NASAL_RESTRICTION: 1
METHOD: COUNTING FINGERS
OS_INFERIOR_TEMPORAL_RESTRICTION: 1
OD_NORMAL: 1
OS_INFERIOR_NASAL_RESTRICTION: 1
OS_SUPERIOR_TEMPORAL_RESTRICTION: 1

## 2019-07-16 ASSESSMENT — TONOMETRY
OS_IOP_MMHG: 24
OD_IOP_MMHG: 23
IOP_METHOD: APPLANATION
OD_IOP_MMHG: 22

## 2019-07-16 ASSESSMENT — SLIT LAMP EXAM - LIDS: COMMENTS: NORMAL

## 2019-07-16 ASSESSMENT — EXTERNAL EXAM - RIGHT EYE: OD_EXAM: NORMAL

## 2019-07-16 ASSESSMENT — EXTERNAL EXAM - LEFT EYE: OS_EXAM: NORMAL

## 2019-07-16 ASSESSMENT — CUP TO DISC RATIO: OD_RATIO: 0.3

## 2019-07-16 NOTE — PROGRESS NOTES
1)NVG OU -- s/p mCPC OD (4/17/19), s/p mCPC OD (4/2/18), s/p Tube OD (11/8/17), s/p TSCPC OS (4/25/16), s/p Tube OS x2 (8/15 and last in 11/15 by Dr. Harris), H/O tube revision x2 (last on 2/23/16), H/O tube retraction and conj retraction per old notes, etiology of vision loss -- K pachy: 618/--  Tmax: 60+ for both eyes HVF: OD:Sup edge points ?lid effect on first field  CDR: OD:0.3 and OS:No view HRT/OCT: OD:RNFL WNL   FHX of Glc: No  Gonio:Closed Intolerant to: Asthma/COPD: No, on po BB but smokes Steroid Use: Yes, was on po pred for RA in past  Kidney Stones: Mild CRI per patient (PCP is Dr. Carbajal at [facility] in [place]) Sulfa Allergy: No IOP targets: OD:L-M20s and OS:Comfort -- rec mCPC vs tube   2)DM c PDR -- s/p PRP OD (11/8/17), s/p Avastin OD -- following with Dr. Lockwood   3)RA on Plaquenil, prednisone and Remicade -- following with Rheumatologist [facility]  4)PCIOL OU  5)NLP OS   6)H/O CAD s/p CABG per old notes  7)Ptosis OS -- pt may be interested in ptosis repair OS      Patient will discontinue and hold onto the Diamox 250mg pills.  DO NOT USE THESE PILLS.    Patient will also continue Genteal gel 4-6x/day in the left eye.       Monocular precautions discussion.  Patient will continue on Cosopt (Timolol/Dorzolamide) which is a blue top drop 2x/day (12 hours apart) in the RIGHT EYE ONLY and Alphagan (Brimonidine) which is a purple top drop 2x/day (12 hours apart) in the RIGHT EYE ONLY, and Travatan which is a teal top drop at bedtime in the RIGHT EYE ONLY.  Patient will return to clinic in 4-6 months with visual field test, dilated fundus exam and IOP check.          Attending Physician Attestation:  Complete documentation of historical and exam elements from today's encounter can be found in the full encounter summary report (not reduplicated in this progress note). I personally obtained the chief complaint(s) and history of present illness.  I confirmed and edited as  necessary the review of systems, past medical/surgical history, family history, social history, and examination findings as documented by others; and I examined the patient myself. I personally reviewed the relevant tests, images, and reports as documented above. I formulated and edited as necessary the assessment and plan and discussed the findings and management plan with the patient and family.  - Bethany William MD

## 2019-07-16 NOTE — PATIENT INSTRUCTIONS
Monocular precautions discussion.  Patient will continue on Cosopt (Timolol/Dorzolamide) which is a blue top drop 2x/day (12 hours apart) in the RIGHT EYE ONLY and Alphagan (Brimonidine) which is a purple top drop 2x/day (12 hours apart) in the RIGHT EYE ONLY, and Travatan which is a teal top drop at bedtime in the RIGHT EYE ONLY.  Patient will return to clinic in 4-6 months with visual field test, dilated fundus exam and IOP check.

## 2019-07-16 NOTE — NURSING NOTE
Chief Complaints and History of Present Illnesses   Patient presents with     Glaucoma Follow-Up     Chief Complaint(s) and History of Present Illness(es)     Glaucoma Follow-Up               Comments     Glaucoma follow up.    The patient states she is doing well.  She does notice dry eyes with the air conditioned air.  She uses the Genteal and artificial tears.  The patient uses the Cosopt twice daily, the Brimonidine twice daily and the Travatan at night in the right eye.  MARIA ESTHER Crooks 9:01 AM 07/16/2019

## 2019-08-06 ENCOUNTER — TRANSFERRED RECORDS (OUTPATIENT)
Dept: HEALTH INFORMATION MANAGEMENT | Facility: CLINIC | Age: 59
End: 2019-08-06

## 2019-10-24 ENCOUNTER — APPOINTMENT (OUTPATIENT)
Dept: CT IMAGING | Facility: CLINIC | Age: 59
End: 2019-10-24
Attending: EMERGENCY MEDICINE
Payer: MEDICARE

## 2019-10-24 ENCOUNTER — HOSPITAL ENCOUNTER (EMERGENCY)
Facility: CLINIC | Age: 59
Discharge: HOME | End: 2019-10-24
Attending: EMERGENCY MEDICINE | Admitting: EMERGENCY MEDICINE
Payer: MEDICARE

## 2019-10-24 ENCOUNTER — OFFICE VISIT (OUTPATIENT)
Dept: URGENT CARE | Facility: URGENT CARE | Age: 59
End: 2019-10-24
Payer: MEDICARE

## 2019-10-24 VITALS
OXYGEN SATURATION: 98 % | TEMPERATURE: 98.3 F | BODY MASS INDEX: 29.41 KG/M2 | HEART RATE: 105 BPM | DIASTOLIC BLOOD PRESSURE: 102 MMHG | RESPIRATION RATE: 16 BRPM | WEIGHT: 174 LBS | SYSTOLIC BLOOD PRESSURE: 181 MMHG

## 2019-10-24 VITALS
WEIGHT: 178 LBS | RESPIRATION RATE: 16 BRPM | SYSTOLIC BLOOD PRESSURE: 166 MMHG | HEART RATE: 98 BPM | DIASTOLIC BLOOD PRESSURE: 85 MMHG | BODY MASS INDEX: 30.39 KG/M2 | HEIGHT: 64 IN | TEMPERATURE: 98.1 F | OXYGEN SATURATION: 98 %

## 2019-10-24 DIAGNOSIS — E11.69 TYPE 2 DIABETES MELLITUS WITH OTHER SPECIFIED COMPLICATION, WITH LONG-TERM CURRENT USE OF INSULIN (H): ICD-10-CM

## 2019-10-24 DIAGNOSIS — E86.0 DEHYDRATION: ICD-10-CM

## 2019-10-24 DIAGNOSIS — R10.32 LLQ ABDOMINAL PAIN: ICD-10-CM

## 2019-10-24 DIAGNOSIS — R10.32 LEFT LOWER QUADRANT PAIN: ICD-10-CM

## 2019-10-24 DIAGNOSIS — K52.9 GASTROENTERITIS: ICD-10-CM

## 2019-10-24 DIAGNOSIS — Z79.4 TYPE 2 DIABETES MELLITUS WITH OTHER SPECIFIED COMPLICATION, WITH LONG-TERM CURRENT USE OF INSULIN (H): ICD-10-CM

## 2019-10-24 DIAGNOSIS — R11.2 NON-INTRACTABLE VOMITING WITH NAUSEA, UNSPECIFIED VOMITING TYPE: ICD-10-CM

## 2019-10-24 DIAGNOSIS — R11.2 NAUSEA AND VOMITING, INTRACTABILITY OF VOMITING NOT SPECIFIED, UNSPECIFIED VOMITING TYPE: Primary | ICD-10-CM

## 2019-10-24 LAB
ALBUMIN SERPL-MCNC: 3.8 G/DL (ref 3.4–5)
ALBUMIN SERPL-MCNC: 4 G/DL (ref 3.4–5)
ALP SERPL-CCNC: 65 U/L (ref 40–150)
ALP SERPL-CCNC: 66 U/L (ref 40–150)
ALT SERPL W P-5'-P-CCNC: 20 U/L (ref 0–50)
ALT SERPL W P-5'-P-CCNC: 22 U/L (ref 0–50)
ANION GAP SERPL CALCULATED.3IONS-SCNC: 9 MMOL/L (ref 3–14)
ANION GAP SERPL CALCULATED.3IONS-SCNC: 9 MMOL/L (ref 3–14)
AST SERPL W P-5'-P-CCNC: 21 U/L (ref 0–45)
AST SERPL W P-5'-P-CCNC: 32 U/L (ref 0–45)
BASOPHILS # BLD AUTO: 0 10E9/L (ref 0–0.2)
BASOPHILS # BLD AUTO: 0 10E9/L (ref 0–0.2)
BASOPHILS NFR BLD AUTO: 0.2 %
BASOPHILS NFR BLD AUTO: 0.2 %
BILIRUB SERPL-MCNC: 0.6 MG/DL (ref 0.2–1.3)
BILIRUB SERPL-MCNC: 0.7 MG/DL (ref 0.2–1.3)
BUN SERPL-MCNC: 24 MG/DL (ref 7–30)
BUN SERPL-MCNC: 26 MG/DL (ref 7–30)
CALCIUM SERPL-MCNC: 10.1 MG/DL (ref 8.5–10.1)
CALCIUM SERPL-MCNC: 9.8 MG/DL (ref 8.5–10.1)
CHLORIDE SERPL-SCNC: 101 MMOL/L (ref 94–109)
CHLORIDE SERPL-SCNC: 102 MMOL/L (ref 94–109)
CO2 SERPL-SCNC: 23 MMOL/L (ref 20–32)
CO2 SERPL-SCNC: 24 MMOL/L (ref 20–32)
CREAT BLD-MCNC: 1 MG/DL (ref 0.52–1.04)
CREAT SERPL-MCNC: 0.94 MG/DL (ref 0.52–1.04)
CREAT SERPL-MCNC: 0.97 MG/DL (ref 0.52–1.04)
DIFFERENTIAL METHOD BLD: ABNORMAL
DIFFERENTIAL METHOD BLD: ABNORMAL
EOSINOPHIL # BLD AUTO: 0 10E9/L (ref 0–0.7)
EOSINOPHIL # BLD AUTO: 0 10E9/L (ref 0–0.7)
EOSINOPHIL NFR BLD AUTO: 0.1 %
EOSINOPHIL NFR BLD AUTO: 0.1 %
ERYTHROCYTE [DISTWIDTH] IN BLOOD BY AUTOMATED COUNT: 12.4 % (ref 10–15)
ERYTHROCYTE [DISTWIDTH] IN BLOOD BY AUTOMATED COUNT: 12.7 % (ref 10–15)
GFR SERPL CREATININE-BSD FRML MDRD: 57 ML/MIN/{1.73_M2}
GFR SERPL CREATININE-BSD FRML MDRD: 63 ML/MIN/{1.73_M2}
GFR SERPL CREATININE-BSD FRML MDRD: 66 ML/MIN/{1.73_M2}
GLUCOSE SERPL-MCNC: 242 MG/DL (ref 70–99)
GLUCOSE SERPL-MCNC: 267 MG/DL (ref 70–99)
HCT VFR BLD AUTO: 45.9 % (ref 35–47)
HCT VFR BLD AUTO: 46.6 % (ref 35–47)
HGB BLD-MCNC: 15.9 G/DL (ref 11.7–15.7)
HGB BLD-MCNC: 16.3 G/DL (ref 11.7–15.7)
IMM GRANULOCYTES # BLD: 0 10E9/L (ref 0–0.4)
IMM GRANULOCYTES NFR BLD: 0.4 %
LIPASE SERPL-CCNC: 59 U/L (ref 73–393)
LYMPHOCYTES # BLD AUTO: 2.4 10E9/L (ref 0.8–5.3)
LYMPHOCYTES # BLD AUTO: 2.7 10E9/L (ref 0.8–5.3)
LYMPHOCYTES NFR BLD AUTO: 22.9 %
LYMPHOCYTES NFR BLD AUTO: 24.7 %
MCH RBC QN AUTO: 33.5 PG (ref 26.5–33)
MCH RBC QN AUTO: 33.7 PG (ref 26.5–33)
MCHC RBC AUTO-ENTMCNC: 34.6 G/DL (ref 31.5–36.5)
MCHC RBC AUTO-ENTMCNC: 35 G/DL (ref 31.5–36.5)
MCV RBC AUTO: 96 FL (ref 78–100)
MCV RBC AUTO: 97 FL (ref 78–100)
MONOCYTES # BLD AUTO: 0.5 10E9/L (ref 0–1.3)
MONOCYTES # BLD AUTO: 0.7 10E9/L (ref 0–1.3)
MONOCYTES NFR BLD AUTO: 4.9 %
MONOCYTES NFR BLD AUTO: 6.1 %
NEUTROPHILS # BLD AUTO: 7.5 10E9/L (ref 1.6–8.3)
NEUTROPHILS # BLD AUTO: 7.6 10E9/L (ref 1.6–8.3)
NEUTROPHILS NFR BLD AUTO: 68.5 %
NEUTROPHILS NFR BLD AUTO: 71.9 %
NRBC # BLD AUTO: 0 10*3/UL
NRBC BLD AUTO-RTO: 0 /100
PLATELET # BLD AUTO: 169 10E9/L (ref 150–450)
PLATELET # BLD AUTO: 173 10E9/L (ref 150–450)
POTASSIUM SERPL-SCNC: 4.1 MMOL/L (ref 3.4–5.3)
POTASSIUM SERPL-SCNC: 4.2 MMOL/L (ref 3.4–5.3)
PROT SERPL-MCNC: 9.1 G/DL (ref 6.8–8.8)
PROT SERPL-MCNC: 9.1 G/DL (ref 6.8–8.8)
RBC # BLD AUTO: 4.75 10E12/L (ref 3.8–5.2)
RBC # BLD AUTO: 4.84 10E12/L (ref 3.8–5.2)
SODIUM SERPL-SCNC: 134 MMOL/L (ref 133–144)
SODIUM SERPL-SCNC: 134 MMOL/L (ref 133–144)
WBC # BLD AUTO: 10.6 10E9/L (ref 4–11)
WBC # BLD AUTO: 11 10E9/L (ref 4–11)

## 2019-10-24 PROCEDURE — 82565 ASSAY OF CREATININE: CPT

## 2019-10-24 PROCEDURE — 36415 COLL VENOUS BLD VENIPUNCTURE: CPT | Performed by: FAMILY MEDICINE

## 2019-10-24 PROCEDURE — 99285 EMERGENCY DEPT VISIT HI MDM: CPT | Mod: 25

## 2019-10-24 PROCEDURE — 25000125 ZZHC RX 250: Performed by: EMERGENCY MEDICINE

## 2019-10-24 PROCEDURE — 80053 COMPREHEN METABOLIC PANEL: CPT | Performed by: FAMILY MEDICINE

## 2019-10-24 PROCEDURE — 25000128 H RX IP 250 OP 636: Performed by: EMERGENCY MEDICINE

## 2019-10-24 PROCEDURE — 85025 COMPLETE CBC W/AUTO DIFF WBC: CPT | Performed by: EMERGENCY MEDICINE

## 2019-10-24 PROCEDURE — 80053 COMPREHEN METABOLIC PANEL: CPT | Performed by: EMERGENCY MEDICINE

## 2019-10-24 PROCEDURE — 96374 THER/PROPH/DIAG INJ IV PUSH: CPT | Mod: 59

## 2019-10-24 PROCEDURE — 83690 ASSAY OF LIPASE: CPT | Performed by: EMERGENCY MEDICINE

## 2019-10-24 PROCEDURE — 85025 COMPLETE CBC W/AUTO DIFF WBC: CPT | Performed by: FAMILY MEDICINE

## 2019-10-24 PROCEDURE — 99205 OFFICE O/P NEW HI 60 MIN: CPT | Performed by: FAMILY MEDICINE

## 2019-10-24 PROCEDURE — 74177 CT ABD & PELVIS W/CONTRAST: CPT

## 2019-10-24 PROCEDURE — 96361 HYDRATE IV INFUSION ADD-ON: CPT

## 2019-10-24 RX ORDER — IOPAMIDOL 755 MG/ML
90 INJECTION, SOLUTION INTRAVASCULAR ONCE
Status: COMPLETED | OUTPATIENT
Start: 2019-10-24 | End: 2019-10-24

## 2019-10-24 RX ORDER — ONDANSETRON 4 MG/1
4 TABLET, ORALLY DISINTEGRATING ORAL ONCE
Status: COMPLETED | OUTPATIENT
Start: 2019-10-24 | End: 2019-10-24

## 2019-10-24 RX ORDER — HYDROMORPHONE HYDROCHLORIDE 1 MG/ML
0.5 INJECTION, SOLUTION INTRAMUSCULAR; INTRAVENOUS; SUBCUTANEOUS
Status: DISCONTINUED | OUTPATIENT
Start: 2019-10-24 | End: 2019-10-24 | Stop reason: HOSPADM

## 2019-10-24 RX ORDER — ONDANSETRON 2 MG/ML
4 INJECTION INTRAMUSCULAR; INTRAVENOUS ONCE
Status: COMPLETED | OUTPATIENT
Start: 2019-10-24 | End: 2019-10-24

## 2019-10-24 RX ORDER — ONDANSETRON 4 MG/1
4 TABLET, ORALLY DISINTEGRATING ORAL EVERY 12 HOURS PRN
Qty: 10 TABLET | Refills: 0 | Status: SHIPPED | OUTPATIENT
Start: 2019-10-24 | End: 2019-10-24

## 2019-10-24 RX ORDER — ONDANSETRON 4 MG/1
4 TABLET, ORALLY DISINTEGRATING ORAL EVERY 8 HOURS PRN
Qty: 10 TABLET | Refills: 0 | Status: SHIPPED | OUTPATIENT
Start: 2019-10-24 | End: 2022-01-04

## 2019-10-24 RX ADMIN — ONDANSETRON 4 MG: 2 INJECTION INTRAMUSCULAR; INTRAVENOUS at 13:59

## 2019-10-24 RX ADMIN — IOPAMIDOL 90 ML: 755 INJECTION, SOLUTION INTRAVENOUS at 14:07

## 2019-10-24 RX ADMIN — SODIUM CHLORIDE 1000 ML: 9 INJECTION, SOLUTION INTRAVENOUS at 13:44

## 2019-10-24 RX ADMIN — SODIUM CHLORIDE 67 ML: 9 INJECTION, SOLUTION INTRAVENOUS at 14:08

## 2019-10-24 RX ADMIN — ONDANSETRON 4 MG: 4 TABLET, ORALLY DISINTEGRATING ORAL at 10:35

## 2019-10-24 ASSESSMENT — ENCOUNTER SYMPTOMS
ABDOMINAL PAIN: 1
NAUSEA: 1
DYSURIA: 0
FREQUENCY: 0
SHORTNESS OF BREATH: 0
VOMITING: 1
DIARRHEA: 0

## 2019-10-24 ASSESSMENT — MIFFLIN-ST. JEOR: SCORE: 1362.4

## 2019-10-24 NOTE — PROGRESS NOTES
SUBJECTIVE:   Connie Longoria is a 59 year old female  With h/o DM on insulin, CAD , CHF ,HTN, hyperlipidemia , gerd presenting with a chief complaint of nausea and vomiting. She is an established patient of [facility].  Onset of symptoms was 2 day(s) ago.  She threw up several times yesterday and today threw up almost 5 times.  And was very dizzy and fatigued and has been sleeping a lot per her  ..  Has no fever or chills.  Her blood pressure was also noted to be elevated.  Course of illness is worsening.    Severity moderate  Current and Associated symptoms: nausea, vomiting and stomach ache   Treatment measures tried include Tylenol/Ibuprofen.  Predisposing factors include bad food exposure .    Past Medical History:   Diagnosis Date     Congestive heart failure, unspecified      Coronary artery disease      CVA (cerebral vascular accident) (H)      Diabetes (H)      Gastro-oesophageal reflux disease      Glaucoma      Heart attack (H)      Hyperlipidemia      Hypertension      Renal disease     from diabetes and steroid use     Rheumatoid arthritis (H)      Stented coronary artery      Steroid long-term use      Stroke (cerebrum) (H)      Unspecified cerebral artery occlusion with cerebral infarction      Current Outpatient Medications   Medication Sig Dispense Refill     Alendronate Sodium (FOSAMAX PO) Take 70 mg by mouth every 30 days        aspirin 81 MG tablet Take 81 mg by mouth daily       brimonidine (ALPHAGAN) 0.2 % ophthalmic solution Place 1 drop into the right eye 2 times daily 15 mL 3     dorzolamide-timolol (COSOPT) 2-0.5 % ophthalmic solution Place 1 drop into the right eye 2 times daily 1 Bottle 11     gabapentin (NEURONTIN) 400 MG capsule Take 400 mg by mouth 3 times daily       inFLIXimab (REMICADE) 100 MG injection once       insulin detemir (LEVEMIR) 100 UNIT/ML injection Inject 35 Units Subcutaneous At Bedtime 3 mL 0     LISINOPRIL PO Take 10 mg by mouth daily       methotrexate 2.5 MG  tablet TK 4 TS PO Q WK  0     Metoclopramide HCl (REGLAN PO) Take 5 mg by mouth       METOPROLOL TARTRATE PO Take 25 mg by mouth daily       Omeprazole (PRILOSEC PO) Take 10 mg by mouth 2 times daily (before meals)       Ondansetron HCl (ZOFRAN PO) Take 4 mg by mouth       polyethylene glycol (MIRALAX/GLYCOLAX) powder Take 1 capful by mouth daily       SIMVASTATIN PO Take 10 mg by mouth At Bedtime       TRAVATAN Z 0.004 % ophthalmic solution Place 1 drop into the right eye At Bedtime 2.5 mL 11     Social History     Tobacco Use     Smoking status: Current Every Day Smoker     Packs/day: 0.20     Types: Cigarettes     Smokeless tobacco: Never Used     Tobacco comment: #2 cigs / day   Substance Use Topics     Alcohol use: No     Family History   Problem Relation Age of Onset     Diabetes Mother      Glaucoma Mother      Diabetes Brother      Glaucoma Sister      Diabetes Sister      Hypertension No family hx of      Macular Degeneration No family hx of      Retinal detachment No family hx of          ROS:    10 point ROS of systems including Constitutional, Eyes, Respiratory, Cardiovascular,  Genitourinary, Integumentary, Muscularskeletal, Psychiatric were all negative except for pertinent positives noted in my HPI         OBJECTIVE:  BP (!) 181/102   Pulse 105   Temp 98.3  F (36.8  C) (Oral)   Resp 16   Wt 78.9 kg (174 lb)   SpO2 98%   BMI 29.41 kg/m    GENERAL APPEARANCE: healthy, alert and mild  distress  EYES: EOMI,  PERRL, conjunctiva clear  HENT: ear canals and TM's normal.  Nose and mouth without ulcers, erythema or lesions  NECK: supple, nontender, no lymphadenopathy  RESP: lungs clear to auscultation - no rales, rhonchi or wheezes  CV: regular rates and rhythm, normal S1 S2, no murmur noted  ABDOMEN:  Soft,generalized tenderness  no HSM or masses and bowel sounds normal  SKIN: no suspicious lesions or rashes  PSYCH: mentation appears normal and fatigued  Physical Exam      X-Ray was not done.    Medical  Decision Making:    Differential Diagnosis:  Gastro: viral gastroenteritis and food poisoning  UTI/pancreatitis /gall stones     ASSESSMENT:  Connie was seen today for urgent care.    Diagnoses and all orders for this visit:    Nausea and vomiting, intractability of vomiting not specified, unspecified vomiting type  -     CBC with platelets differential  -     Comprehensive metabolic panel  -     ondansetron (ZOFRAN-ODT) ODT tab 4 mg    Gastroenteritis    Type 2 diabetes mellitus with other specified complication, with long-term current use of insulin (H)    Dehydration    Stomach ache        Results for orders placed or performed in visit on 10/24/19   CBC with platelets differential   Result Value Ref Range    WBC 10.6 4.0 - 11.0 10e9/L    RBC Count 4.75 3.8 - 5.2 10e12/L    Hemoglobin 15.9 (H) 11.7 - 15.7 g/dL    Hematocrit 45.9 35.0 - 47.0 %    MCV 97 78 - 100 fl    MCH 33.5 (H) 26.5 - 33.0 pg    MCHC 34.6 31.5 - 36.5 g/dL    RDW 12.4 10.0 - 15.0 %    Platelet Count 169 150 - 450 10e9/L    % Neutrophils 71.9 %    % Lymphocytes 22.9 %    % Monocytes 4.9 %    % Eosinophils 0.1 %    % Basophils 0.2 %    Absolute Neutrophil 7.6 1.6 - 8.3 10e9/L    Absolute Lymphocytes 2.4 0.8 - 5.3 10e9/L    Absolute Monocytes 0.5 0.0 - 1.3 10e9/L    Absolute Eosinophils 0.0 0.0 - 0.7 10e9/L    Absolute Basophils 0.0 0.0 - 0.2 10e9/L    Diff Method Automated Method    Comprehensive metabolic panel   Result Value Ref Range    Sodium 134 133 - 144 mmol/L    Potassium 4.1 3.4 - 5.3 mmol/L    Chloride 102 94 - 109 mmol/L    Carbon Dioxide 23 20 - 32 mmol/L    Anion Gap 9 3 - 14 mmol/L    Glucose 267 (H) 70 - 99 mg/dL    Urea Nitrogen 24 7 - 30 mg/dL    Creatinine 0.97 0.52 - 1.04 mg/dL    GFR Estimate 63 >60 mL/min/[1.73_m2]    GFR Estimate If Black 73 >60 mL/min/[1.73_m2]    Calcium 9.8 8.5 - 10.1 mg/dL    Bilirubin Total 0.6 0.2 - 1.3 mg/dL    Albumin 4.0 3.4 - 5.0 g/dL    Protein Total 9.1 (H) 6.8 - 8.8 g/dL    Alkaline Phosphatase 66  40 - 150 U/L    ALT 20 0 - 50 U/L    AST 21 0 - 45 U/L       PLAN:  Discussed with pt and  that she needs to follow up in ER for further work up   Also concerned about possible pancreatitis , she needs Ct scan of the abd   Pt will be taken to ER by her    After dose of zofran she did feel slightly better     See orders in Epic

## 2019-10-24 NOTE — ED TRIAGE NOTES
Patient sent to ED from urgent care for vomiting for 2 days.  States having blood in emesis. Denies diarrhea and fevers. Complains of some stomach pain. History of diabetes and has not taking her insulin for 2 days.

## 2019-10-24 NOTE — ED PROVIDER NOTES
History     Chief Complaint:  Dehydration    HPI   Connie Longoria is a 59 year old female with a history of diabetes who presents with dehydration. The patient states went down to adult day care in [place] and then went to the [employer] where she ate a chicken sandwich. The patient states that she went home and started to feel unwell. She states she was continuously vomiting a dark brown color vomit until today were it stopped in urgent care after Zofran. She also notes lower left quadrant abdominal pain. The patient denies a history of appendicitis, small bowel obstruction, or kidney stones. The patient denies chest pain, shortness of breath, diarrhea, dysuria or frequency of urination.     Allergies:  Ascorbate   Atorvastatin  Effexor  Rosuvastatin  Wellbutrin      Medications:    Gabapentin  Protonix  Metoprolol succinate  Lisinopril  Simvastatin  Lantus  Levemir  Fosamax    Past Medical History:    Congestive heart failure  Coronary artery disease    Cerebral vascular accident   Diabetes  GERD  Glaucoma   Heart disease  Hyperlipidemia  Hypertension  Renal disease  Rheumatoid arthritis   Stented coronary artery   Stroke (cerebrum)     Past Surgical History:    CABG  Cardiac surgery  Eye shunt placement  Cyclophotocoagulation transsclearl with micropulse laser   Implant valve eye  Revise glaucoma shunt  Carodid endarectomy     Family History:    Mother: diabetes, glaucoma  Brother: diabetes  Sister: glaucoma, diabetes     Social History:  The patient was accompanied to the ED by .  Smoking Status: Current Every Day Smoker  Smokeless Tobacco: Never Used  Alcohol Use: No  Drug Use: No  PCP: Jessica Brand  Marital Status:        Review of Systems   Respiratory: Negative for shortness of breath.    Cardiovascular: Negative for chest pain.   Gastrointestinal: Positive for abdominal pain, nausea and vomiting. Negative for diarrhea.   Genitourinary: Negative for dysuria and frequency.   All other  "systems reviewed and are negative.    Physical Exam     Patient Vitals for the past 24 hrs:   BP Temp Temp src Pulse Resp SpO2 Height Weight   10/24/19 1500 (!) 166/85 -- -- 98 -- 98 % -- --   10/24/19 1430 (!) 144/87 -- -- 101 16 -- -- --   10/24/19 1400 -- -- -- -- -- 100 % -- --   10/24/19 1205 138/89 98.1  F (36.7  C) Oral 104 18 98 % 1.626 m (5' 4\") 80.7 kg (178 lb)     Physical Exam  General: Alert, appears well-developed and well-nourished. Cooperative.     In mild distress  HEENT:  Head:  Atraumatic  Ears:  External ears are normal  Mouth/Throat:  Oropharynx is without erythema or exudate and mucous membranes are dry.   Eyes:   Conjunctivae normal and EOM are normal. No scleral icterus.  CV:  Normal rate, regular rhythm, normal heart sounds and radial pulses are 2+ and symmetric.  No murmur.  Resp:  Breath sounds are clear bilaterally    Non-labored, no retractions or accessory muscle use  GI:  Abdomen is soft, no distension, LLQ tenderness with guarding. No rebound.  No CVA tenderness bilaterally  MS:  Normal range of motion. No edema.    Normal strength in all 4 extremities.     Back atraumatic.    No midline cervical, thoracic, or lumbar tenderness  Skin:  Warm and dry.  No rash or lesions noted.  Neuro: Alert. Normal strength.  GCS: 15  Psych:  Normal mood and affect.    Emergency Department Course   Imaging:  Radiology findings were communicated with the patient who voiced understanding of the findings.    CT Abdomen Pelvis  No acute pathology in the abdomen or pelvis.   Reading per radiology       Laboratory:  Laboratory findings were communicated with the patient who voiced understanding of the findings.    Creatinine POCT: Creatinine 1.0, GFR estimate 57(L)    CBC: WBC 11.0, HGB 16.3 high,   CMP: glucose 242(H), glucose 9.1(H) o/w WNL (Creatinine 0.94)  Lipase: 59(L)    Interventions:  1344 NS 1000 mL IV  1359 Zofran 4 mg IV    Emergency Department Course:  Nursing notes and vitals " reviewed.    1330 I performed an exam of the patient as documented above.     IV was inserted and blood was drawn for laboratory testing, results above.    The patient was sent for a CT Abdomen Pelvis while in the emergency department, results above.     1442 I returned to update the patient.     Findings and plan explained to the Patient. Patient discharged home with instructions regarding supportive care, medications, and reasons to return. The importance of close follow-up was reviewed. The patient was prescribed Zofran.    Impression & Plan    Medical Decision Making:  Patient is a 59-year-old female with past medical history of diabetes, neovascular glaucoma of the left eye, and rheumatoid arthritis who presents with vomiting and nausea for the last 2 days as well as left lower quadrant abdominal pain.  Reassuringly CT imaging shows no evidence of sinister intra-abdominal pathology and no signs of diverticulitis.  Patient did improve symptomatically with IV fluids and Zofran here. She did not require IV pain medications.  Electrolytes appear normal and renal function within normal limits.  Patient is mildly hyperglycemic here, but this likely has improved after IV fluids alone.  No indication for additional insulin therapy as well in the emergency department.  Patient's CBC normal.  I suspect the patient's vomiting and nausea is likely secondary to potential viral illness.  Certainly will continue to monitor her symptoms at home the plan for discharge home with Zofran and continued oral rehydration.  Follow-up with primary care in 3 to 5 days for recheck or sooner to the emergency department if she develops fever, inability to tolerate oral intake, or any new worsening abdominal pain symptoms.  After return precautions understood and all questions answered patient discharged home.    Diagnosis:    ICD-10-CM    1. LLQ abdominal pain R10.32 CBC with platelets differential   2. Non-intractable vomiting with  nausea, unspecified vomiting type R11.2    3. Dehydration E86.0        Disposition:   The patient is discharged to home.    Discharge Medications:  New Prescriptions    ONDANSETRON (ZOFRAN ODT) 4 MG ODT TAB    Take 1 tablet (4 mg) by mouth every 8 hours as needed for nausea       Scribe Disclosure:  YIMI, Angelica Royal, am serving as a scribe at 1:23 PM on 10/24/2019 to document services personally performed by Albaro Camacho MD based on my observations and the provider's statements to me.    EMERGENCY DEPARTMENT       Albaro Camacho MD  10/24/19 [id number]

## 2019-10-24 NOTE — ED AVS SNAPSHOT
Emergency Department  [address] [place] [id number]  Phone:  [phone]  Fax:  [phone]                                    Connie Longoria   MRN: [id number]    Department:   Emergency Department   Date of Visit:  10/24/2019           After Visit Summary Signature Page    I have received my discharge instructions, and my questions have been answered. I have discussed any challenges I see with this plan with the nurse or doctor.    ..........................................................................................................................................  Patient/Patient Representative Signature      ..........................................................................................................................................  Patient Representative Print Name and Relationship to Patient    ..................................................               ................................................  Date                                   Time    ..........................................................................................................................................  Reviewed by Signature/Title    ...................................................              ..............................................  Date                                               Time          22EPIC Rev 08/18

## 2019-12-17 ENCOUNTER — OFFICE VISIT (OUTPATIENT)
Dept: OPHTHALMOLOGY | Facility: CLINIC | Age: 59
End: 2019-12-17
Attending: OPHTHALMOLOGY
Payer: MEDICARE

## 2019-12-17 DIAGNOSIS — Z96.1 PSEUDOPHAKIA OF BOTH EYES: ICD-10-CM

## 2019-12-17 DIAGNOSIS — H40.53X3 NEOVASCULAR GLAUCOMA OF BOTH EYES, SEVERE STAGE: Primary | ICD-10-CM

## 2019-12-17 DIAGNOSIS — H40.52X3 NVG (NEOVASCULAR GLAUCOMA), LEFT, SEVERE STAGE: ICD-10-CM

## 2019-12-17 PROCEDURE — G0463 HOSPITAL OUTPT CLINIC VISIT: HCPCS | Mod: ZF

## 2019-12-17 PROCEDURE — 92083 EXTENDED VISUAL FIELD XM: CPT | Mod: ZF | Performed by: OPHTHALMOLOGY

## 2019-12-17 RX ORDER — BRIMONIDINE TARTRATE 2 MG/ML
1 SOLUTION/ DROPS OPHTHALMIC 3 TIMES DAILY
Qty: 15 ML | Refills: 11 | Status: SHIPPED | OUTPATIENT
Start: 2019-12-17 | End: 2021-02-17

## 2019-12-17 RX ORDER — LATANOPROST 50 UG/ML
1 SOLUTION/ DROPS OPHTHALMIC AT BEDTIME
Qty: 1 BOTTLE | Refills: 11 | Status: SHIPPED | OUTPATIENT
Start: 2019-12-17 | End: 2022-01-04

## 2019-12-17 RX ORDER — DORZOLAMIDE HYDROCHLORIDE AND TIMOLOL MALEATE 20; 5 MG/ML; MG/ML
1 SOLUTION/ DROPS OPHTHALMIC 2 TIMES DAILY
Qty: 1 BOTTLE | Refills: 11 | Status: SHIPPED | OUTPATIENT
Start: 2019-12-17 | End: 2021-02-15

## 2019-12-17 ASSESSMENT — TONOMETRY
OD_IOP_MMHG: 27
OD_IOP_MMHG: 21
OD_IOP_MMHG: 21
IOP_METHOD: TONOPEN
IOP_METHOD: APPLANATION
OD_IOP_MMHG: 26
IOP_METHOD: APPLANATION
OS_IOP_MMHG: 24
OS_IOP_MMHG: 27
IOP_METHOD: TONOPEN

## 2019-12-17 ASSESSMENT — CONF VISUAL FIELD
OS_INFERIOR_TEMPORAL_RESTRICTION: 1
OS_SUPERIOR_NASAL_RESTRICTION: 1
METHOD: COUNTING FINGERS
OS_SUPERIOR_TEMPORAL_RESTRICTION: 1
OS_INFERIOR_NASAL_RESTRICTION: 1

## 2019-12-17 ASSESSMENT — VISUAL ACUITY
METHOD: SNELLEN - LINEAR
OD_PH_SC: 20/25
OD_SC: 20/30
OS_SC: NLP
OD_SC+: -1
OD_PH_SC+: -1

## 2019-12-17 ASSESSMENT — SLIT LAMP EXAM - LIDS: COMMENTS: NORMAL

## 2019-12-17 ASSESSMENT — EXTERNAL EXAM - LEFT EYE: OS_EXAM: NORMAL

## 2019-12-17 ASSESSMENT — CUP TO DISC RATIO: OD_RATIO: 0.3

## 2019-12-17 ASSESSMENT — EXTERNAL EXAM - RIGHT EYE: OD_EXAM: NORMAL

## 2019-12-17 NOTE — PATIENT INSTRUCTIONS
Monocular precautions discussion.  Patient will continue on Cosopt (Timolol/Dorzolamide) which is a blue top drop 2x/day (12 hours apart) in the RIGHT EYE ONLY and Alphagan (Brimonidine) which is a purple top drop 2x/day (12 hours apart) in the RIGHT EYE ONLY, and Travatan which is a teal top drop at bedtime in the RIGHT EYE ONLY.  Patient will return to clinic in 4-6 months with visual field test, dilated fundus exam, OCT with RNFL anaslysis and IOP check.

## 2019-12-17 NOTE — NURSING NOTE
Chief Complaints and History of Present Illnesses   Patient presents with     Glaucoma Follow-Up     5 month follow-up Neovascular glaucoma of both eyes     Chief Complaint(s) and History of Present Illness(es)     Glaucoma Follow-Up     Laterality: both eyes    Quality: blurred    Associated symptoms: Negative for dryness, eye pain and tearing    Treatment side effects: none    Compliance with Treatment: always    Pain scale: 0/10    Comments: 5 month follow-up Neovascular glaucoma of both eyes              Comments     Pt denies any significant vision changes RE since last visit.  Denies any pain, pressure, irritation, discharge, and tearing.  Currently using Cosopt BID RE, Alphagan BID RE, and Travatan at bedtime RE.    Robyn Mistry OT 9:57 AM December 17, 2019

## 2019-12-17 NOTE — PROGRESS NOTES
1)NVG OU -- s/p mCPC OD (4/17/19), s/p mCPC OD (4/2/18), s/p Tube OD (11/8/17), s/p TSCPC OS (4/25/16), s/p Tube OS x2 (8/15 and last in 11/15 by Dr. Harris), H/O tube revision x2 (last on 2/23/16), H/O tube retraction and conj retraction per old notes, etiology of vision loss -- K pachy: 618/--  Tmax: 60+ for both eyes HVF: OD:Sup edge points ?lid effect on first field  CDR: OD:0.3 and OS:No view HRT/OCT: OD:RNFL WNL   FHX of Glc: No  Gonio:Closed Intolerant to: Asthma/COPD: No, on po BB but smokes Steroid Use: Yes, was on po pred for RA in past  Kidney Stones: Mild CRI per patient (PCP is Dr. Carbajal at [facility] in [place]) Sulfa Allergy: No IOP targets: OD:L-M20s and OS:Comfort -- rec mCPC vs tube   2)DM c PDR -- s/p PRP OD (11/8/17), s/p Avastin OD -- following with Dr. Lockwood   3)RA on Plaquenil, prednisone and Remicade -- following with Rheumatologist [facility]  4)PCIOL OU  5)NLP OS   6)H/O CAD s/p CABG per old notes  7)Ptosis OS -- pt may be interested in ptosis repair OS      Patient will discontinue and hold onto the Diamox 250mg pills.  DO NOT USE THESE PILLS.    Patient will also continue Genteal gel 4-6x/day in the left eye.       Monocular precautions discussion.  Patient will continue on Cosopt (Timolol/Dorzolamide) which is a blue top drop 2x/day (12 hours apart) in the RIGHT EYE ONLY and Alphagan (Brimonidine) which is a purple top drop 2x/day (12 hours apart) in the RIGHT EYE ONLY, and Travatan which is a teal top drop at bedtime in the RIGHT EYE ONLY.  Patient will return to clinic in 4-6 months with visual field test, dilated fundus exam, OCT with RNFL anaslysis and IOP check.          Attending Physician Attestation:  Complete documentation of historical and exam elements from today's encounter can be found in the full encounter summary report (not reduplicated in this progress note). I personally obtained the chief complaint(s) and history of present illness.  I  confirmed and edited as necessary the review of systems, past medical/surgical history, family history, social history, and examination findings as documented by others; and I examined the patient myself. I personally reviewed the relevant tests, images, and reports as documented above. I formulated and edited as necessary the assessment and plan and discussed the findings and management plan with the patient and family.  - Bethany William MD

## 2020-02-17 ENCOUNTER — TRANSFERRED RECORDS (OUTPATIENT)
Dept: HEALTH INFORMATION MANAGEMENT | Facility: CLINIC | Age: 60
End: 2020-02-17

## 2020-04-02 ENCOUNTER — TELEPHONE (OUTPATIENT)
Dept: OPHTHALMOLOGY | Facility: CLINIC | Age: 60
End: 2020-04-02

## 2020-04-02 NOTE — TELEPHONE ENCOUNTER
Reviewed with pt recommendation were for 4-6 month f/u from December visit    Reviewed June 23 appt with Dr. Nataliia santiago during COVID scheduling precautions    Pt verbally demonstrated understanding    Mason Williamson RN 2:35 PM 04/02/20

## 2020-04-02 NOTE — TELEPHONE ENCOUNTER
[facility] Call Center    Phone Message    May a detailed message be left on voicemail: yes     Reason for Call: Other: Pt requesting call back to discuss an appt for this month for her Right eye. Pt has some questions     Action Taken: Message routed to:  [facility] ([facility] eye    Travel Screening: Not Applicable

## 2020-06-01 DIAGNOSIS — H40.53X3: ICD-10-CM

## 2020-06-01 RX ORDER — DORZOLAMIDE HCL 20 MG/ML
1 SOLUTION/ DROPS OPHTHALMIC 2 TIMES DAILY
Qty: 1 BOTTLE | Refills: 3 | OUTPATIENT
Start: 2020-06-01

## 2020-06-01 NOTE — TELEPHONE ENCOUNTER
dorzolamide (TRUSOPT) 2 % ophthalmic solution   dorzolamide (TRUSOPT) 2 % ophthalmic solution (Discontinued)  1 Bottle  3  1/23/2019 4/12/2019  --    Sig - Route: Place 1 drop into the right eye 2 times daily - Right Eye    Sent to pharmacy as: dorzolamide (TRUSOPT) 2 % ophthalmic solution    Class: E-Prescribe    Reason for Discontinue: Medication Reconciliation Clean Up    Order: [id number]    E-Prescribing Status: Receipt confirmed by pharmacy (1/23/2019  2:26 PM CST)          Jo Ann Barkley RN  Central Triage Red Flags/Med Refills

## 2020-06-01 NOTE — TELEPHONE ENCOUNTER
timolol maleate (TIMOPTIC) 0.5 % ophthalmic solution    Last Written Prescription Date:  ?  Last Fill Quantity: ?  # refills: ?  Last Office Visit : 12/17/2019  Bethany William MD   Future Office visit:  6/23/2020    Routing refill request to provider for review/approval because:  Drug not active on patient's medication list    Jo Ann Barkley RN  Central Triage Red Flags/Med Refills

## 2020-06-03 RX ORDER — TIMOLOL MALEATE 5 MG/ML
1 SOLUTION/ DROPS OPHTHALMIC DAILY
Qty: 5 ML | OUTPATIENT
Start: 2020-06-03

## 2020-06-03 NOTE — TELEPHONE ENCOUNTER
Reviewed chart. Patient is already on Cosopt so this medication is no longer needed. Refill declined.

## 2020-06-03 NOTE — TELEPHONE ENCOUNTER
Received refill request for Timolol. Reviewed chart. Patient is on Cosopt which contains Timolol so no need to refill the Timolol today.

## 2020-06-04 DIAGNOSIS — H40.53X3 NEOVASCULAR GLAUCOMA OF BOTH EYES, SEVERE STAGE: ICD-10-CM

## 2020-06-04 RX ORDER — TRAVOPROST 0.04 MG/ML
1 SOLUTION/ DROPS OPHTHALMIC AT BEDTIME
Qty: 2.5 ML | Refills: 1 | Status: SHIPPED | OUTPATIENT
Start: 2020-06-04 | End: 2020-12-01

## 2020-06-04 NOTE — TELEPHONE ENCOUNTER
TRAVATAN Z 0.004 % ophthalmic solution   Dx:   Neovascular glaucoma of both eyes, severe stage     Requested directions:  Place 1 drop into the right eye At Bedtime - Right Eye  Current directions on the medication list:  Place 1 drop into the right eye At Bedtime - Right Eye    Last Written Prescription Date:  5/21/2019  Last Fill Quantity: 2.5,   # refills: 11    Last Office Visit:  12/17/2019  Future Office visit: 6/23/2020    Attending Provider: Bethany William MD Ophthalmology  Last Clinic Note:  12/17/2019    Monocular precautions discussion.  Patient will continue on Cosopt (Timolol/Dorzolamide) which is a blue top drop 2x/day (12 hours apart) in the RIGHT EYE ONLY and Alphagan (Brimonidine) which is a purple top drop 2x/day (12 hours apart) in the RIGHT EYE ONLY, and Travatan which is a teal top drop at bedtime in the RIGHT EYE ONLY.  Patient will return to clinic in 4-6 months with visual field test, dilated fundus exam, OCT with RNFL anaslysis and IOP check.        2.5 mL, 1 Refill sent to pharm 6/4/2020    Jo Ann Barkley RN  Central Triage Red Flags/Med Refills

## 2020-06-23 ENCOUNTER — OFFICE VISIT (OUTPATIENT)
Dept: OPHTHALMOLOGY | Facility: CLINIC | Age: 60
End: 2020-06-23
Attending: OPHTHALMOLOGY
Payer: MEDICARE

## 2020-06-23 DIAGNOSIS — Z96.1 PSEUDOPHAKIA OF BOTH EYES: ICD-10-CM

## 2020-06-23 DIAGNOSIS — H40.53X3 NEOVASCULAR GLAUCOMA OF BOTH EYES, SEVERE STAGE: Primary | ICD-10-CM

## 2020-06-23 PROBLEM — H40.52X0 NEOVASCULAR GLAUCOMA OF LEFT EYE: Status: ACTIVE | Noted: 2017-03-15

## 2020-06-23 PROBLEM — M25.552 BILATERAL HIP PAIN: Status: ACTIVE | Noted: 2019-07-26

## 2020-06-23 PROBLEM — H54.62 VISION LOSS OF LEFT EYE: Status: ACTIVE | Noted: 2017-03-15

## 2020-06-23 PROBLEM — Z79.52 LONG TERM CURRENT USE OF SYSTEMIC STEROIDS: Status: ACTIVE | Noted: 2017-05-22

## 2020-06-23 PROBLEM — G62.9 PERIPHERAL NEUROPATHY: Status: ACTIVE | Noted: 2020-06-23

## 2020-06-23 PROBLEM — H53.8 BLURRED VISION, RIGHT EYE: Status: ACTIVE | Noted: 2017-03-15

## 2020-06-23 PROBLEM — E66.811 OBESITY (BMI 30.0-34.9): Status: ACTIVE | Noted: 2019-01-02

## 2020-06-23 PROBLEM — I65.29 CAROTID ARTERY STENOSIS: Status: ACTIVE | Noted: 2020-06-23

## 2020-06-23 PROBLEM — M05.9 RHEUMATOID ARTHRITIS, SEROPOSITIVE (H): Status: ACTIVE | Noted: 2017-05-22

## 2020-06-23 PROBLEM — R32 URINARY INCONTINENCE: Status: ACTIVE | Noted: 2018-10-01

## 2020-06-23 PROBLEM — M25.551 BILATERAL HIP PAIN: Status: ACTIVE | Noted: 2019-07-26

## 2020-06-23 PROBLEM — R19.5 FECAL OCCULT BLOOD TEST POSITIVE: Status: ACTIVE | Noted: 2020-01-29

## 2020-06-23 PROCEDURE — G0463 HOSPITAL OUTPT CLINIC VISIT: HCPCS | Mod: ZF

## 2020-06-23 PROCEDURE — 92083 EXTENDED VISUAL FIELD XM: CPT | Mod: ZF | Performed by: OPHTHALMOLOGY

## 2020-06-23 PROCEDURE — 92133 CPTRZD OPH DX IMG PST SGM ON: CPT | Mod: ZF | Performed by: OPHTHALMOLOGY

## 2020-06-23 ASSESSMENT — TONOMETRY
OS_IOP_MMHG: 22
IOP_METHOD: APPLANATION
OD_IOP_MMHG: 20

## 2020-06-23 ASSESSMENT — SLIT LAMP EXAM - LIDS: COMMENTS: NORMAL

## 2020-06-23 ASSESSMENT — CONF VISUAL FIELD
OD_NORMAL: 1
OS_SUPERIOR_TEMPORAL_RESTRICTION: 1
METHOD: COUNTING FINGERS
OS_INFERIOR_NASAL_RESTRICTION: 1
OS_INFERIOR_TEMPORAL_RESTRICTION: 1
OS_SUPERIOR_NASAL_RESTRICTION: 1

## 2020-06-23 ASSESSMENT — VISUAL ACUITY
METHOD: SNELLEN - LINEAR
OS_SC: NLP
OD_SC+: -1
OD_SC: 20/30

## 2020-06-23 ASSESSMENT — EXTERNAL EXAM - RIGHT EYE: OD_EXAM: NORMAL

## 2020-06-23 ASSESSMENT — CUP TO DISC RATIO: OD_RATIO: 0.3

## 2020-06-23 ASSESSMENT — EXTERNAL EXAM - LEFT EYE: OS_EXAM: NORMAL

## 2020-06-23 NOTE — NURSING NOTE
"Chief Complaints and History of Present Illnesses   Patient presents with     Glaucoma Follow-Up     Chief Complaint(s) and History of Present Illness(es)     Glaucoma Follow-Up     Laterality: both eyes    Treatment side effects: none    Compliance with Treatment: always    Pain scale: 0/10              Comments     6mo f/u bilateral NVG  Vision is \"good.\" No additional comments or concerns except a question about when she can resume wearing eye liner. Hasn't worn eye makeup in ~5yrs.     Ocular meds [OD only]: Cosopt bid (LG@ 8am), Alphagan bid (LG@ 8am), Travatan qhs (LG@ 9pm)    DM2  Last  yesterday afternoon  Last A1c 6.6 01/20/2020    Kimberley Bergman COT 9:02 AM June 23, 2020                   "

## 2020-06-23 NOTE — PROGRESS NOTES
1)NVG OU -- s/p mCPC OD (4/17/19), s/p mCPC OD (4/2/18), s/p Tube OD (11/8/17), s/p TSCPC OS (4/25/16), s/p Tube OS x2 (8/15 and last in 11/15 by Dr. Harris), H/O tube revision x2 (last on 2/23/16), H/O tube retraction and conj retraction per old notes, etiology of vision loss -- K pachy: 618/--  Tmax: 60+ for both eyes HVF: OD:Sup edge points ?lid effect on first field  CDR: OD:0.3 and OS:No view HRT/OCT: OD:RNFL WNL   FHX of Glc: No  Gonio:Closed Intolerant to: Asthma/COPD: No, on po BB but smokes Steroid Use: Yes, was on po pred for RA in past  Kidney Stones: Mild CRI per patient (PCP is Dr. Carbajal at [facility] in [place]) Sulfa Allergy: No IOP targets: OD:L-M20s and OS:Comfort -- rec mCPC vs tube   2)DM c PDR -- s/p PRP OD (11/8/17), s/p Avastin OD -- following with Dr. Lockwood   3)RA on Plaquenil, prednisone and Remicade -- following with Rheumatologist [facility]  4)PCIOL OU  5)NLP OS   6)H/O CAD s/p CABG per old notes  7)Ptosis OS -- pt may be interested in ptosis repair OS      Patient will discontinue and hold onto the Diamox 250mg pills.  DO NOT USE THESE PILLS.    Patient will also continue Genteal gel 4-6x/day in the left eye.       Monocular precautions discussion.  Patient will continue on Cosopt (Timolol/Dorzolamide) which is a blue top drop 2x/day (12 hours apart) in the RIGHT EYE ONLY and Alphagan (Brimonidine) which is a purple top drop 2x/day (12 hours apart) in the RIGHT EYE ONLY, and Travatan which is a teal top drop at bedtime in the RIGHT EYE ONLY.  Patient will return to clinic in 4-6 months with visual field test, dilated fundus exam, disc photos and IOP check.          Attending Physician Attestation:  Complete documentation of historical and exam elements from today's encounter can be found in the full encounter summary report (not reduplicated in this progress note). I personally obtained the chief complaint(s) and history of present illness.  I confirmed and  edited as necessary the review of systems, past medical/surgical history, family history, social history, and examination findings as documented by others; and I examined the patient myself. I personally reviewed the relevant tests, images, and reports as documented above. I formulated and edited as necessary the assessment and plan and discussed the findings and management plan with the patient and family.  - Bethany William MD

## 2020-06-23 NOTE — PATIENT INSTRUCTIONS
Patient will continue on Cosopt (Timolol/Dorzolamide) which is a blue top drop 2x/day (12 hours apart) in the RIGHT EYE ONLY and Alphagan (Brimonidine) which is a purple top drop 2x/day (12 hours apart) in the RIGHT EYE ONLY, and Travatan which is a teal top drop at bedtime in the RIGHT EYE ONLY.  Patient will return to clinic in 4-6 months with visual field test, dilated fundus exam, disc photos and IOP check.

## 2020-12-01 DIAGNOSIS — H40.53X3 NEOVASCULAR GLAUCOMA OF BOTH EYES, SEVERE STAGE: ICD-10-CM

## 2020-12-01 RX ORDER — TRAVOPROST 0.04 MG/ML
1 SOLUTION/ DROPS OPHTHALMIC AT BEDTIME
Qty: 2.5 ML | Refills: 0 | Status: SHIPPED | OUTPATIENT
Start: 2020-12-01 | End: 2021-02-22

## 2020-12-01 NOTE — TELEPHONE ENCOUNTER
Last Clinic Visit: 6/23/20 with recommended 4-6 month follow up.  No visits scheduled.  3 month refill provided per protocol.  Last clinic note:  Monocular precautions discussion.  Patient will continue on Cosopt (Timolol/Dorzolamide) which is a blue top drop 2x/day (12 hours apart) in the RIGHT EYE ONLY and Alphagan (Brimonidine) which is a purple top drop 2x/day (12 hours apart) in the RIGHT EYE ONLY, and Travatan which is a teal top drop at bedtime in the RIGHT EYE ONLY.  Patient will return to clinic in 4-6 months with visual field test, dilated fundus exam, disc photos and IOP check.

## 2021-02-15 DIAGNOSIS — H40.53X3 NEOVASCULAR GLAUCOMA OF BOTH EYES, SEVERE STAGE: ICD-10-CM

## 2021-02-17 RX ORDER — BRIMONIDINE TARTRATE 2 MG/ML
1 SOLUTION/ DROPS OPHTHALMIC EVERY 12 HOURS
Qty: 15 ML | Refills: 1 | Status: SHIPPED | OUTPATIENT
Start: 2021-02-17 | End: 2022-01-04

## 2021-02-17 RX ORDER — DORZOLAMIDE HYDROCHLORIDE AND TIMOLOL MALEATE 20; 5 MG/ML; MG/ML
1 SOLUTION/ DROPS OPHTHALMIC 2 TIMES DAILY
Qty: 10 ML | Refills: 0 | Status: SHIPPED | OUTPATIENT
Start: 2021-02-17 | End: 2021-06-21

## 2021-02-17 NOTE — TELEPHONE ENCOUNTER
Medication: dorzolamide-timolol (COSOPT) 2-0.5 % ophthalmic solution    Dx: Neovascular glaucoma of both eyes, severe stage   Requested directions: same  Current directions on the medication list: Place 1 drop into the right eye 2 times daily   Last Written Prescription Date:  12/17/19  Last Fill Quantity: 1 bottle,   # refills: 11    Medication: brimonidine (ALPHAGAN) 0.2 % ophthalmic solution    Dx: Neovascular glaucoma of both eyes, severe stage   Requested directions: same  Current directions on the medication list: 1 drop into the right eye 3 times daily     Last Written Prescription Date:  12/17/19  Last Fill Quantity: 15,   # refills: 11    Last Office Visit: 6/23/20  Future Office visit: none    Attending Provider: Bethany William MD  Last Clinic Note: 6/23/20  Patient will continue on Cosopt (Timolol/Dorzolamide) which is a blue top drop 2x/day (12 hours apart) in the RIGHT EYE ONLY   - Alphagan (Brimonidine) which is a purple top drop 2x/day (12 hours apart) in the RIGHT EYE ONLY,     Patient will return to clinic in 4-6 months with visual field test, dilated fundus exam, disc photos and IOP check      Routing refill request to provider for review/approval because:  Inconsistent dose/directions - Alphagan pharm request 3x/day right eye. Per last visit , 2x/day right eye.     Scheduling has been notified to contact the pt for appointment.

## 2021-02-18 NOTE — TELEPHONE ENCOUNTER
Patient is due for a glaucoma follow up as per latest glaucoma note in June 2020. Please assist with follow up scheduling

## 2021-02-22 DIAGNOSIS — H40.53X3 NEOVASCULAR GLAUCOMA OF BOTH EYES, SEVERE STAGE: ICD-10-CM

## 2021-02-22 RX ORDER — TRAVOPROST 0.04 MG/ML
1 SOLUTION/ DROPS OPHTHALMIC AT BEDTIME
Qty: 5 ML | Refills: 0 | Status: SHIPPED | OUTPATIENT
Start: 2021-02-22 | End: 2021-03-02

## 2021-03-01 ENCOUNTER — TELEPHONE (OUTPATIENT)
Dept: OPHTHALMOLOGY | Facility: CLINIC | Age: 61
End: 2021-03-01

## 2021-03-01 DIAGNOSIS — H40.53X3 NEOVASCULAR GLAUCOMA OF BOTH EYES, SEVERE STAGE: ICD-10-CM

## 2021-03-01 NOTE — TELEPHONE ENCOUNTER
PA Initiation    Medication: TRAVATAN Z 0.004 % ophthalmic solution   Insurance Company: [employer] - Phone [phone] Fax [phone]  Pharmacy Filling the Rx: [id number] - [place], [place] - [address] AT [facility] [address]  Filling Pharmacy Phone: [phone]  Filling Pharmacy Fax: [phone]  Start Date: 3/1/2021

## 2021-03-01 NOTE — TELEPHONE ENCOUNTER
Prior Authorization Specialty Medication Request    Medication/Dose:   ICD code (if different than what is on RX):    Previously Tried and Failed:    Important Lab Values:   Rationale:     Insurance Name:  Insurance ID:   Insurance Phone Number:     Pharmacy Information (if different than what is on RX)  Name:   Phone:

## 2021-03-02 RX ORDER — TRAVOPROST OPHTHALMIC SOLUTION 0.04 MG/ML
1 SOLUTION OPHTHALMIC AT BEDTIME
Qty: 5 ML | Refills: 0 | Status: SHIPPED | OUTPATIENT
Start: 2021-03-02 | End: 2022-01-04

## 2021-03-02 NOTE — TELEPHONE ENCOUNTER
Insurance sent over additional questions asking if patient is unable to use preferred medications.  If provider is okay with using preferred, please send pharmacy new scripts.  Otherwise please provide a statement of medical necessity.  PLEASE PROVIDE BY 3/3/3021

## 2021-03-02 NOTE — TELEPHONE ENCOUNTER
Rx for generic travatan written 'travaprost'      Left message with patient at 1240 reviewing Rx for generic sent    Mason Williamson RN 12:50 PM 03/02/21

## 2021-03-22 DIAGNOSIS — H40.53X3 NEOVASCULAR GLAUCOMA OF BOTH EYES, SEVERE STAGE: Primary | ICD-10-CM

## 2021-06-21 DIAGNOSIS — H40.53X3 NEOVASCULAR GLAUCOMA OF BOTH EYES, SEVERE STAGE: ICD-10-CM

## 2021-06-21 RX ORDER — DORZOLAMIDE HYDROCHLORIDE AND TIMOLOL MALEATE 20; 5 MG/ML; MG/ML
1 SOLUTION/ DROPS OPHTHALMIC 2 TIMES DAILY
Qty: 10 ML | Refills: 0 | Status: SHIPPED | OUTPATIENT
Start: 2021-06-21 | End: 2022-01-04

## 2021-06-21 NOTE — TELEPHONE ENCOUNTER
dorzolamide-timolol (COSOPT) 2-0.5 % ophthalmic solution    Requested directions: Place 1 drop into the right eye 2 times daily .For additional refills, please schedule a follow-up appointment at [phone]. - Right Eye  Current directions on the medication list:  Place 1 drop into the right eye 2 times daily .For additional refills, please schedule a follow-up appointment at [phone]. - Right Eye    Last Written Prescription Date:   2/17/2021  Last Fill Quantity: 10,   # refills: 0    Last Office Visit: 6/23/2020  Future Office visit:  None    Attending Provider:     Bethany William MD  Ophthalmology     Last Clinic Note:  6/23/2020    Patient will discontinue and hold onto the Diamox 250mg pills.  DO NOT USE THESE PILLS.     Patient will also continue Genteal gel 4-6x/day in the left eye.         Monocular precautions discussion.  Patient will continue on Cosopt (Timolol/Dorzolamide) which is a blue top drop 2x/day (12 hours apart) in the RIGHT EYE ONLY and Alphagan (Brimonidine) which is a purple top drop 2x/day (12 hours apart) in the RIGHT EYE ONLY, and Travatan which is a teal top drop at bedtime in the RIGHT EYE ONLY.  Patient will return to clinic in 4-6 months with visual field test, dilated fundus exam, disc photos and IOP check.         Routing refill request to provider for review/approval because:  Second Request      Dr. William gone  Pt needs to establish care with new provider for Pt care.  Please call Pt to schedule.    Thank you       Jo Ann Barkley RN  Central Triage Red Flags/Med Refills

## 2021-06-24 NOTE — TELEPHONE ENCOUNTER
Spoke to pt at 1645    Pt states seeing another eye provider now.    Reviewed with pt will need to update pharmacy to send refill requests to pt's new provider/location.    Pt verbally demonstrated understanding    Mason Williamson RN 4:48 PM 06/24/21

## 2021-12-20 DIAGNOSIS — H40.53X3 NEOVASCULAR GLAUCOMA OF BOTH EYES, SEVERE STAGE: ICD-10-CM

## 2021-12-20 RX ORDER — BRIMONIDINE TARTRATE 2 MG/ML
1 SOLUTION/ DROPS OPHTHALMIC EVERY 12 HOURS
Qty: 15 ML | Refills: 1 | OUTPATIENT
Start: 2021-12-20

## 2021-12-20 NOTE — TELEPHONE ENCOUNTER
pt no longer being seen at [facility], pharm called    See RF note 6-21-21

## 2022-01-03 ENCOUNTER — HOSPITAL ENCOUNTER (INPATIENT)
Facility: CLINIC | Age: 62
LOS: 3 days | Discharge: NURSING, REHAB OR LONG-TERM CARE | DRG: 308 | End: 2022-01-06
Attending: EMERGENCY MEDICINE | Admitting: STUDENT IN AN ORGANIZED HEALTH CARE EDUCATION/TRAINING PROGRAM
Payer: MEDICARE

## 2022-01-03 ENCOUNTER — APPOINTMENT (OUTPATIENT)
Dept: GENERAL RADIOLOGY | Facility: CLINIC | Age: 62
DRG: 308 | End: 2022-01-03
Attending: EMERGENCY MEDICINE
Payer: MEDICARE

## 2022-01-03 DIAGNOSIS — I48.91 ATRIAL FIBRILLATION WITH RVR (H): ICD-10-CM

## 2022-01-03 DIAGNOSIS — I48.0 PAROXYSMAL ATRIAL FIBRILLATION (H): Primary | ICD-10-CM

## 2022-01-03 DIAGNOSIS — E86.0 DEHYDRATION: ICD-10-CM

## 2022-01-03 DIAGNOSIS — R73.9 HYPERGLYCEMIA: ICD-10-CM

## 2022-01-03 DIAGNOSIS — E87.1 HYPONATREMIA: ICD-10-CM

## 2022-01-03 DIAGNOSIS — E11.22 TYPE 2 DIABETES MELLITUS WITH STAGE 3A CHRONIC KIDNEY DISEASE, WITH LONG-TERM CURRENT USE OF INSULIN (H): ICD-10-CM

## 2022-01-03 DIAGNOSIS — Z79.4 TYPE 2 DIABETES MELLITUS WITH STAGE 3A CHRONIC KIDNEY DISEASE, WITH LONG-TERM CURRENT USE OF INSULIN (H): ICD-10-CM

## 2022-01-03 DIAGNOSIS — N18.31 TYPE 2 DIABETES MELLITUS WITH STAGE 3A CHRONIC KIDNEY DISEASE, WITH LONG-TERM CURRENT USE OF INSULIN (H): ICD-10-CM

## 2022-01-03 DIAGNOSIS — F32.1 CURRENT MODERATE EPISODE OF MAJOR DEPRESSIVE DISORDER, UNSPECIFIED WHETHER RECURRENT (H): ICD-10-CM

## 2022-01-03 DIAGNOSIS — N28.9 RENAL INSUFFICIENCY: ICD-10-CM

## 2022-01-03 DIAGNOSIS — R79.89 ELEVATED TROPONIN: ICD-10-CM

## 2022-01-03 PROBLEM — K21.9 CHRONIC GERD: Status: ACTIVE | Noted: 2017-10-17

## 2022-01-03 PROBLEM — M05.742 RHEUMATOID ARTHRITIS INVOLVING BOTH HANDS WITH POSITIVE RHEUMATOID FACTOR (H): Status: ACTIVE | Noted: 2018-03-27

## 2022-01-03 PROBLEM — M05.741 RHEUMATOID ARTHRITIS INVOLVING BOTH HANDS WITH POSITIVE RHEUMATOID FACTOR (H): Status: ACTIVE | Noted: 2018-03-27

## 2022-01-03 PROBLEM — F32.A DEPRESSION: Status: ACTIVE | Noted: 2020-06-23

## 2022-01-03 LAB
ALBUMIN SERPL-MCNC: 3.8 G/DL (ref 3.4–5)
ALBUMIN UR-MCNC: 30 MG/DL
ALP SERPL-CCNC: 70 U/L (ref 40–150)
ALT SERPL W P-5'-P-CCNC: 21 U/L (ref 0–50)
ANION GAP SERPL CALCULATED.3IONS-SCNC: 14 MMOL/L (ref 3–14)
APPEARANCE UR: ABNORMAL
AST SERPL W P-5'-P-CCNC: 29 U/L (ref 0–45)
BASOPHILS # BLD AUTO: 0.1 10E3/UL (ref 0–0.2)
BASOPHILS NFR BLD AUTO: 0 %
BILIRUB SERPL-MCNC: 0.7 MG/DL (ref 0.2–1.3)
BILIRUB UR QL STRIP: NEGATIVE
BUN SERPL-MCNC: 77 MG/DL (ref 7–30)
CALCIUM SERPL-MCNC: 10.6 MG/DL (ref 8.5–10.1)
CHLORIDE BLD-SCNC: 87 MMOL/L (ref 94–109)
CO2 SERPL-SCNC: 25 MMOL/L (ref 20–32)
COLOR UR AUTO: ABNORMAL
CREAT SERPL-MCNC: 1.95 MG/DL (ref 0.52–1.04)
EOSINOPHIL # BLD AUTO: 0.1 10E3/UL (ref 0–0.7)
EOSINOPHIL NFR BLD AUTO: 0 %
ERYTHROCYTE [DISTWIDTH] IN BLOOD BY AUTOMATED COUNT: 12.5 % (ref 10–15)
FLUAV RNA SPEC QL NAA+PROBE: NEGATIVE
FLUBV RNA RESP QL NAA+PROBE: NEGATIVE
GFR SERPL CREATININE-BSD FRML MDRD: 28 ML/MIN/1.73M2
GLUCOSE BLD-MCNC: 384 MG/DL (ref 70–99)
GLUCOSE BLDC GLUCOMTR-MCNC: 349 MG/DL (ref 70–99)
GLUCOSE UR STRIP-MCNC: >=1000 MG/DL
HCO3 BLDV-SCNC: 27 MMOL/L (ref 21–28)
HCT VFR BLD AUTO: 50.1 % (ref 35–47)
HGB BLD-MCNC: 17.2 G/DL (ref 11.7–15.7)
HGB UR QL STRIP: ABNORMAL
HOLD SPECIMEN: NORMAL
HOLD SPECIMEN: NORMAL
IMM GRANULOCYTES # BLD: 0.1 10E3/UL
IMM GRANULOCYTES NFR BLD: 1 %
KETONES BLD-SCNC: 1.4 MMOL/L (ref 0–0.6)
KETONES UR STRIP-MCNC: 10 MG/DL
LACTATE BLD-SCNC: 2.9 MMOL/L
LACTATE SERPL-SCNC: 2 MMOL/L (ref 0.7–2)
LACTATE SERPL-SCNC: 2.7 MMOL/L (ref 0.7–2)
LEUKOCYTE ESTERASE UR QL STRIP: ABNORMAL
LIPASE SERPL-CCNC: 81 U/L (ref 73–393)
LYMPHOCYTES # BLD AUTO: 3.5 10E3/UL (ref 0.8–5.3)
LYMPHOCYTES NFR BLD AUTO: 32 %
MAGNESIUM SERPL-MCNC: 2.6 MG/DL (ref 1.6–2.3)
MCH RBC QN AUTO: 31.8 PG (ref 26.5–33)
MCHC RBC AUTO-ENTMCNC: 34.3 G/DL (ref 31.5–36.5)
MCV RBC AUTO: 93 FL (ref 78–100)
MONOCYTES # BLD AUTO: 1.1 10E3/UL (ref 0–1.3)
MONOCYTES NFR BLD AUTO: 10 %
MUCOUS THREADS #/AREA URNS LPF: PRESENT /LPF
NEUTROPHILS # BLD AUTO: 6.3 10E3/UL (ref 1.6–8.3)
NEUTROPHILS NFR BLD AUTO: 57 %
NITRATE UR QL: NEGATIVE
NRBC # BLD AUTO: 0 10E3/UL
NRBC BLD AUTO-RTO: 0 /100
NT-PROBNP SERPL-MCNC: 2406 PG/ML (ref 0–900)
PCO2 BLDV: 43 MM HG (ref 40–50)
PH BLDV: 7.41 [PH] (ref 7.32–7.43)
PH UR STRIP: 5.5 [PH] (ref 5–7)
PLATELET # BLD AUTO: 205 10E3/UL (ref 150–450)
PO2 BLDV: 35 MM HG (ref 25–47)
POTASSIUM BLD-SCNC: 4 MMOL/L (ref 3.4–5.3)
PROCALCITONIN SERPL-MCNC: 0.34 NG/ML
PROT SERPL-MCNC: 8.7 G/DL (ref 6.8–8.8)
RBC # BLD AUTO: 5.41 10E6/UL (ref 3.8–5.2)
RBC URINE: 3 /HPF
SAO2 % BLDV: 67 % (ref 94–100)
SARS-COV-2 RNA RESP QL NAA+PROBE: NEGATIVE
SODIUM SERPL-SCNC: 126 MMOL/L (ref 133–144)
SP GR UR STRIP: 1.02 (ref 1–1.03)
SQUAMOUS EPITHELIAL: 1 /HPF
TROPONIN I SERPL HS-MCNC: 280 NG/L
UROBILINOGEN UR STRIP-MCNC: NORMAL MG/DL
WBC # BLD AUTO: 11.1 10E3/UL (ref 4–11)
WBC CLUMPS #/AREA URNS HPF: PRESENT /HPF
WBC URINE: 42 /HPF

## 2022-01-03 PROCEDURE — 83880 ASSAY OF NATRIURETIC PEPTIDE: CPT | Performed by: EMERGENCY MEDICINE

## 2022-01-03 PROCEDURE — 99291 CRITICAL CARE FIRST HOUR: CPT | Mod: 25

## 2022-01-03 PROCEDURE — 84484 ASSAY OF TROPONIN QUANT: CPT | Performed by: EMERGENCY MEDICINE

## 2022-01-03 PROCEDURE — 99223 1ST HOSP IP/OBS HIGH 75: CPT | Mod: AI | Performed by: STUDENT IN AN ORGANIZED HEALTH CARE EDUCATION/TRAINING PROGRAM

## 2022-01-03 PROCEDURE — 96365 THER/PROPH/DIAG IV INF INIT: CPT

## 2022-01-03 PROCEDURE — 81001 URINALYSIS AUTO W/SCOPE: CPT | Performed by: EMERGENCY MEDICINE

## 2022-01-03 PROCEDURE — 93005 ELECTROCARDIOGRAM TRACING: CPT | Mod: 76

## 2022-01-03 PROCEDURE — C9803 HOPD COVID-19 SPEC COLLECT: HCPCS

## 2022-01-03 PROCEDURE — 250N000009 HC RX 250: Performed by: EMERGENCY MEDICINE

## 2022-01-03 PROCEDURE — 36415 COLL VENOUS BLD VENIPUNCTURE: CPT | Performed by: EMERGENCY MEDICINE

## 2022-01-03 PROCEDURE — 93005 ELECTROCARDIOGRAM TRACING: CPT

## 2022-01-03 PROCEDURE — 83036 HEMOGLOBIN GLYCOSYLATED A1C: CPT | Performed by: STUDENT IN AN ORGANIZED HEALTH CARE EDUCATION/TRAINING PROGRAM

## 2022-01-03 PROCEDURE — 96361 HYDRATE IV INFUSION ADD-ON: CPT

## 2022-01-03 PROCEDURE — 258N000003 HC RX IP 258 OP 636: Performed by: STUDENT IN AN ORGANIZED HEALTH CARE EDUCATION/TRAINING PROGRAM

## 2022-01-03 PROCEDURE — 210N000002 HC R&B HEART CARE

## 2022-01-03 PROCEDURE — 96376 TX/PRO/DX INJ SAME DRUG ADON: CPT

## 2022-01-03 PROCEDURE — 96366 THER/PROPH/DIAG IV INF ADDON: CPT

## 2022-01-03 PROCEDURE — 87086 URINE CULTURE/COLONY COUNT: CPT | Performed by: EMERGENCY MEDICINE

## 2022-01-03 PROCEDURE — 87040 BLOOD CULTURE FOR BACTERIA: CPT | Performed by: EMERGENCY MEDICINE

## 2022-01-03 PROCEDURE — 82803 BLOOD GASES ANY COMBINATION: CPT

## 2022-01-03 PROCEDURE — 250N000011 HC RX IP 250 OP 636: Performed by: STUDENT IN AN ORGANIZED HEALTH CARE EDUCATION/TRAINING PROGRAM

## 2022-01-03 PROCEDURE — 83735 ASSAY OF MAGNESIUM: CPT | Performed by: EMERGENCY MEDICINE

## 2022-01-03 PROCEDURE — 83605 ASSAY OF LACTIC ACID: CPT | Performed by: EMERGENCY MEDICINE

## 2022-01-03 PROCEDURE — 250N000011 HC RX IP 250 OP 636: Performed by: EMERGENCY MEDICINE

## 2022-01-03 PROCEDURE — 83690 ASSAY OF LIPASE: CPT | Performed by: EMERGENCY MEDICINE

## 2022-01-03 PROCEDURE — 258N000003 HC RX IP 258 OP 636: Performed by: EMERGENCY MEDICINE

## 2022-01-03 PROCEDURE — 96375 TX/PRO/DX INJ NEW DRUG ADDON: CPT

## 2022-01-03 PROCEDURE — 82010 KETONE BODYS QUAN: CPT | Performed by: EMERGENCY MEDICINE

## 2022-01-03 PROCEDURE — 85025 COMPLETE CBC W/AUTO DIFF WBC: CPT | Performed by: EMERGENCY MEDICINE

## 2022-01-03 PROCEDURE — 87636 SARSCOV2 & INF A&B AMP PRB: CPT | Performed by: EMERGENCY MEDICINE

## 2022-01-03 PROCEDURE — 71045 X-RAY EXAM CHEST 1 VIEW: CPT

## 2022-01-03 PROCEDURE — 80053 COMPREHEN METABOLIC PANEL: CPT | Performed by: EMERGENCY MEDICINE

## 2022-01-03 PROCEDURE — 84145 PROCALCITONIN (PCT): CPT | Performed by: EMERGENCY MEDICINE

## 2022-01-03 PROCEDURE — 96360 HYDRATION IV INFUSION INIT: CPT | Mod: 59

## 2022-01-03 RX ORDER — HEPARIN SODIUM 10000 [USP'U]/100ML
0-5000 INJECTION, SOLUTION INTRAVENOUS CONTINUOUS
Status: DISCONTINUED | OUTPATIENT
Start: 2022-01-03 | End: 2022-01-04

## 2022-01-03 RX ORDER — CEFTRIAXONE 2 G/1
2 INJECTION, POWDER, FOR SOLUTION INTRAMUSCULAR; INTRAVENOUS ONCE
Status: COMPLETED | OUTPATIENT
Start: 2022-01-03 | End: 2022-01-04

## 2022-01-03 RX ORDER — SODIUM CHLORIDE 9 MG/ML
INJECTION, SOLUTION INTRAVENOUS CONTINUOUS
Status: DISCONTINUED | OUTPATIENT
Start: 2022-01-03 | End: 2022-01-04 | Stop reason: ALTCHOICE

## 2022-01-03 RX ORDER — METOPROLOL TARTRATE 1 MG/ML
5 INJECTION, SOLUTION INTRAVENOUS
Status: DISPENSED | OUTPATIENT
Start: 2022-01-03 | End: 2022-01-03

## 2022-01-03 RX ADMIN — METOPROLOL TARTRATE 5 MG: 5 INJECTION INTRAVENOUS at 22:32

## 2022-01-03 RX ADMIN — METOPROLOL TARTRATE 5 MG: 5 INJECTION INTRAVENOUS at 22:40

## 2022-01-03 RX ADMIN — SODIUM CHLORIDE 1000 ML: 9 INJECTION, SOLUTION INTRAVENOUS at 20:41

## 2022-01-03 RX ADMIN — SODIUM CHLORIDE 1000 ML: 9 INJECTION, SOLUTION INTRAVENOUS at 21:57

## 2022-01-03 RX ADMIN — CEFTRIAXONE SODIUM 2 G: 2 INJECTION, POWDER, FOR SOLUTION INTRAMUSCULAR; INTRAVENOUS at 21:56

## 2022-01-03 RX ADMIN — HEPARIN SODIUM 950 UNITS/HR: 1000 INJECTION, SOLUTION INTRAVENOUS; SUBCUTANEOUS at 22:40

## 2022-01-04 ENCOUNTER — APPOINTMENT (OUTPATIENT)
Dept: PHYSICAL THERAPY | Facility: CLINIC | Age: 62
DRG: 308 | End: 2022-01-04
Attending: INTERNAL MEDICINE
Payer: MEDICARE

## 2022-01-04 ENCOUNTER — APPOINTMENT (OUTPATIENT)
Dept: CARDIOLOGY | Facility: CLINIC | Age: 62
DRG: 308 | End: 2022-01-04
Attending: STUDENT IN AN ORGANIZED HEALTH CARE EDUCATION/TRAINING PROGRAM
Payer: MEDICARE

## 2022-01-04 ENCOUNTER — APPOINTMENT (OUTPATIENT)
Dept: GENERAL RADIOLOGY | Facility: CLINIC | Age: 62
DRG: 308 | End: 2022-01-04
Attending: INTERNAL MEDICINE
Payer: MEDICARE

## 2022-01-04 LAB
ALBUMIN SERPL-MCNC: 3.1 G/DL (ref 3.4–5)
ALP SERPL-CCNC: 54 U/L (ref 40–150)
ALT SERPL W P-5'-P-CCNC: 19 U/L (ref 0–50)
ANION GAP SERPL CALCULATED.3IONS-SCNC: 8 MMOL/L (ref 3–14)
AST SERPL W P-5'-P-CCNC: 33 U/L (ref 0–45)
ATRIAL RATE - MUSE: 178 BPM
ATRIAL RATE - MUSE: 67 BPM
BILIRUB DIRECT SERPL-MCNC: 0.1 MG/DL (ref 0–0.2)
BILIRUB SERPL-MCNC: 0.5 MG/DL (ref 0.2–1.3)
BUN SERPL-MCNC: 61 MG/DL (ref 7–30)
CALCIUM SERPL-MCNC: 8.5 MG/DL (ref 8.5–10.1)
CHLORIDE BLD-SCNC: 99 MMOL/L (ref 94–109)
CO2 SERPL-SCNC: 27 MMOL/L (ref 20–32)
CREAT SERPL-MCNC: 1.56 MG/DL (ref 0.52–1.04)
DIASTOLIC BLOOD PRESSURE - MUSE: NORMAL MMHG
DIASTOLIC BLOOD PRESSURE - MUSE: NORMAL MMHG
ERYTHROCYTE [DISTWIDTH] IN BLOOD BY AUTOMATED COUNT: 12.4 % (ref 10–15)
GFR SERPL CREATININE-BSD FRML MDRD: 37 ML/MIN/1.73M2
GLUCOSE BLD-MCNC: 228 MG/DL (ref 70–99)
GLUCOSE BLDC GLUCOMTR-MCNC: 197 MG/DL (ref 70–99)
GLUCOSE BLDC GLUCOMTR-MCNC: 204 MG/DL (ref 70–99)
GLUCOSE BLDC GLUCOMTR-MCNC: 260 MG/DL (ref 70–99)
GLUCOSE BLDC GLUCOMTR-MCNC: 281 MG/DL (ref 70–99)
GLUCOSE BLDC GLUCOMTR-MCNC: 282 MG/DL (ref 70–99)
HBA1C MFR BLD: 6.4 % (ref 0–5.6)
HCT VFR BLD AUTO: 41.9 % (ref 35–47)
HGB BLD-MCNC: 14.1 G/DL (ref 11.7–15.7)
INTERPRETATION ECG - MUSE: NORMAL
INTERPRETATION ECG - MUSE: NORMAL
LVEF ECHO: NORMAL
MCH RBC QN AUTO: 31.2 PG (ref 26.5–33)
MCHC RBC AUTO-ENTMCNC: 33.7 G/DL (ref 31.5–36.5)
MCV RBC AUTO: 93 FL (ref 78–100)
P AXIS - MUSE: 57 DEGREES
P AXIS - MUSE: NORMAL DEGREES
PLATELET # BLD AUTO: 169 10E3/UL (ref 150–450)
POTASSIUM BLD-SCNC: 3.4 MMOL/L (ref 3.4–5.3)
PR INTERVAL - MUSE: 160 MS
PR INTERVAL - MUSE: NORMAL MS
PROT SERPL-MCNC: 7.2 G/DL (ref 6.8–8.8)
QRS DURATION - MUSE: 104 MS
QRS DURATION - MUSE: 112 MS
QT - MUSE: 290 MS
QT - MUSE: 442 MS
QTC - MUSE: 465 MS
QTC - MUSE: 467 MS
R AXIS - MUSE: 66 DEGREES
R AXIS - MUSE: 70 DEGREES
RBC # BLD AUTO: 4.52 10E6/UL (ref 3.8–5.2)
SODIUM SERPL-SCNC: 134 MMOL/L (ref 133–144)
SYSTOLIC BLOOD PRESSURE - MUSE: NORMAL MMHG
SYSTOLIC BLOOD PRESSURE - MUSE: NORMAL MMHG
T AXIS - MUSE: 180 DEGREES
T AXIS - MUSE: 187 DEGREES
TROPONIN I SERPL HS-MCNC: 376 NG/L
TROPONIN I SERPL HS-MCNC: 493 NG/L
TSH SERPL DL<=0.005 MIU/L-ACNC: 0.48 MU/L (ref 0.4–4)
UFH PPP CHRO-ACNC: 0.3 IU/ML
UFH PPP CHRO-ACNC: 0.46 IU/ML
UFH PPP CHRO-ACNC: 0.86 IU/ML
VENTRICULAR RATE- MUSE: 155 BPM
VENTRICULAR RATE- MUSE: 67 BPM
WBC # BLD AUTO: 9.9 10E3/UL (ref 4–11)

## 2022-01-04 PROCEDURE — 85520 HEPARIN ASSAY: CPT | Performed by: STUDENT IN AN ORGANIZED HEALTH CARE EDUCATION/TRAINING PROGRAM

## 2022-01-04 PROCEDURE — 73030 X-RAY EXAM OF SHOULDER: CPT | Mod: LT

## 2022-01-04 PROCEDURE — 255N000002 HC RX 255 OP 636: Performed by: STUDENT IN AN ORGANIZED HEALTH CARE EDUCATION/TRAINING PROGRAM

## 2022-01-04 PROCEDURE — 250N000012 HC RX MED GY IP 250 OP 636 PS 637: Performed by: STUDENT IN AN ORGANIZED HEALTH CARE EDUCATION/TRAINING PROGRAM

## 2022-01-04 PROCEDURE — 250N000013 HC RX MED GY IP 250 OP 250 PS 637: Performed by: STUDENT IN AN ORGANIZED HEALTH CARE EDUCATION/TRAINING PROGRAM

## 2022-01-04 PROCEDURE — 82248 BILIRUBIN DIRECT: CPT | Performed by: STUDENT IN AN ORGANIZED HEALTH CARE EDUCATION/TRAINING PROGRAM

## 2022-01-04 PROCEDURE — 97530 THERAPEUTIC ACTIVITIES: CPT | Mod: GP | Performed by: PHYSICAL THERAPIST

## 2022-01-04 PROCEDURE — 36415 COLL VENOUS BLD VENIPUNCTURE: CPT | Performed by: EMERGENCY MEDICINE

## 2022-01-04 PROCEDURE — 250N000011 HC RX IP 250 OP 636: Performed by: STUDENT IN AN ORGANIZED HEALTH CARE EDUCATION/TRAINING PROGRAM

## 2022-01-04 PROCEDURE — 93306 TTE W/DOPPLER COMPLETE: CPT | Mod: 26 | Performed by: INTERNAL MEDICINE

## 2022-01-04 PROCEDURE — 97116 GAIT TRAINING THERAPY: CPT | Mod: GP | Performed by: PHYSICAL THERAPIST

## 2022-01-04 PROCEDURE — 84484 ASSAY OF TROPONIN QUANT: CPT | Performed by: STUDENT IN AN ORGANIZED HEALTH CARE EDUCATION/TRAINING PROGRAM

## 2022-01-04 PROCEDURE — 85520 HEPARIN ASSAY: CPT | Performed by: EMERGENCY MEDICINE

## 2022-01-04 PROCEDURE — 84443 ASSAY THYROID STIM HORMONE: CPT | Performed by: INTERNAL MEDICINE

## 2022-01-04 PROCEDURE — 258N000003 HC RX IP 258 OP 636: Performed by: STUDENT IN AN ORGANIZED HEALTH CARE EDUCATION/TRAINING PROGRAM

## 2022-01-04 PROCEDURE — 82310 ASSAY OF CALCIUM: CPT | Performed by: STUDENT IN AN ORGANIZED HEALTH CARE EDUCATION/TRAINING PROGRAM

## 2022-01-04 PROCEDURE — 999N000208 ECHOCARDIOGRAM COMPLETE

## 2022-01-04 PROCEDURE — 97162 PT EVAL MOD COMPLEX 30 MIN: CPT | Mod: GP | Performed by: PHYSICAL THERAPIST

## 2022-01-04 PROCEDURE — 84484 ASSAY OF TROPONIN QUANT: CPT | Performed by: PHYSICIAN ASSISTANT

## 2022-01-04 PROCEDURE — 99233 SBSQ HOSP IP/OBS HIGH 50: CPT | Performed by: INTERNAL MEDICINE

## 2022-01-04 PROCEDURE — 250N000013 HC RX MED GY IP 250 OP 250 PS 637: Performed by: INTERNAL MEDICINE

## 2022-01-04 PROCEDURE — 210N000002 HC R&B HEART CARE

## 2022-01-04 PROCEDURE — 85027 COMPLETE CBC AUTOMATED: CPT | Performed by: STUDENT IN AN ORGANIZED HEALTH CARE EDUCATION/TRAINING PROGRAM

## 2022-01-04 PROCEDURE — 36415 COLL VENOUS BLD VENIPUNCTURE: CPT | Performed by: STUDENT IN AN ORGANIZED HEALTH CARE EDUCATION/TRAINING PROGRAM

## 2022-01-04 PROCEDURE — 99222 1ST HOSP IP/OBS MODERATE 55: CPT | Mod: 25 | Performed by: INTERNAL MEDICINE

## 2022-01-04 PROCEDURE — 250N000009 HC RX 250: Performed by: STUDENT IN AN ORGANIZED HEALTH CARE EDUCATION/TRAINING PROGRAM

## 2022-01-04 RX ORDER — METOPROLOL SUCCINATE 25 MG/1
25 TABLET, EXTENDED RELEASE ORAL DAILY
COMMUNITY
End: 2022-01-14

## 2022-01-04 RX ORDER — SODIUM CHLORIDE, SODIUM LACTATE, POTASSIUM CHLORIDE, CALCIUM CHLORIDE 600; 310; 30; 20 MG/100ML; MG/100ML; MG/100ML; MG/100ML
INJECTION, SOLUTION INTRAVENOUS CONTINUOUS
Status: DISCONTINUED | OUTPATIENT
Start: 2022-01-04 | End: 2022-01-04

## 2022-01-04 RX ORDER — HEPARIN SODIUM 10000 [USP'U]/100ML
0-5000 INJECTION, SOLUTION INTRAVENOUS CONTINUOUS
Status: DISCONTINUED | OUTPATIENT
Start: 2022-01-04 | End: 2022-01-04

## 2022-01-04 RX ORDER — ONDANSETRON 4 MG/1
4 TABLET, ORALLY DISINTEGRATING ORAL EVERY 6 HOURS PRN
Status: DISCONTINUED | OUTPATIENT
Start: 2022-01-04 | End: 2022-01-06 | Stop reason: HOSPADM

## 2022-01-04 RX ORDER — DEXTROSE MONOHYDRATE 25 G/50ML
25-50 INJECTION, SOLUTION INTRAVENOUS
Status: DISCONTINUED | OUTPATIENT
Start: 2022-01-04 | End: 2022-01-06 | Stop reason: HOSPADM

## 2022-01-04 RX ORDER — METOCLOPRAMIDE 5 MG/1
5 TABLET ORAL 4 TIMES DAILY PRN
COMMUNITY
End: 2022-01-08

## 2022-01-04 RX ORDER — CEFTRIAXONE 1 G/1
1 INJECTION, POWDER, FOR SOLUTION INTRAMUSCULAR; INTRAVENOUS EVERY 24 HOURS
Status: DISCONTINUED | OUTPATIENT
Start: 2022-01-04 | End: 2022-01-06 | Stop reason: HOSPADM

## 2022-01-04 RX ORDER — BRIMONIDINE TARTRATE 2 MG/ML
1 SOLUTION/ DROPS OPHTHALMIC EVERY 12 HOURS
Status: DISCONTINUED | OUTPATIENT
Start: 2022-01-04 | End: 2022-01-04

## 2022-01-04 RX ORDER — BRIMONIDINE TARTRATE AND TIMOLOL MALEATE 2; 5 MG/ML; MG/ML
1 SOLUTION OPHTHALMIC 2 TIMES DAILY
COMMUNITY

## 2022-01-04 RX ORDER — BIMATOPROST 0.3 MG/ML
1 SOLUTION/ DROPS OPHTHALMIC AT BEDTIME
COMMUNITY
End: 2022-01-04

## 2022-01-04 RX ORDER — ACETAMINOPHEN 325 MG/1
650 TABLET ORAL EVERY 6 HOURS PRN
Status: DISCONTINUED | OUTPATIENT
Start: 2022-01-04 | End: 2022-01-06 | Stop reason: HOSPADM

## 2022-01-04 RX ORDER — BRIMONIDINE TARTRATE AND TIMOLOL MALEATE 2; 5 MG/ML; MG/ML
1 SOLUTION OPHTHALMIC 2 TIMES DAILY
Status: DISCONTINUED | OUTPATIENT
Start: 2022-01-04 | End: 2022-01-06 | Stop reason: HOSPADM

## 2022-01-04 RX ORDER — METOPROLOL SUCCINATE 25 MG/1
25 TABLET, EXTENDED RELEASE ORAL DAILY
Status: DISCONTINUED | OUTPATIENT
Start: 2022-01-04 | End: 2022-01-06 | Stop reason: HOSPADM

## 2022-01-04 RX ORDER — SIMVASTATIN 10 MG
10 TABLET ORAL AT BEDTIME
Status: DISCONTINUED | OUTPATIENT
Start: 2022-01-04 | End: 2022-01-06 | Stop reason: HOSPADM

## 2022-01-04 RX ORDER — ASPIRIN 81 MG/1
81 TABLET ORAL DAILY
Status: DISCONTINUED | OUTPATIENT
Start: 2022-01-04 | End: 2022-01-06 | Stop reason: HOSPADM

## 2022-01-04 RX ORDER — LIDOCAINE 40 MG/G
CREAM TOPICAL
Status: DISCONTINUED | OUTPATIENT
Start: 2022-01-04 | End: 2022-01-06 | Stop reason: HOSPADM

## 2022-01-04 RX ORDER — GABAPENTIN 600 MG/1
600 TABLET ORAL 3 TIMES DAILY
COMMUNITY
End: 2022-01-14

## 2022-01-04 RX ORDER — DORZOLAMIDE HCL 20 MG/ML
1 SOLUTION/ DROPS OPHTHALMIC 2 TIMES DAILY
COMMUNITY

## 2022-01-04 RX ORDER — ONDANSETRON 2 MG/ML
4 INJECTION INTRAMUSCULAR; INTRAVENOUS EVERY 6 HOURS PRN
Status: DISCONTINUED | OUTPATIENT
Start: 2022-01-04 | End: 2022-01-06 | Stop reason: HOSPADM

## 2022-01-04 RX ORDER — SIMVASTATIN 20 MG
20 TABLET ORAL AT BEDTIME
COMMUNITY
End: 2022-01-14

## 2022-01-04 RX ORDER — GABAPENTIN 300 MG/1
600 CAPSULE ORAL 3 TIMES DAILY
Status: DISCONTINUED | OUTPATIENT
Start: 2022-01-04 | End: 2022-01-06 | Stop reason: HOSPADM

## 2022-01-04 RX ORDER — NICOTINE POLACRILEX 4 MG
15-30 LOZENGE BUCCAL
Status: DISCONTINUED | OUTPATIENT
Start: 2022-01-04 | End: 2022-01-06 | Stop reason: HOSPADM

## 2022-01-04 RX ORDER — LATANOPROST 50 UG/ML
1 SOLUTION/ DROPS OPHTHALMIC AT BEDTIME
Status: DISCONTINUED | OUTPATIENT
Start: 2022-01-04 | End: 2022-01-04

## 2022-01-04 RX ORDER — DORZOLAMIDE HCL 20 MG/ML
1 SOLUTION/ DROPS OPHTHALMIC 2 TIMES DAILY
Status: DISCONTINUED | OUTPATIENT
Start: 2022-01-04 | End: 2022-01-06 | Stop reason: HOSPADM

## 2022-01-04 RX ADMIN — SODIUM CHLORIDE, POTASSIUM CHLORIDE, SODIUM LACTATE AND CALCIUM CHLORIDE: 600; 310; 30; 20 INJECTION, SOLUTION INTRAVENOUS at 04:57

## 2022-01-04 RX ADMIN — BRIMONIDINE TARTRATE, TIMOLOL MALEATE 1 DROP: 2; 5 SOLUTION/ DROPS OPHTHALMIC at 12:06

## 2022-01-04 RX ADMIN — APIXABAN 5 MG: 5 TABLET, FILM COATED ORAL at 20:26

## 2022-01-04 RX ADMIN — INSULIN ASPART 4 UNITS: 100 INJECTION, SOLUTION INTRAVENOUS; SUBCUTANEOUS at 02:47

## 2022-01-04 RX ADMIN — DORZOLAMIDE HYDROCHLORIDE 1 DROP: 20 SOLUTION/ DROPS OPHTHALMIC at 20:27

## 2022-01-04 RX ADMIN — ACETAMINOPHEN 650 MG: 325 TABLET, FILM COATED ORAL at 21:41

## 2022-01-04 RX ADMIN — METOPROLOL SUCCINATE 25 MG: 25 TABLET, EXTENDED RELEASE ORAL at 08:22

## 2022-01-04 RX ADMIN — CEFTRIAXONE SODIUM 1 G: 1 INJECTION, POWDER, FOR SOLUTION INTRAMUSCULAR; INTRAVENOUS at 20:26

## 2022-01-04 RX ADMIN — BRIMONIDINE TARTRATE, TIMOLOL MALEATE 1 DROP: 2; 5 SOLUTION/ DROPS OPHTHALMIC at 20:27

## 2022-01-04 RX ADMIN — GABAPENTIN 600 MG: 300 CAPSULE ORAL at 16:20

## 2022-01-04 RX ADMIN — SIMVASTATIN 10 MG: 10 TABLET, FILM COATED ORAL at 21:41

## 2022-01-04 RX ADMIN — DORZOLAMIDE HYDROCHLORIDE 1 DROP: 20 SOLUTION/ DROPS OPHTHALMIC at 12:05

## 2022-01-04 RX ADMIN — INSULIN ASPART 3 UNITS: 100 INJECTION, SOLUTION INTRAVENOUS; SUBCUTANEOUS at 21:41

## 2022-01-04 RX ADMIN — GABAPENTIN 600 MG: 300 CAPSULE ORAL at 20:26

## 2022-01-04 RX ADMIN — ASPIRIN 81 MG: 81 TABLET, COATED ORAL at 08:22

## 2022-01-04 RX ADMIN — INSULIN GLARGINE 35 UNITS: 100 INJECTION, SOLUTION SUBCUTANEOUS at 21:41

## 2022-01-04 RX ADMIN — HUMAN ALBUMIN MICROSPHERES AND PERFLUTREN 9 ML: 10; .22 INJECTION, SOLUTION INTRAVENOUS at 14:48

## 2022-01-04 RX ADMIN — GABAPENTIN 600 MG: 300 CAPSULE ORAL at 08:22

## 2022-01-04 ASSESSMENT — ACTIVITIES OF DAILY LIVING (ADL)
ADLS_ACUITY_SCORE: 13
ADLS_ACUITY_SCORE: 12
ADLS_ACUITY_SCORE: 13
ADLS_ACUITY_SCORE: 8
ADLS_ACUITY_SCORE: 12
ADLS_ACUITY_SCORE: 13
ADLS_ACUITY_SCORE: 12
ADLS_ACUITY_SCORE: 13
ADLS_ACUITY_SCORE: 13
ADLS_ACUITY_SCORE: 14
ADLS_ACUITY_SCORE: 13
ADLS_ACUITY_SCORE: 13
ADLS_ACUITY_SCORE: 8
ADLS_ACUITY_SCORE: 14
ADLS_ACUITY_SCORE: 13
ADLS_ACUITY_SCORE: 8
ADLS_ACUITY_SCORE: 8
ADLS_ACUITY_SCORE: 12
ADLS_ACUITY_SCORE: 14
ADLS_ACUITY_SCORE: 14
ADLS_ACUITY_SCORE: 12
ADLS_ACUITY_SCORE: 13
ADLS_ACUITY_SCORE: 12
ADLS_ACUITY_SCORE: 13

## 2022-01-04 ASSESSMENT — ENCOUNTER SYMPTOMS
COUGH: 1
FEVER: 0
ABDOMINAL PAIN: 1
NAUSEA: 1
DIARRHEA: 0
HEADACHES: 0
VOMITING: 1

## 2022-01-04 NOTE — PHARMACY-ADMISSION MEDICATION HISTORY
"Pharmacy Medication History  Admission medication history interview status for the 1/3/2022  admission is in progress. See [facility] admission navigator for prior to admission medications     Confirmed with [facility], pt has not filled Methotrexate there, last seen in 2020 and she notes she \"has not taken in awhile\", last filled Pantoprazole 20mg/day 3/2021 and she acknowledges she has not taken in almost a year - removed Folic acid, confirmed she is no longer on Prednisone 2.5mg daily     Location of Interview: Patient room  Medication history sources: Patient and Surescripts    Significant changes made to the medication list:  1) Adjusted metoprolol tartrate to succinate  2) gabapentin is 600mg three times daily (not 400mg)  3) Levemir changed to Lantus  4) Eye drops updated to Combigan, Lumigan and dorzolamide  5) deleted Zofran, miralax  6) added Vitamin D and folic acid     In the past week, patient estimated taking medication this percent of the time: 50-90% due to illness    Medication reconciliation completed by provider prior to medication history? Yes    Time spent in this activity: 20 minutes    Prior to Admission medications    Medication Sig Last Dose Taking? Auth Provider   aspirin 81 MG tablet Take 81 mg by mouth daily Past Week at Unknown time Yes Reported, Patient   bimatoprost (LUMIGAN) 0.01 % SOLN Place 1 drop into the right eye At Bedtime Past Week at Unknown time Yes Unknown, Entered By History   brimonidine-timolol (COMBIGAN) 0.2-0.5 % ophthalmic solution Place 1 drop into the right eye 2 times daily Past Week at Unknown time Yes Unknown, Entered By History   dorzolamide (TRUSOPT) 2 % ophthalmic solution Place 1 drop into the right eye 2 times daily Past Week at Unknown time Yes Unknown, Entered By History   FOLIC ACID PO Take 1 tablet by mouth daily OTC: Pt unsure of strength Past Week at Unknown time Yes Unknown, Entered By History   gabapentin (NEURONTIN) 600 MG tablet Take 600 " mg by mouth 3 times daily Past Week at Unknown time Yes Unknown, Entered By History   insulin glargine (LANTUS PEN) 100 UNIT/ML pen Inject 35 Units Subcutaneous At Bedtime Past Week at Unknown time Yes Unknown, Entered By History   LISINOPRIL PO Take 10 mg by mouth daily Past Week at Unknown time Yes Reported, Patient   metoclopramide (REGLAN) 5 MG tablet Take 5 mg by mouth 4 times daily as needed prn med Yes Unknown, Entered By History   metoprolol succinate ER (TOPROL-XL) 25 MG 24 hr tablet Take 25 mg by mouth daily Past Week at Unknown time Yes Unknown, Entered By History   simvastatin (ZOCOR) 20 MG tablet Take 20 mg by mouth At Bedtime Past Week at Unknown time Yes Unknown, Entered By History   VITAMIN D PO Take 1 tablet by mouth daily OTC: Patient unsure of strength Past Week at Unknown time Yes Unknown, Entered By History   methotrexate 2.5 MG tablet TK 4 TS PO Q WK Unknown  Reported, Patient   Omeprazole (PRILOSEC PO) Take 20 mg by mouth every morning  Unknown  Reported, Patient       The information provided in this note is only as accurate as the sources available at the time of update(s)

## 2022-01-04 NOTE — ED NOTES
[facility]  ED Nurse Handoff Report    ED Chief complaint: Altered Mental Status      ED Diagnosis:   Final diagnoses:   None       Code Status: Full Code    Allergies:   Allergies   Allergen Reactions     Ascorbate      Other reaction(s): Angioedema  Mouth swells     Atorvastatin      Denies allergy     Effexor [Venlafaxine] Nausea and Vomiting     PN: LW Reaction: gi upset     Other  [No Clinical Screening - See Comments]      Mouth swells  Other reaction(s): Angioedema  PN: LW Other1: -all berry     Rosuvastatin Other (See Comments)     ALT elevated  Alt elevated  Other reaction(s): Other - Describe In Comment Field  ALT elevated     Wellbutrin [Bupropion] Other (See Comments)     Severe HTN even on meds when on wellbutrin  Other reaction(s): Hypertension       Patient Story: Pt presents via EMS for eval of not feeling well for 4 days. Pt reports weakness, urinary frequency, and abd pain. Pt is Diabetic.  Focused Assessment:  +Afib with RVR    Treatments and/or interventions provided: IVF 2L, Metoprolol 5mg 2x, Heparin bolus and gtt  Patient's response to treatments and/or interventions: Converted to SR aft 10 mg  metoprolol    To be done/followed up on inpatient unit: Cards Consult.     Does this patient have any cognitive concerns?: NA    Activity level - Baseline/Home:  Independent  Activity Level - Current:   Unknown, did not get patient up to walk de to HR. Pt able to reposition in bed without problem.    Patient's Preferred language: English   Needed?: No    Isolation: None  Infection: Not Applicable  Patient tested for COVID 19 prior to admission: YES  Bariatric?: No    Vital Signs:   Vitals:    01/03/22 2200 01/03/22 2215 01/03/22 2230 01/03/22 2245   BP: 109/83 (!) 131/114 (!) 147/113 138/89   Pulse: (!) 163 (!) 147 (!) 135 67   Resp: 27 22 17 9   SpO2:       Weight: 80.2 kg (176 lb 12.9 oz)          Cardiac Rhythm:Cardiac Rhythm: Atrial fibrillation    Was the PSS-3  completed:   Yes  What interventions are required if any?               Family Comments: Available by phone  OBS brochure/video discussed/provided to patient/family: N/A              Name of person given brochure if not patient: NA              Relationship to patient: NA    For the majority of the shift this patient's behavior was Green.   Behavioral interventions performed were NA.    ED NURSE PHONE NUMBER: [id number]

## 2022-01-04 NOTE — PROGRESS NOTES
01/04/22 1300   Quick Adds   Type of Visit Initial PT Evaluation   Living Environment   People in home alone   Current Living Arrangements apartment   Home Accessibility no concerns   Transportation Anticipated family or friend will provide   Self-Care   Usual Activity Tolerance good   Current Activity Tolerance fair   Regular Exercise No   Equipment Currently Used at Home cane, straight;grab bar, tub/shower;grab bar, toilet;raised toilet seat;tub bench   Activity/Exercise/Self-Care Comment pt I with amb, and self cares   Disability/Function   Hearing Difficulty or Deaf no   Wear Glasses or Blind yes   Vision Management pt blind in L eye   Concentrating, Remembering or Making Decisions Difficulty no   Difficulty Communicating no   Difficulty Eating/Swallowing no   Walking or Climbing Stairs Difficulty no   Dressing/Bathing Difficulty no   Toileting issues no   Doing Errands Independently Difficulty (such as shopping) yes   Errands Management family drives   Fall history within last six months yes   Number of times patient has fallen within last six months 6  (in last week with new onset of symptoms)   General Information   Onset of Illness/Injury or Date of Surgery 01/03/22   Referring Physician ana maría Christine   Patient/Family Therapy Goals Statement (PT) Pt uncertain about discharge   Pertinent History of Current Problem (include personal factors and/or comorbidities that impact the POC) Prior to admission HPI reviewed with patient: She has been sick for 4 to 5 days with feeling terrible, she has been lightheaded, severe weakness, presyncopal, nausea.    Existing Precautions/Restrictions fall   Weight-Bearing Status - LLE full weight-bearing   Weight-Bearing Status - RLE full weight-bearing   Cognition   Orientation Status (Cognition) oriented x 4   Affect/Mental Status (Cognition) WNL   Follows Commands (Cognition) WFL   Pain Assessment   Patient Currently in Pain Yes, see Vital Sign flowsheet  (10/1 pain L arm)    Integumentary/Edema   Integumentary/Edema Comments tender to touch on lat upper UE L   Range of Motion (ROM)   ROM Comment limited L UE dur to pain, unable to lift more than 90 degrees AROM, PROM to 150 degrees elevation   Strength   Strength Comments decreased L UE strength due to pain, NT   Bed Mobility   Bed Mobility rolling left;supine-sit   Rolling Left [place] (Bed Mobility) verbal cues;contact guard   Supine-Sit [place] (Bed Mobility) contact guard;verbal cues;nonverbal cues (demo/gesture)   Bed Mobility Limitations decreased ability to use arms for pushing/pulling   Impairments Contributing to Impaired Bed Mobility pain;decreased strength   Assistive Device (Bed Mobility) bed rails   Transfers   Transfer Safety Concerns Noted decreased balance during turns;losing balance backward;decreased sequencing ability   Impairments Contributing to Impaired Transfers decreased strength;impaired balance;pain   Transfer Safety Comments sit to stand with no AD with post lean against bed, unable to shift I   Gait/Stairs (Locomotion)   [place] Level (Gait) minimum assist (75% patient effort);verbal cues;nonverbal cues (demo/gesture)   Assistive Device (Gait) walker, front-wheeled   Distance in Feet (Required for LE Total Joints) 200'   Pattern (Gait) step-through   Deviations/Abnormal Patterns (Gait) michael decreased;gait speed decreased;stride length decreased;other (see comments)  (veers to L)   Balance   Balance Comments Pt requires B UE support for standing static and dynamic balance and CGA   Clinical Impression   Criteria for Skilled Therapeutic Intervention yes, treatment indicated   PT Diagnosis (PT) difficulty walking and with bed mob   Influenced by the following impairments pain, decreased ROM, strength, act chester, balance, vision   Functional limitations due to impairments impaired functional mob I and safety   Clinical Presentation Evolving/Changing   Clinical Presentation Rationale 3-5 deficits    Clinical Decision Making (Complexity) moderate complexity   Therapy Frequency (PT) Daily   Predicted Duration of Therapy Intervention (days/wks) 4 days   Planned Therapy Interventions (PT) balance training;bed mobility training;gait training;strengthening;transfer training   Anticipated Equipment Needs at Discharge (PT) walker, rolling   Risk & Benefits of therapy have been explained evaluation/treatment results reviewed;care plan/treatment goals reviewed;risks/benefits reviewed;current/potential barriers reviewed;participants voiced agreement with care plan;participants included;patient   PT Discharge Planning    PT Discharge Recommendation (DC Rec) Transitional Care Facility;home with home care physical therapy;home with assist   PT Rationale for DC Rec Pt is well below baseline for I with mob with no AD, pt with pain in R UE affecting use of FWW, impraired balance with need for increased AD, pt lives alone but does offer she may have someone to stay with her.  Pt would need 24/7 cares currently due to increease fall risk wtih mob, and then would benefit from home PT to cont rehab, otherwise pt would be rec to TCU to progress functional mob I, safety and chester to PLOF   PT Brief overview of current status  Pt min A for amb wtih ', frequently running into objects and veering L, not pushing well with L UE due to pain.   Total Evaluation Time   Total Evaluation Time (Minutes) 15

## 2022-01-04 NOTE — CONSULTS
[facility]    Cardiology Consultation    Date of Admission:  1/3/2022    Assessment & Plan   Connie Longoria is a 61 year old female who was admitted on 1/3/2022. I was asked to see the patient for atrial fibrillation.    Summary:   1.  Paroxsymal atrial fibrillation. New diagnosis. Asymptomatic on presentation. Now in NSR. Suspect this was likely precipitated by her acute illness. On Metoprolol XL 25 mg daily prior to admission.   -  Her [id number] is quite high at 7 (female, CVA, DM, MI, CHF, hypertension).   2.  Hx CAD. S/p CABG in x 4 in 2011 with a LIMA to the LAD, sequential SVG to ramus, the OM and then to the distal circumflex. Nuclear stress test in 2015 without evidence of ischemia.  -  On aspirin, zocor, and Metoprolol XL prior to admission.   -  HS troponin elevated at 280 on admission, trended up to 493. She denies anginal symptoms.  3.  Hx mild ischemic cardiomyopathy. EF 45% by last echo at [facility] in 2015.  -  NTproBNP 2400. She is not on diuretics at home. CXR is clear and she appears euvolemic.  4.  UTI. On antibiotics.   5.  IDDM type II. Last hgba1c in October was 6.4.  6.  CVA. In 2007.  7.  Carotid artery disease s/p  L carotid endarterectomy in 2015.  8.  LAURI on CKD stage III. Baseline creatinine 1.1 - 1.3. Up to 1.9 on admission likely secondary to dehydration.   -  Improving with IVF.    9.  Hx RA.  10.  Hx chronic hepatitis C infection.  11.  Hyperlipidemia.LDL in March of 2021 was 109. On zocor 20 mg daily.   - appears she has not tolerated atorvastatin or rosuvastatin.   12.  Hx positive FOBT in 2020. Per notes, she cannot complete a colonoscopy as she reports she cannot do the prep. Hemoglobin is normal.    Plan:  1.  We discussed atrial fibrillation in detail today. Would recommend continuing Metoprolol XL. This can be increased.  2.  Given her significantly elevated [id number]  score we discussed anticoagulation. She is currently on a heparin drip.  Will look into the cost of DOACs.  3.  F/u echocardiogram results.  4.  She has a mild nonspecific troponin elevation possibly related to her rapid atrial fibrillation. Will trend.  5.  Discussed that she will need to establish with cardiology again as an outpatient following discharge.    Lin Cancino PA-C    Reason for Consult   Reason for consult: I was asked by Dr. Christine to evaluate this patient for atrial fibrillation.    Primary Care Physician   LILY HERNANDEZ    Chief Complaint   Altered mental status    History is obtained from the patient and medical record    History of Present Illness   Connie Longoria is a 61 year old female with a history of insulin dependent DM type II, coronary artery disease s/p CABG x 4 in 2011 with a LIMA to the LAD, sequential SVG to ramus, the OM and then to the distal circumflex, hypertension, obesity, prior CVA in 2007, carotid artery disease s/p L carotid endarterectomy in 2015, CKD, chronic hepatitis C, rheumatoid arthritis, cirrhosis, significant tobacco use, dyslipidemia, and blindness in her left eye who was admitted with altered mental status.    Over the past four days she has been increasingly weak and has developed symptoms of abdominal pain and urinary frequency. She had been vomiting at home with poor oral intake. thus she called EMS. Her blood pressure was initially low in the 80s systolic and she was tachycardic. Her UA in the ED was suggestive of a possible UTI and her procalcitonin was elevated thus she was started on IV antibiotics. Her lactate was elevated. Her creatinine was elevated to 1.95, sodium was low at 126 and her BUN was quite elevated at 77 felt to be secondary to dehydration.    In the ED, she was also noted to be in atrial fibrillation with RVR with HRs in the 150s. She converted in the ED after receiving IV Metoprolol x 2. Her NTproBNP was 2,406. HS troponin was 280. Subsequent troponin was 493. Given her afib and elevated troponin, cardiology  was consulted.    This morning after IV fluids her renal function has improved and her electrolyte abnormalities have resolved. This morning she is feeling better. BPs are stable. She denies any recent anginal symptoms, specifically chest pain or shortness of breath. Her activity is fairly limited at home thought to only her ADLs.     In regards to her CAD history, she underwent CABG in 2011 as noted above with a LIMA to the LAD, sequential SVG to ramus, the OM and then to the distal circumflex. Coronary angiography showed her RCA was chronically occluded but the PDA filled by collaterals to the left system. The RCA was small and nondominant and not graftable.    It appears she has not had regular cardiology follow in the last 5 years. Her last echocardiogram was in 2015 and showed a LVEF of 45%, akinetic basal septal and inferior wall and mild hypokinesis of the basal lateral wall. She had diastolic dysfunction. Severe aortic sclerosis without stenosis was noted. Her last stress test was in 2015 and showed a fixed inferior defect without evidence if ischemia.     Her cardiac medications prior to admission were aspirin 81 mg daily, simvastatin 20 mg daily, and Metoprolol XL 25 mg daily.     Past Medical History    Past Medical History:   Diagnosis Date     Congestive heart failure, unspecified      Coronary artery disease      CVA (cerebral vascular accident) (H)      Diabetes (H)      Gastro-oesophageal reflux disease      Glaucoma      Heart attack (H)      Hyperlipidemia      Hypertension      Renal disease     from diabetes and steroid use     Rheumatoid arthritis (H)      Stented coronary artery      Steroid long-term use      Stroke (cerebrum) (H)      Unspecified cerebral artery occlusion with cerebral infarction        Past Surgical History   Past Surgical History:   Procedure Laterality Date     AS CABG, ARTERY-VEIN, FOUR       CARDIAC SURGERY       COLONOSCOPY       CYCLOPHOTOCOAGULATION TRANSSCLERAL WITH  MICROPULSE LASER Right 4/17/2019    Procedure: Right Eye Micropulse Cyclophotocoagulation;  Surgeon: Bethany William MD;  Location: [facility] OR     EYE SURGERY Left      EYE SURGERY Right 11/08/2017    shunt placement     IMPLANT VALVE EYE Left 9/18/2015    Procedure: IMPLANT VALVE EYE;  Surgeon: Harlan Harris MD;  Location: [facility]     IMPLANT VALVE EYE Left 11/3/2015    Procedure: IMPLANT VALVE EYE;  Surgeon: Harlan Harris MD;  Location: [facility]     REVISE GLAUCOMA SHUNT Left 10/9/2015    Procedure: REVISE GLAUCOMA SHUNT;  Surgeon: Harlan Harris MD;  Location: [facility]     REVISE GLAUCOMA SHUNT Left 2/23/2016    Procedure: REVISE GLAUCOMA SHUNT;  Surgeon: Harlan Harris MD;  Location: [facility]     VASCULAR SURGERY      carodid endarectomy       Prior to Admission Medications   Prior to Admission Medications   Prescriptions Last Dose Informant Patient Reported? Taking?   LISINOPRIL PO Past Week at Unknown time  Yes Yes   Sig: Take 10 mg by mouth daily   VITAMIN D PO Past Week at Unknown time  Yes Yes   Sig: Take 1 tablet by mouth daily OTC: Patient unsure of strength   aspirin 81 MG tablet Past Week at Unknown time  Yes Yes   Sig: Take 81 mg by mouth daily   bimatoprost (LUMIGAN) 0.01 % SOLN Past Week at Unknown time  Yes Yes   Sig: Place 1 drop into the right eye At Bedtime   brimonidine-timolol (COMBIGAN) 0.2-0.5 % ophthalmic solution Past Week at Unknown time  Yes Yes   Sig: Place 1 drop into the right eye 2 times daily   dorzolamide (TRUSOPT) 2 % ophthalmic solution Past Week at Unknown time  Yes Yes   Sig: Place 1 drop into the right eye 2 times daily   gabapentin (NEURONTIN) 600 MG tablet Past Week at Unknown time  Yes Yes   Sig: Take 600 mg by mouth 3 times daily   insulin glargine (LANTUS PEN) 100 UNIT/ML pen Past Week at Unknown time  Yes Yes   Sig: Inject 35 Units Subcutaneous At Bedtime   metoclopramide (REGLAN) 5 MG tablet prn med  Yes Yes   Sig: Take 5 mg by mouth 4 times daily as needed    metoprolol succinate ER (TOPROL-XL) 25 MG 24 hr tablet Past Week at Unknown time  Yes Yes   Sig: Take 25 mg by mouth daily   simvastatin (ZOCOR) 20 MG tablet Past Week at Unknown time  Yes Yes   Sig: Take 20 mg by mouth At Bedtime      Facility-Administered Medications: None     Allergies   Allergies   Allergen Reactions     Ascorbate      Other reaction(s): Angioedema  Mouth swells     Atorvastatin      Denies allergy     Effexor [Venlafaxine] Nausea and Vomiting     PN: LW Reaction: gi upset     Other  [No Clinical Screening - See Comments]      Mouth swells  Other reaction(s): Angioedema  PN: LW Other1: -all berry     Rosuvastatin Other (See Comments)     ALT elevated  Alt elevated  Other reaction(s): Other - Describe In Comment Field  ALT elevated     Wellbutrin [Bupropion] Other (See Comments)     Severe HTN even on meds when on wellbutrin  Other reaction(s): Hypertension       Social History   She lives alone. Her  passed away in the spring of 2021 from Covid. She does have some family nearby. Longstanding history of tobacco use.    Family History   Family History   Problem Relation Age of Onset     Diabetes Mother      Glaucoma Mother      Diabetes Brother      Glaucoma Sister      Diabetes Sister      Hypertension No family hx of      Macular Degeneration No family hx of      Retinal detachment No family hx of        Review of Systems   The 10 point Review of Systems is negative other than noted in the HPI or here.     Physical Exam       BP: 137/77 Pulse: 76   Resp: 19 SpO2: 100 % O2 Device: None (Room air) Oxygen Delivery: 2 LPM  Vital Signs with Ranges  Pulse:  [] 76  Resp:  [7-27] 19  BP: ()/() 137/77  SpO2:  [94 %-100 %] 100 %  176 lbs 12.94 oz    Constitutional: Alert, no acute distress.  Eyes: sclera anicteric  Respiratory: No respiratory distress. Breath sounds are CTAB. No wheezes, rhonchi, or rales   Cardiovascular: Regular rate and rhythm. Normal S1, S2. No murmurs,  rubs, or gallops.   GI: abdomen soft.  Skin: warm and dry.   Musculoskeletal: no LE edema bilaterally. [place]  Neurologic: alert and oriented x 3.  Psychiatric: affect appropriate.     Data   -Data reviewed today: All pertinent laboratory and imaging results from this encounter were reviewed. I personally reviewed the EKG tracing showing atrial fibrillation with RVR.  Recent Labs   Lab 01/04/22  0735 01/04/22  0700 01/04/22  0221 01/03/22  2304 01/03/22  2038   WBC  --  9.9  --   --  11.1*   HGB  --  14.1  --   --  17.2*   MCV  --  93  --   --  93   PLT  --  169  --   --  205   NA  --  134  --   --  126*   POTASSIUM  --  3.4  --   --  4.0   CHLORIDE  --  99  --   --  87*   CO2  --  27  --   --  25   BUN  --  61*  --   --  77*   CR  --  1.56*  --   --  1.95*   ANIONGAP  --  8  --   --  14   THOMAS  --  8.5  --   --  10.6*   * 228* 282*   < > 384*   ALBUMIN  --  3.1*  --   --  3.8   PROTTOTAL  --  7.2  --   --  8.7   BILITOTAL  --  0.5  --   --  0.7   ALKPHOS  --  54  --   --  70   ALT  --  19  --   --  21   AST  --  33  --   --  29   LIPASE  --   --   --   --  81    < > = values in this interval not displayed.       Telemetry NSR, occasional PVCs    Coronary angiography     Imaging:  Recent Results (from the past 24 hour(s))   XR Chest Port 1 View    Narrative    EXAM: XR CHEST PORT 1 VIEW  LOCATION: [facility]  DATE/TIME: 1/3/2022 8:46 PM    INDICATION: Shortness of breath.  COMPARISON: None.      Impression    IMPRESSION: Sternotomy. The 2 most superior sternal wires appear fractured. The lungs are clear. No pneumothorax. Pulmonary vascularity is within normal limits.

## 2022-01-04 NOTE — PLAN OF CARE
VSS. Tele: SR c/ PVC on RA. IVMF stopped after speaking to cardiology. Sliding scale insulin given at meals. Heparin drip continues, next 10 A in AM. PT/OT consulted as pt states she has fallen 4 times at home recently. Pt c/o left upper arm pain MD notified. Waiting on echo. Report given to Nilton GORDON and pt moved to [facility] with all of her belongings.

## 2022-01-04 NOTE — PLAN OF CARE
..Neuro- AxO 4  Tele/Cardiac- SR PVC's  Resp- 1L NC   Activity- Bedrest, 2A W  Pain- Denies  Drips- Hep @ 650  Drains/Tubes- PIV x 3  Skin- WDL   GI/- External cath   Aggression Color- green  COVID status- neg  Plan- Echo, Cards consult, continue inpatient treatment

## 2022-01-04 NOTE — H&P
[facility]    History and Physical - Hospitalist Service       Date of Admission:  1/3/2022    Assessment & Plan      Connie Longoria is a 61 year old female admitted on 1/3/2022. She presents with weakness, urinary frequency, and abd pain.     Generalized weakness  Urinary tract infection  Assessment: Presents with 4-day history of increasing generalized weakness, generalized abdominal pain, and increasing urinary frequency.  Her UA is suggestive of possible infection.  Given her high procalcitonin, she will start on IV ceftriaxone.  She is otherwise hemodynamically stable.  Plan:  -Admit inpatient  -Continue IV ceftriaxone  -IV fluids  -Follow vitals/temp      Atrial fibrillation with RVR (H)    Elevated troponin    Assessment: Was able to be converted to sinus rhythm with metoprolol IV 5 mg x 2.  Suspect her A. fib with RVR is driven by acute illness/urinary tract infection.    Plan:   -Continue prior to admission metoprolol  -Telemetry  -Obtain echocardiogram  -Trend troponins  -Cardiology consulted  -Given absence of chest pain, will hold off heparin infusion      CHF (congestive heart failure) (H)    Type II demand ischemia    Assessment/Plan: Troponin elevated at 280, proBNP of 2406 on admission.  Suspect that her troponin secondary to demand ischemia in setting of atrial fibrillation with RVR.  She is now in sinus rhythm.  She is without any significant anginal symptoms and the suspicion for ACS is low at this time.      Rheumatoid arthritis involving both hands with positive rheumatoid factor (H)    Assessment/Plan: Stable, follow as outpatient      Cerebrovascular accident (CVA) (H)    Assessment/Plan: Stable, no focal deficits on admission.      Chronic GERD    Assessment/Plan: Continue prior to admission Prilosec      Cirrhosis of liver (H)    Hepatitis C, chronic (H)    Assessment/Plan: Stable, no evidence of hepatic encephalopathy, LFTs are preserved.  Continue to follow as  outpatient      Depression    Assessment/Plan: Stable, follows outpatient      Hyponatremia    Renal insufficiency    Assessment/Plan: Creatinine on admission of 1.95 and sodium on admission of 126, suspect secondary to hypovolemia/dehydration.  Will hydrate overnight and recheck BMP      Hypertension    Hyperlipidemia    Assessment/Plan: Stable, continue prior to admission metoprolol/simvastatin      Type 2 diabetes mellitus (H)    Assessment: PTA patient is on 35 units of Lantus at bedtime    Plan:   -Continue prior to admission Lantus  -Sliding scale insulin as needed  -Hypoglycemia protocol       Diet:   Cardiac Diet  DVT Prophylaxis: Heparin gtt  Urias Catheter: Not present  Central Lines: None  Code Status:   FULL CODE    Clinically Significant Risk Factors Present on Admission         # Hyponatremia: Na = 126 mmol/L (Ref range: 133 - 144 mmol/L) on admission, will monitor as appropriate  # Hypercalcemia: Ca = 10.6 mg/dL (Ref range: 8.5 - 10.1 mg/dL) and/or iCa = N/A on admission, will monitor as appropriate     # Platelet Defect: home medication list includes an antiplatelet medication       Disposition Plan   Expected Discharge:    Anticipated discharge location:  Awaiting care coordination huddle  Delays:            The patient's care was discussed with the Patient and ED Provider.    Adan Christine MD  [facility]  Securely message with the [employer] Web Console (learn more here)  Text page via [employer] Paging/Directory        ______________________________________________________________________    Chief Complaint     Weakness, urinary frequency, and abd pain.     History is obtained from the patient    History of Present Illness      Connie Longoria is a 61 year old female with congestive heart failure, CVA, chronic GERD, hypertension, hyperlipidemia, who presents for evaluation of abdominal status.    Patient ports that for the past 4 days, she has had increasing weakness, generalized  abdominal pain, and increasing urinary frequency.  She does have diabetes mellitus and reports that she is not been eating well or using her insulin for the past several days.  She also endorses some nausea and infrequent episodes of nonbloody emesis.  She otherwise denies any fevers or chills, no diarrhea, she denies any chest pain or shortness of breath.  She denies any recent falls or head trauma.  She denies any blurry vision, localized weakness or numbness of her extremities.    Review of Systems      The 10 point Review of Systems is negative other than noted in the HPI or here.     Past Medical History      I have reviewed this patient's medical history and updated it with pertinent information if needed.   Past Medical History:   Diagnosis Date     Congestive heart failure, unspecified      Coronary artery disease      CVA (cerebral vascular accident) (H)      Diabetes (H)      Gastro-oesophageal reflux disease      Glaucoma      Heart attack (H)      Hyperlipidemia      Hypertension      Renal disease     from diabetes and steroid use     Rheumatoid arthritis (H)      Stented coronary artery      Steroid long-term use      Stroke (cerebrum) (H)      Unspecified cerebral artery occlusion with cerebral infarction        Past Surgical History   I have reviewed this patient's surgical history and updated it with pertinent information if needed.  Past Surgical History:   Procedure Laterality Date     AS CABG, ARTERY-VEIN, FOUR       CARDIAC SURGERY       COLONOSCOPY       CYCLOPHOTOCOAGULATION TRANSSCLERAL WITH MICROPULSE LASER Right 4/17/2019    Procedure: Right Eye Micropulse Cyclophotocoagulation;  Surgeon: Bethany William MD;  Location:  OR     EYE SURGERY Left      EYE SURGERY Right 11/08/2017    shunt placement     IMPLANT VALVE EYE Left 9/18/2015    Procedure: IMPLANT VALVE EYE;  Surgeon: Harlan Harris MD;  Location: [facility]     IMPLANT VALVE EYE Left 11/3/2015    Procedure: IMPLANT VALVE EYE;   Surgeon: Harlan Harris MD;  Location: [facility]     REVISE GLAUCOMA SHUNT Left 10/9/2015    Procedure: REVISE GLAUCOMA SHUNT;  Surgeon: Harlan Harris MD;  Location: [facility]     REVISE GLAUCOMA SHUNT Left 2/23/2016    Procedure: REVISE GLAUCOMA SHUNT;  Surgeon: Harlan Harris MD;  Location: [facility]     VASCULAR SURGERY      carodid endarectomy       Social History   I have reviewed this patient's social history and updated it with pertinent information if needed.  Social History     Tobacco Use     Smoking status: Current Every Day Smoker     Packs/day: 0.20     Types: Cigarettes     Smokeless tobacco: Never Used     Tobacco comment: #2 cigs / day   Substance Use Topics     Alcohol use: No     Drug use: No       Family History   I have reviewed this patient's family history and updated it with pertinent information if needed.  Family History   Problem Relation Age of Onset     Diabetes Mother      Glaucoma Mother      Diabetes Brother      Glaucoma Sister      Diabetes Sister      Hypertension No family hx of      Macular Degeneration No family hx of      Retinal detachment No family hx of        Prior to Admission Medications   Prior to Admission Medications   Prescriptions Last Dose Informant Patient Reported? Taking?   Alendronate Sodium (FOSAMAX PO)   Yes No   Sig: Take 70 mg by mouth every 30 days    LISINOPRIL PO   Yes No   Sig: Take 10 mg by mouth daily   METOPROLOL TARTRATE PO   Yes No   Sig: Take 25 mg by mouth daily   Metoclopramide HCl (REGLAN PO)   Yes No   Sig: Take 5 mg by mouth   Omeprazole (PRILOSEC PO)   Yes No   Sig: Take 10 mg by mouth 2 times daily (before meals)   Ondansetron HCl (ZOFRAN PO)   Yes No   Sig: Take 4 mg by mouth   SIMVASTATIN PO   Yes No   Sig: Take 10 mg by mouth At Bedtime   aspirin 81 MG tablet   Yes No   Sig: Take 81 mg by mouth daily   brimonidine (ALPHAGAN) 0.2 % ophthalmic solution   No No   Sig: Place 1 drop into the right eye every 12 hours   dorzolamide-timolol (COSOPT)  2-0.5 % ophthalmic solution   No No   Sig: Place 1 drop into the right eye 2 times daily   gabapentin (NEURONTIN) 400 MG capsule   Yes No   Sig: Take 400 mg by mouth 3 times daily   inFLIXimab (REMICADE) 100 MG injection   Yes No   Sig: once   insulin detemir (LEVEMIR) 100 UNIT/ML injection   Yes No   Sig: Inject 35 Units Subcutaneous At Bedtime   latanoprost (XALATAN) 0.005 % ophthalmic solution   No No   Sig: Place 1 drop into the right eye At Bedtime   methotrexate 2.5 MG tablet   Yes No   Sig: TK 4 TS PO Q WK   ondansetron (ZOFRAN ODT) 4 MG ODT tab   No No   Sig: Take 1 tablet (4 mg) by mouth every 8 hours as needed for nausea   polyethylene glycol (MIRALAX/GLYCOLAX) powder   Yes No   Sig: Take 1 capful by mouth daily   travoprost BAK FREE (TRAVATAN Z) 0.004 % ophthalmic solution   No No   Sig: Place 1 drop into the right eye At Bedtime      Facility-Administered Medications: None     Allergies   Allergies   Allergen Reactions     Ascorbate      Other reaction(s): Angioedema  Mouth swells     Atorvastatin      Denies allergy     Effexor [Venlafaxine] Nausea and Vomiting     PN: LW Reaction: gi upset     Other  [No Clinical Screening - See Comments]      Mouth swells  Other reaction(s): Angioedema  PN: LW Other1: -all berry     Rosuvastatin Other (See Comments)     ALT elevated  Alt elevated  Other reaction(s): Other - Describe In Comment Field  ALT elevated     Wellbutrin [Bupropion] Other (See Comments)     Severe HTN even on meds when on wellbutrin  Other reaction(s): Hypertension       Physical Exam   Vital Signs:     BP: 138/89 Pulse: 67   Resp: 9 SpO2: 94 %      Weight: 176 lbs 12.94 oz    Constitutional: awake, alert, cooperative, no apparent distress.   Eyes: Lids and lashes normal, pupils equal, round and reactive to light   ENT: Normocephalic, without obvious abnormality, atraumatic, sinuses nontender on palpation   Hematologic / Lymphatic: no cervical lymphadenopathy   Respiratory: CTABL    Cardiovascular: RRR with no m/r/g   GI: Normal bowel sounds, soft, non-distended, non-tender. S  kin: normal skin color, texture, turgor   Musculoskeletal: There is no redness, warmth, or swelling of the joints. Full range of motion noted.   Neurologic: Awake, alert, oriented to name, place and time. Cranial nerves II-XII are grossly intact. Motor is 5 out of 5 bilaterally. Sensory is intact.   Neuropsychiatric: normal mood and affect        Data   Data reviewed today: I reviewed all medications, new labs and imaging results over the last 24 hours. I personally reviewed the EKG tracing showing Normal sinus rhythm with non-specific ST changes in inferolateral leads and the chest x-ray image(s) showing see below.    CXR  IMPRESSION: Sternotomy. The 2 most superior sternal wires appear fractured. The lungs are clear. No pneumothorax. Pulmonary vascularity is within normal limits.    Most Recent 3 CBC's:Recent Labs   Lab Test 01/03/22  2038 10/24/19  1340 10/24/19  1042   WBC 11.1* 11.0 10.6   HGB 17.2* 16.3* 15.9*   MCV 93 96 97    173 169     Most Recent 3 BMP's:Recent Labs   Lab Test 01/03/22  2304 01/03/22  2038 10/24/19  1340 10/24/19  1042   NA  --  126* 134 134   POTASSIUM  --  4.0 4.2 4.1   CHLORIDE  --  87* 101 102   CO2  --  25 24 23   BUN  --  77* 26 24   CR  --  1.95* 0.94 0.97   ANIONGAP  --  14 9 9   THOMAS  --  10.6* 10.1 9.8   * 384* 242* 267*     Most Recent 2 LFT's:Recent Labs   Lab Test 01/03/22  2038 10/24/19  1340   AST 29 32   ALT 21 22   ALKPHOS 70 65   BILITOTAL 0.7 0.7     Most Recent 3 INR's:Recent Labs   Lab Test 11/04/17  0642   INR 1.11     Most Recent 3 Troponin's:No lab results found.  Most Recent 3 BNP's:Recent Labs   Lab Test 01/03/22 2038   NTBNPI 2,406*     Recent Results (from the past 24 hour(s))   XR Chest Port 1 View    Narrative    EXAM: XR CHEST PORT 1 VIEW  LOCATION: [facility]  DATE/TIME: 1/3/2022 8:46 PM    INDICATION: Shortness of  breath.  COMPARISON: None.      Impression    IMPRESSION: Sternotomy. The 2 most superior sternal wires appear fractured. The lungs are clear. No pneumothorax. Pulmonary vascularity is within normal limits.

## 2022-01-04 NOTE — CONSULTS
Anticoagulation coverage check.  Patient has Medicare D through [employer] with [employer].    Xarelto/Eliquis:  $0/mo.     Jantoven (warfarin): $0/mo      -LELAND Lowery, Pharmacy Technician/Liaison, Discharge Pharmacy, [phone]

## 2022-01-04 NOTE — PROGRESS NOTES
.RECEIVING UNIT ED HANDOFF REVIEW    ED Nurse Handoff Report was reviewed by: Angelito Shine RN on January 4, 2022 at 12:40 AM

## 2022-01-04 NOTE — ED NOTES
Bed: [facility]  Expected date:   Expected time:   Means of arrival:   Comments:  [id number] DKA/sepsis

## 2022-01-04 NOTE — ED PROVIDER NOTES
History   Chief Complaint:  Altered Mental Status     HPI   Connie Longoria is a 61 year old female, with history of Type 2 diabetes mellitus, CHF, MI, coronary atherosclerosis, Hypertension, CAD, obesity, stroke, and renal disease who presents to the emergency department via EMS with altered mental status. The patient reports taking Aspirin and blood pressure medication. 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 Per EMS, patient is an insulin dependent diabetic who has not been eating well or using her insulin. Her blood glucose level was 331. EMS reports the patient has been ill for four day and is on 2 liters of O2, but she was not hypoxic with EMS. She has had quad bypass. EMS reports her lowest systolic pressure was 80, but increased to 109 once they were in the emergency department garage. They also noted tachycardia. They were also unable to establish an IV line. Per the patient, she describes abdominal pain, cough, and difficulty with ambulation. Additionally, she has had vomiting, and has increased her fluid intake. She has had recent falls, but denies any recent head trauma. She denies any headache, chest pain, fevers, or diarrhea. The patient also denies any history of stroke or abdominal surgeries.     Review of Systems   Constitutional: Negative for fever.   Respiratory: Positive for cough.    Cardiovascular: Negative for chest pain.   Gastrointestinal: Positive for abdominal pain, nausea and vomiting. Negative for diarrhea.   Musculoskeletal: Positive for gait problem.   Neurological: Negative for headaches.   All other systems reviewed and are negative.    Allergies:  Ascorbate  Atorvastatin  Effexor [Venlafaxine]  Rosuvastatin  Wellbutrin [Bupropion]  Venlafaxine Analogues   Ascorbic Acid (Vitamin C)   Tomato (Solanum Lycopersicum)     Medications:  Alendronate Sodium (FOSAMAX PO)  alendronate (FOSAMAX) 70 mg tablet    aspirin (ECOTRIN) 81 mg enteric coated tablet    brimonidine (ALPHAGAN)  0.2 % ophthalmic solution  Calcium citrate-vitamin d 315 mg-200 unit 315-200 mg-unit tablet    Carboxymethylcell-Hypromellose 0.25-0.3 % dlgl    Ciclopirox 0.77 % topical gel    dorzolamide-timolol (COSOPT) 2-0.5 % ophthalmic solution  gabapentin (NEURONTIN) 400 MG capsule  gabapentin (NEURONTIN) 600 mg tablet    inFLIXimab (REMICADE) 100 MG injection  insulin detemir (LEVEMIR) 100 UNIT/ML injection  lancets    latanoprost (XALATAN) 0.005 % ophthalmic solution  lisinopriL (PRINIVIL; ZESTRIL) 10 mg tablet    methotrexate 2.5 MG tablet  metoclopramide HCl (REGLAN) 5 mg tablet    METOPROLOL TARTRATE PO  metoprolol succinate (TOPROL XL) 25 mg Sustained-Release tablet    Omeprazole (PRILOSEC PO)  ondansetron (ZOFRAN ODT) 4 MG ODT tab  Ondansetron HCl (ZOFRAN PO)  polyethylene glycol (MIRALAX/GLYCOLAX) powder  pantoprazole (PROTONIX) 20 mg tablet    simvastatin (ZOCOR) 20 mg tablet    travoprost AKILAH FREE (TRAVATAN Z) 0.004 % ophthalmic solution  TRAVATAN Z 0.004 % ophthalmic solution       Past Medical History:     Acute cystitis without hematuria  Adrenal insufficiency  LAURI (acute kidney injury)  Anasarca  Astigmatism of both eyes with presbyopia   Bilateral hip pain  Bilateral hand pain  Blurred vision, right eye  Controlled type 2 diabetes mellitus with complication, with long-term current use of insulin   Carotid artery stenosis  Cerebrovascular accident (CVA)   Congestive heart failure, unspecified  Coronary artery disease   Chronic GERD  Cirrhosis of liver   Chronic kidney disease   Carotid artery stenosis   Depression  Diabetic gastroparesis   Diabetic macular edema of both eyes  Exposure to COVID-19 virus  Elevated troponin  Fecal occult blood test positive  Fibromyalgia  Glaucoma  Gastro-oesophageal reflux disease  Health care home, active care coordination  Hepatitis C without hepatic coma  Hypokalemia  Hyponatremia  Hypertension   Hyperlipidemia   Iridocyclitis of left eye  Long term current use of systemic  steroids  Low TSH level   Moderate mitral regurgitation by prior echocardiogram  Moderate protein malnutrition   Myocardial infarction  Nonproliferative diabetic retinopathy of both eyes   Neovascular glaucoma of both eyes, severe stage    Pleural effusion   Peripheral neuropathy  Pseudophakia of both eyes  Pain in joint, multiple sites  Obesity (BMI 30.0-34.9)   OP (osteoporosis)  Peripheral neuropathy  Presbyopia  Recurrent major depression   Rheumatoid arthritis involving both hands with positive  Renal disease  Rheumatoid factor  Thrombocytopenia   Tobacco dependence  Trigger finger, acquired  Urinary incontinence  Unspecified cerebral artery occlusion with cerebral infarction  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  Vitamin D deficiency   Vision loss of left eye  Anemia  Coronary atherosclerosis  Myopia  Nausea and vomiting  Sepsis due to urinary tract infection   Stented coronary artery   Stroke (cerebrum)  Tobacco abuse    Past Surgical History:    Coronary artery bypass graft  Tubal ligation   Thoracentesis   Carotid endardectomy  Cataract removal  Procoagulation with laser   Retinal laser procedure  Glaucoma valve implant  AS CABG, Artery-vein, four  Cardiac surgery  Colonoscopy  Cyclophotocoagulation transscleral with micropulse laser   Eye surgery x2  Implant valve eye x2  Revise glaucoma shunt x2   Vascular surgery     Family History:    Diarrhea (mother, brother, sister)   Glaucoma (mother, sister)     Physical Exam     Patient Vitals for the past 24 hrs:   BP Pulse Resp SpO2 Weight   01/03/22 2245 138/89 67 9 -- --   01/03/22 2235 (!) 125/93 (!) 125 14 -- --   01/03/22 2230 (!) 147/113 (!) 135 17 -- --   01/03/22 2215 (!) 131/114 (!) 147 22 -- --   01/03/22 2200 109/83 (!) 163 27 -- 80.2 kg (176 lb 12.9 oz)   01/03/22 2145 (!) 118/111 (!) 142 17 -- --   01/03/22 2130 (!) 121/105 (!) 149 19 94 % --   01/03/22 2115 103/83 (!) 151 14 100 % --   01/03/22 2103 97/83 (!) 163 15 100 % --   01/03/22 2045  -- (!) 160 12 100 % --   01/03/22 2024 (!) 131/99 (!) 161 -- -- --       Physical Exam  GENERAL: well developed, pleasant  HEAD: atraumatic  EYES: pupils reactive, extraocular muscles intact, conjunctivae normal  ENT:  mucus membranes moist  NECK:  trachea midline, normal range of motion  RESPIRATORY: no tachypnea, breath sounds clear to auscultation   CVS: normal S1/S2, no murmurs, intact distal pulses.Tachycardic rate.    ABDOMEN: soft, nondistention. Mild, mid upper abdominal pain  MUSCULOSKELETAL: no deformities  SKIN: warm and dry, no acute rashes or ulceration  NEURO: GCS 15, cranial nerves intact, alert and oriented x3. Normal straight leg raises. Normal  strength.    PSYCH:  Mood/affect normal    Emergency Department Course   ECG    ECG #1:   ECG obtained at 2047, ECG read at 2055  Atrial flutter with variable AV block  Incomplete left bundle branch block  Marked ST abnormality, possible lateral subendocardial injury  Abnormal ECG  Changes noted above as compared to prior, dated 01/27/2016  Sinus rhythm with frequent ventricular premature complexes  Possible inferior myocardial infarction  Abnormal rhythm ECG  Rate 155 bpm. IN interval * ms. QRS duration 112 ms. QT/QTc 290/465 ms. P-R-T axes * 66 187.     ECG #2:   ECG obtained at 2250, ECG read at 2254  Normal sinus rhythm    Possible Left atrial enlargement  ST & T wave abnormality, consider inferolateral ischemia   Abnormal ECG    Rate 67 bpm. IN interval 160 ms. QRS duration 104 ms. QT/QTc 442/467 ms. P-R-T axes 57 70 180.    Imaging:  XR Chest Port 1 View   Final Result   IMPRESSION: Sternotomy. The 2 most superior sternal wires appear fractured. The lungs are clear. No pneumothorax. Pulmonary vascularity is within normal limits.      Echocardiogram Complete    (Results Pending)     Report per radiology    Laboratory:  Labs Ordered and Resulted from Time of ED Arrival to Time of ED Departure   COMPREHENSIVE METABOLIC PANEL - Abnormal       Result  Value    Sodium 126 (*)     Potassium 4.0      Chloride 87 (*)     Carbon Dioxide (CO2) 25      Anion Gap 14      Urea Nitrogen 77 (*)     Creatinine 1.95 (*)     Calcium 10.6 (*)     Glucose 384 (*)     Alkaline Phosphatase 70      AST 29      ALT 21      Protein Total 8.7      Albumin 3.8      Bilirubin Total 0.7      GFR Estimate 28 (*)    LACTIC ACID WHOLE BLOOD - Abnormal    Lactic Acid 2.7 (*)    TROPONIN I - Abnormal    Troponin I High Sensitivity 280 (*)    NT PROBNP INPATIENT - Abnormal    N terminal Pro BNP Inpatient 2,406 (*)    ROUTINE UA WITH MICROSCOPIC REFLEX TO CULTURE - Abnormal    Color Urine Straw      Appearance Urine Slightly Cloudy (*)     Glucose Urine >=1000 (*)     Bilirubin Urine Negative      Ketones Urine 10  (*)     Specific Gravity Urine 1.016      Blood Urine Small (*)     pH Urine 5.5      Protein Albumin Urine 30  (*)     Urobilinogen Urine Normal      Nitrite Urine Negative      Leukocyte Esterase Urine Moderate (*)     WBC Clumps Urine Present (*)     Mucus Urine Present (*)     RBC Urine 3 (*)     WBC Urine 42 (*)     Squamous Epithelials Urine 1     KETONE BETA-HYDROXYBUTYRATE QUANTITATIVE, RAPID - Abnormal    Ketone (Beta-Hydroxybutyrate) Quantitative 1.4 (*)    CBC WITH PLATELETS AND DIFFERENTIAL - Abnormal    WBC Count 11.1 (*)     RBC Count 5.41 (*)     Hemoglobin 17.2 (*)     Hematocrit 50.1 (*)     MCV 93      MCH 31.8      MCHC 34.3      RDW 12.5      Platelet Count 205      % Neutrophils 57      % Lymphocytes 32      % Monocytes 10      % Eosinophils 0      % Basophils 0      % Immature Granulocytes 1      NRBCs per 100 WBC 0      Absolute Neutrophils 6.3      Absolute Lymphocytes 3.5      Absolute Monocytes 1.1      Absolute Eosinophils 0.1      Absolute Basophils 0.1      Absolute Immature Granulocytes 0.1      Absolute NRBCs 0.0     ISTAT GASES LACTATE VENOUS POCT - Abnormal    Lactic Acid POCT 2.9 (*)     Bicarbonate Venous POCT 27      O2 Sat, Venous POCT 67 (*)      pCO2V Venous POCT 43      pH Venous POCT 7.41      pO2 Venous POCT 35     PROCALCITONIN - Abnormal    Procalcitonin 0.34 (*)    MAGNESIUM - Abnormal    Magnesium 2.6 (*)    GLUCOSE BY METER - Abnormal    GLUCOSE BY METER POCT 349 (*)    HEMOGLOBIN A1C - Abnormal    Hemoglobin A1C 6.4 (*)    LIPASE - Normal    Lipase 81     INFLUENZA A/B & SARS-COV2 PCR MULTIPLEX - Normal    Influenza A PCR Negative      Influenza B PCR Negative      SARS CoV2 PCR Negative     LACTIC ACID WHOLE BLOOD - Normal    Lactic Acid 2.0     BLOOD GAS VENOUS WITH OXYHEMOGLOBIN   GLUCOSE MONITOR NURSING POCT   GLUCOSE MONITOR NURSING POCT   GLUCOSE MONITOR NURSING POCT   GLUCOSE MONITOR NURSING POCT   GLUCOSE MONITOR NURSING POCT   GLUCOSE MONITOR NURSING POCT   BLOOD CULTURE   BLOOD CULTURE   URINE CULTURE        Emergency Department Course:    Reviewed:  I reviewed nursing notes, vitals, past medical history, Care Everywhere and [facility]    Assessments:  2032 The patient was brought to stabilization room 2 by EMS. I obtained history and examined the patient as noted above.  2246 I rechecked the patient and explained findings.     Consults:  na    Interventions:  Medications   0.9% sodium chloride BOLUS (1,000 mLs Intravenous New Bag 1/3/22 2041)     Followed by   sodium chloride 0.9% infusion (has no administration in time range)   0.9% sodium chloride BOLUS (1,000 mLs Intravenous New Bag 1/3/22 2157)     Followed by   sodium chloride 0.9% infusion (has no administration in time range)   metoprolol (LOPRESSOR) injection 5 mg (5 mg Intravenous Given 1/3/22 2240)   gabapentin (NEURONTIN) capsule 600 mg (has no administration in time range)   insulin glargine (LANTUS PEN) injection 35 Units (has no administration in time range)   metoprolol succinate ER (TOPROL-XL) 24 hr tablet 25 mg (has no administration in time range)   ondansetron (ZOFRAN-ODT) ODT tab 4 mg (has no administration in time range)     Or   ondansetron (ZOFRAN) injection 4 mg (has no  administration in time range)   lactated ringers infusion (has no administration in time range)   cefTRIAXone (ROCEPHIN) 1 g vial to attach to  mL bag for ADULTS or NS 50 mL bag for PEDS (has no administration in time range)   aspirin EC tablet 81 mg (has no administration in time range)   heparin infusion 25,000 units in D5W 250 mL ANTICOAGULANT (has no administration in time range)   glucose gel 15-30 g (has no administration in time range)     Or   dextrose 50 % injection 25-50 mL (has no administration in time range)     Or   glucagon injection 1 mg (has no administration in time range)   insulin aspart (NovoLOG) injection (RAPID ACTING) (has no administration in time range)   insulin aspart (NovoLOG) injection (RAPID ACTING) (has no administration in time range)   brimonidine-timolol (COMBIGAN) 0.2-0.5 % ophthalmic solution 1 drop (has no administration in time range)   dorzolamide (TRUSOPT) 2 % ophthalmic solution 1 drop (has no administration in time range)   bimatoprost (LUMIGAN) 0.01 % ophthalmic drops 1 drop (has no administration in time range)   cefTRIAXone (ROCEPHIN) 2 g vial to attach to  ml bag for ADULTS or NS 50 ml bag for PEDS (2 g Intravenous New Bag 1/3/22 2156)   heparin loading dose for LOW INTENSITY TREATMENT * Give BEFORE starting heparin infusion (4,800 Units Intravenous Given 1/3/22 2239)     Disposition:  The patient was admitted to the hospital under the care of Dr. Adan Rogel    Impression & Plan     CMS Diagnoses:   The Lactic acid level is elevated due to dehydration, and suspected UTI/sepsis, at this time there is no sign of severe sepsis or septic shock., The patient has signs of Severe Sepsis        If one the following conditions is present, a 30 mL/kg bolus is recommended as part of the 6 hour bundle (IBW can be used for BMI >30, or document refusal/contraindication):      1.   Initial hypotension  defined as 2 bps < 90 or map < 65 in the 6hrs before or 6hrs after time  zero.     2.  Lactate >4.     The patient has signs of Severe Sepsis as evidenced by:    1. 2 SIRS criteria, AND  2. Suspected infection, AND   3. Organ dysfunction: Lactic Acidosis with value >2.0    Time severe sepsis diagnosis confirmed: 2046 01/04/22 as this was the time when Lactate resulted, and the level was > 2.0    3 Hour Severe Sepsis Bundle Completion:    1. Initial Lactic Acid Result:   Recent Labs   Lab Test 01/03/22  2341 01/03/22 2046 01/03/22 2038   LACT 2.0 2.9* 2.7*     2. Blood Cultures before Antibiotics: Yes  3. Broad Spectrum Antibiotics Administered:  yes       Anti-infectives (From admission through now)    Start     Dose/Rate Route Frequency Ordered Stop    01/03/22 2125  cefTRIAXone (ROCEPHIN) 2 g vial to attach to  ml bag for ADULTS or NS 50 ml bag for PEDS         2 g  over 30 Minutes Intravenous ONCE 01/03/22 2124 01/04/22 0045          4. Fluid volume administered in ED:  Full bolus NOT administered due to CHF and acute Pulmonary Edema    BMI Readings from Last 1 Encounters:   01/03/22 30.35 kg/m      30 mL/kg fluids based on weight: 2,410 mL  30 mL/kg fluids based on IBW (must be >= 60 inches tall): Patient ideal weight not available.                Severe Sepsis reassessment:  1. Repeat Lactic Acid Level: 2  2. MAP>65 after initial IVF bolus, will continue to monitor fluid status and vital signs      and None      Medical Decision Making:  Patient presents with not feeling well for 4 days, urinary symptoms and vomiting.  Serial abdominal exams are benign.  Broad differential considered; UTI, pyelonephritis, obstructing stone, DKA, pneumonia, ACS, Covid, CHF, Afib, amongst others.     Patient has afib with RVR but also notes vomiting and decreased oral intake.  She was a hard IV start.  After IV was established she had labs drawn and IV fluids started.  One of her 2 IVs had to be restarted.  She had subtle improvement with fluids in terms of BP and heart rate, but looked to  have on going afib, so was given lopressor 5 mg x 2 which converted her into sinus.  Her troponin was mildly elevated and given afib with RVR and troponin leak, heparin was ordered.      Blood culture x 2 were obtained, lactic acid was elevated, although chest xray is normal.  Urine is pending and is expected to be abnormal.  She was given Rocephin empirically given the urinary symptoms, elevated lactic acid and pro calcitonin.     Her blood sugar is elevated however she is not acidotic to suggest DKA, so was given fluids and held on insulin and waiting response to fluids.    I spoke with hospitalist about admission.  Requested straight cath for urine as she hasn't been able to urinate yet.    Her creatinine has bumped up from baseline, likely secondary to dehydration and should improve with fluids.    Her BNP is elevated, however, her pulmonary exam and chest xray are clear, I suspect some chronic degree of elevation and this isn't part of her problem today, however with the Afib with RVR her volume status will need to be monitored continually.    Patient's vital signs improved with the above measures and was moved out to a main room.      Critical Care Time: was 75 minutes for this patient excluding procedures    Diagnosis:    ICD-10-CM    1. Atrial fibrillation with RVR (H)  I48.91    2. Dehydration  E86.0    3. Renal insufficiency  N28.9    4. Hyperglycemia  R73.9    5. Elevated troponin  R77.8    6. Hyponatremia  E87.1      Scribe Disclosure:  IGladis, am serving as a scribe at 8:32 PM on 1/3/2022 to document services personally performed by Bunny Overton MD based on my observations and the provider's statements to me.     IKimberley, am serving as a scribe at 12:25 AM on 1/4/2022 to document services personally performed by Bunny Overton MD based on my observations and the provider's statements to me.          Bunny Overton MD  01/04/22 0143

## 2022-01-04 NOTE — ED TRIAGE NOTES
Pt presents via EMS for eval of not feeling well for 4 days. Pt reports weakness, urinary frequency, and abd pain. Pt is Diabetic.

## 2022-01-04 NOTE — PROGRESS NOTES
[facility] [place]    Medicine Progress Note - Hospitalist Service       Date of Admission: 1/3/2022      PROGRESS NOTE    SUBJECTIVE  Patient new to me, chart reviewed in detail  Prior to admission HPI reviewed with patient: She has been sick for 4 to 5 days with feeling terrible, she has been lightheaded, severe weakness, presyncopal, nausea.  She tried taking more fluids but it did not help.  She has some queasiness in her abdomen.  She absolutely denied any dysuria, pyuria, hematuria or frequency.  No fever chills or rigors.     Since coming to the hospital she is feeling a lot better.  Currently feels like she is almost back to normal.  Denies any trouble breathing.     Remaining ROS: Neg    OBJECTIVE  Vital Signs with Ranges  Pulse:  [] 69  Resp:  [7-27] 14  BP: ()/() 133/90  SpO2:  [94 %-100 %] 100 %  No intake/output data recorded.  BP (!) 133/90   Pulse 69   Resp 14   Wt 80.2 kg (176 lb 12.9 oz)   SpO2 100%   BMI 30.35 kg/m    Wt Readings from Last 2 Encounters:   01/03/22 80.2 kg (176 lb 12.9 oz)   10/24/19 80.7 kg (178 lb)   No data recorded.  Body mass index is 30.35 kg/m .    PHYSICAL EXAM:  GA: Pt is alert and oriented x3 NAD, pleasant and conversive, speaking in full sentances  HEENT: AT/NC, EOMI, PRRLA R , Sclera non-icteric, dry mm.   NECK: supple, No LAD, no thyromegaly  RS: CTAB, good air movement, decreased BS at bases.   CVS: RRR, Nml S1/S2, no g/r, pulses 2+ B symmetrical, no edema, JVP: flat, systolic murmur.   GI: ND/NT, soft, BS + throughout, no masses or HSM  MSL: without deformity, normal range of motion  SKIN: Warm, dry, no rashes  CNS: AO X 3, CNII-XII intact, motor normal strength 5/5 and tone, without any focal deficits,   EXTR: Moves all extremities, no clubbing, cyanosis, or edema    PROCEDURES:    IMAGING:  CXR  IMPRESSION: Sternotomy. The 2 most superior sternal wires appear fractured. The lungs are clear. No pneumothorax. Pulmonary  vascularity is within normal limits.    Echo  Pending.     EKG:  Afib with RVR    ASSESSMENT: Connie Longoria is a 61 year old female with h/o RA, GERD, CAD, s/p CABG, Chronic HFrEF, presented with generalized weakness, no taste, severe nausea, presyncopal. She was found to be in Afib with RVR, new ARF with BUN/Cr of 77/1.95 (c/w poor renal perfusion). Significant recovery overnight with starting metoprolol and her flipping into NSR. Dirty UA found, but no true symptoms suggestive of UTI.     PLAN:    1. Afib with RVR: New onset Afib, pt could not feel any palpitations / asymptomatic. Only constitutional symptoms of hypoperfusion / low BP. Check echo / TSH. Continue metoprolol. Stroke ppx with heparin gtt started, CHADsVasc2 score is 7. Consult pharmacy to find cost of NOACs. Cards consulted. Defer to them about outpatient f/u, need for EPS / RFA, needs ECV if pt flips back in NSR. At this point, would not be safe for ECV due to prolonged period of Afib.   2. Generalized weakness:  Probably due to Afib with RVR (has signs of end organ damage with ATN, presycope / falls, sleepiness, etc). PT to eval.   3. Suspected UTI: When I interviewed pt on 1/4, denied any dysuria for urinary symptoms for the last few weeks. Was surprised that we were suspecting UTI. UA is abnormal for LE (WBC present in UA from ATN), but nitr Neg. Clinically doubt UTI, await urine cultures and stop ABX. Pt ok with that plan.   4. Mild troponin leak: Due to Afib with RVR. Defer to cards about ischemia evaluation. Can be done as outpatient.   5. ARF: Baseline Cr 0.9, Presented with BUN/Cr of 77/1.95 suggestive of pre-renal / ATN caused by Afib with RVR. Treat with IVF / treating Afib with RVR. BUN/Cr already improved to 61/1.56. Avoid any new nephrotoxins (contrast, NSAIDs, ACEi/ARB, etc). Continue to hold Lisinoprill even if we find low EF for now. Start after Cr back to normal.   6. Hyponatremia: Due to dehydration, resolved with IVF.   7. DM2:  HgbA1C 6.4, Cont PTA Lantus. + sliding scale insulin. No sliding scale insulin at dc.   8. CAD: s/p CABG X 4. Cont ASA, BB.   9. Chronic conditions:   a. RA: No DMARDs, no acute flare.   b. GERD:  Cont PTA Prilosec   c. H/o stroke: Per chart. Pt will be on NOAC.   d. Chronic Hep C / Cirrhosis: No interventions.   e. Depression: No interventions.   f. Diabetic retinopathy / Blind L eye: cont eye drops.   g. Diabetic neuropathy: Cont PTA neurontin.   h. Obesity: No interventions.   10. Code status: Full Code  11. FEN:  a. IVF for ARF. Clinically absolutely no evidence of pulmonary edema in spite of elevated BNP (elevated BNP due to cardiac stress from Afib with RVR).   b. Diabetic / Coronary Diet.   c. Lytes stable.   12. Access:PIV  13. Routine cares: GI: Prilosec, DVT: Full AC for Afib.   14. PTA meds: reconciled.   15. Family communication: none.   16. Dispo: Can dc home after 1. Cleared by cardiology, 2. Renal failure resolved, 3. Lives alone, can ambulate independently and be safe at home. Does not need ICU status, can transfer to any cardiac floor. Anticipate dc on 1/5.       The patient's care was discussed with the charge nurse .    Total Time: Spent > 45 min taking care of patient with more than 50% of time spent coordinating care, discussing her atrial fibrillation / treatment plan / h/o stroke and need for AC, discussing coumadin vs NOAC.     Stewart Galvan MD  Hospitalist Service  [facility]  Securely message with the Vocera Web Console (learn more here)  Text page via [employer] Paging/Directory

## 2022-01-05 ENCOUNTER — APPOINTMENT (OUTPATIENT)
Dept: PHYSICAL THERAPY | Facility: CLINIC | Age: 62
DRG: 308 | End: 2022-01-05
Payer: MEDICARE

## 2022-01-05 LAB
ANION GAP SERPL CALCULATED.3IONS-SCNC: 5 MMOL/L (ref 3–14)
BACTERIA UR CULT: ABNORMAL
BUN SERPL-MCNC: 42 MG/DL (ref 7–30)
CALCIUM SERPL-MCNC: 9 MG/DL (ref 8.5–10.1)
CHLORIDE BLD-SCNC: 103 MMOL/L (ref 94–109)
CO2 SERPL-SCNC: 27 MMOL/L (ref 20–32)
CREAT SERPL-MCNC: 1.26 MG/DL (ref 0.52–1.04)
GFR SERPL CREATININE-BSD FRML MDRD: 48 ML/MIN/1.73M2
GLUCOSE BLD-MCNC: 137 MG/DL (ref 70–99)
GLUCOSE BLDC GLUCOMTR-MCNC: 130 MG/DL (ref 70–99)
GLUCOSE BLDC GLUCOMTR-MCNC: 156 MG/DL (ref 70–99)
GLUCOSE BLDC GLUCOMTR-MCNC: 239 MG/DL (ref 70–99)
GLUCOSE BLDC GLUCOMTR-MCNC: 243 MG/DL (ref 70–99)
GLUCOSE BLDC GLUCOMTR-MCNC: 255 MG/DL (ref 70–99)
POTASSIUM BLD-SCNC: 3.2 MMOL/L (ref 3.4–5.3)
SODIUM SERPL-SCNC: 135 MMOL/L (ref 133–144)

## 2022-01-05 PROCEDURE — 36415 COLL VENOUS BLD VENIPUNCTURE: CPT | Performed by: INTERNAL MEDICINE

## 2022-01-05 PROCEDURE — 97530 THERAPEUTIC ACTIVITIES: CPT | Mod: GP

## 2022-01-05 PROCEDURE — 250N000013 HC RX MED GY IP 250 OP 250 PS 637: Performed by: STUDENT IN AN ORGANIZED HEALTH CARE EDUCATION/TRAINING PROGRAM

## 2022-01-05 PROCEDURE — 250N000013 HC RX MED GY IP 250 OP 250 PS 637: Performed by: INTERNAL MEDICINE

## 2022-01-05 PROCEDURE — 97116 GAIT TRAINING THERAPY: CPT | Mod: GP

## 2022-01-05 PROCEDURE — 99239 HOSP IP/OBS DSCHRG MGMT >30: CPT | Performed by: INTERNAL MEDICINE

## 2022-01-05 PROCEDURE — 99232 SBSQ HOSP IP/OBS MODERATE 35: CPT | Mod: 25 | Performed by: INTERNAL MEDICINE

## 2022-01-05 PROCEDURE — 250N000011 HC RX IP 250 OP 636: Performed by: STUDENT IN AN ORGANIZED HEALTH CARE EDUCATION/TRAINING PROGRAM

## 2022-01-05 PROCEDURE — 82310 ASSAY OF CALCIUM: CPT | Performed by: INTERNAL MEDICINE

## 2022-01-05 PROCEDURE — 210N000002 HC R&B HEART CARE

## 2022-01-05 RX ORDER — CITALOPRAM HYDROBROMIDE 10 MG/1
10 TABLET ORAL DAILY
Qty: 30 TABLET | Refills: 0 | Status: SHIPPED | OUTPATIENT
Start: 2022-01-05 | End: 2022-01-14

## 2022-01-05 RX ADMIN — APIXABAN 5 MG: 5 TABLET, FILM COATED ORAL at 09:28

## 2022-01-05 RX ADMIN — GABAPENTIN 600 MG: 300 CAPSULE ORAL at 20:45

## 2022-01-05 RX ADMIN — METOPROLOL SUCCINATE 25 MG: 25 TABLET, EXTENDED RELEASE ORAL at 09:27

## 2022-01-05 RX ADMIN — BRIMONIDINE TARTRATE, TIMOLOL MALEATE 1 DROP: 2; 5 SOLUTION/ DROPS OPHTHALMIC at 20:46

## 2022-01-05 RX ADMIN — CEFTRIAXONE SODIUM 1 G: 1 INJECTION, POWDER, FOR SOLUTION INTRAMUSCULAR; INTRAVENOUS at 20:46

## 2022-01-05 RX ADMIN — BRIMONIDINE TARTRATE, TIMOLOL MALEATE 1 DROP: 2; 5 SOLUTION/ DROPS OPHTHALMIC at 09:28

## 2022-01-05 RX ADMIN — GABAPENTIN 600 MG: 300 CAPSULE ORAL at 09:28

## 2022-01-05 RX ADMIN — INSULIN GLARGINE 35 UNITS: 100 INJECTION, SOLUTION SUBCUTANEOUS at 21:48

## 2022-01-05 RX ADMIN — GABAPENTIN 600 MG: 300 CAPSULE ORAL at 16:36

## 2022-01-05 RX ADMIN — ACETAMINOPHEN 650 MG: 325 TABLET, FILM COATED ORAL at 20:45

## 2022-01-05 RX ADMIN — INSULIN ASPART 2 UNITS: 100 INJECTION, SOLUTION INTRAVENOUS; SUBCUTANEOUS at 21:48

## 2022-01-05 RX ADMIN — BIMATOPROST 1 DROP: 0.1 SOLUTION/ DROPS OPHTHALMIC at 21:47

## 2022-01-05 RX ADMIN — DORZOLAMIDE HYDROCHLORIDE 1 DROP: 20 SOLUTION/ DROPS OPHTHALMIC at 20:46

## 2022-01-05 RX ADMIN — DORZOLAMIDE HYDROCHLORIDE 1 DROP: 20 SOLUTION/ DROPS OPHTHALMIC at 09:28

## 2022-01-05 RX ADMIN — ASPIRIN 81 MG: 81 TABLET, COATED ORAL at 09:28

## 2022-01-05 RX ADMIN — SIMVASTATIN 10 MG: 10 TABLET, FILM COATED ORAL at 20:45

## 2022-01-05 RX ADMIN — APIXABAN 5 MG: 5 TABLET, FILM COATED ORAL at 20:45

## 2022-01-05 ASSESSMENT — ACTIVITIES OF DAILY LIVING (ADL)
ADLS_ACUITY_SCORE: 13
DEPENDENT_IADLS:: TRANSPORTATION
ADLS_ACUITY_SCORE: 13

## 2022-01-05 ASSESSMENT — MIFFLIN-ST. JEOR: SCORE: 1370.09

## 2022-01-05 NOTE — PROGRESS NOTES
"SPIRITUAL HEALTH SERVICES  SPIRITUAL ASSESSMENT Progress Note  [facility]     PRIMARY FOCUS:   Emotional/spiritual/Yazidi distress  Support for coping    REFERRAL SOURCE: Patient verbal request    ILLNESS CIRCUMSTANCES:   Reviewed documentation. Reflective conversation shared with Connie which integrated elements of illness and family narratives.   Resources for Support - Uatsdin kristin    DISTRESS:   Emotional/Spiritual/Existential Distress - Connie was tearful as she shared regarding her grief following the death of her  (March 2021) and two brothers, all within 6 months of each other. \"It's too much.\"  Shinto Distress - Not named  Social/Cultural/Economic Distress - Connie shared that \"I don't have family that support me.\" Expressing both sadness and frustration.    SPIRITUAL/Anglican COPING:   Jainism/Kristin - Uatsdin   Spiritual Practice(s) - Prayer, reading scripture  Emotional/Relational/Existential Connections - not named    GOALS OF CARE:  Goals of Care - She expressed gratitude about going to a TCU \"so that I can get support and get stronger.\"    I offered spiritual and emotional support through reflective listening that affirmed emotions, experience, and meaning. Offered assurance through prayer and scripture reading which incorporated conversational themes.    PLAN: Delivered a bible, Daily Reflections booklet, and sukhwinder cross. Will follow up in 1-2 days.    Misti Rojas  Associate   Pager [phone]  Phone [phone]    "

## 2022-01-05 NOTE — CONSULTS
Care Management Initial Consult    General Information  Assessment completed with: Patient,    Type of CM/SW Visit: Initial Assessment    Primary Care Provider verified and updated as needed: Yes   Readmission within the last 30 days: no previous admission in last 30 days      Reason for Consult: discharge planning  Advance Care Planning: Advance Care Planning Reviewed: no concerns identified          Communication Assessment  Patient's communication style: spoken language (English or Bilingual)    Hearing Difficulty or Deaf: no   Wear Glasses or Blind: yes    Cognitive  Cognitive/Neuro/Behavioral: WDL                      Living Environment:   People in home: alone     Current living Arrangements: apartment      Able to return to prior arrangements: no       Family/Social Support:  Care provided by: self  Provides care for: no one  Marital Status: Single  Children          Description of Support System: Supportive,Involved    Support Assessment: Lacks necessary supervision and assistance    Current Resources:   Patient receiving home care services: No     Community Resources: Transportation Services  Equipment currently used at home: cane, straight,grab bar, tub/shower,grab bar, toilet,raised toilet seat  Supplies currently used at home:      Employment/Financial:  Employment Status: retired        Financial Concerns: No concerns identified   Referral to Financial Counselor: No       Lifestyle & Psychosocial Needs:  Social Determinants of Health     Tobacco Use: Not on file   Alcohol Use: Not on file   Financial Resource Strain: Not on file   Food Insecurity: Not on file   Transportation Needs: Not on file   Physical Activity: Not on file   Stress: Not on file   Social Connections: Not on file   Intimate Partner Violence: Not on file   Depression: Not at risk     PHQ-2 Score: 0   Housing Stability: Not on file       Functional Status:  Prior to admission patient needed assistance:   Dependent ADLs::  "Ambulation-cane  Dependent IADLs:: Transportation       Mental Health Status:  Mental Health Status: No Current Concerns       Chemical Dependency Status:  Chemical Dependency Status: No Current Concerns             Values/Beliefs:  Spiritual, Cultural Beliefs, Anabaptist Practices, Values that affect care: no               Additional Information:  Patient resides in apartment alone with an elevator and no stairs to navigate.  Patient uses a cane at baseline and her son lives approximate 30 min. Away.  Patient does not have anyone that can assist/supervise 24/7 and after speaking with her son is agreeable to TCU.  Patient prefers one near her home and that is \"clean and neat.\"  SW to send referrals to TCU's near/in [place].    Sherly Caraballo RN  Care Coordinator  [facility]  [phone] (text or call)        "

## 2022-01-05 NOTE — PLAN OF CARE
Admit on 1/3 with new onset Afib RVR  Neuro: A+O x4   CV/Rhythm: NSR  Resp/02: On RA, WNL  GI/Diet: WNL  : WNL, stress incontinence   Skin/Incisions/Sites: WNL  Pulses/CMS: intact  Edema: none noted   Activity/Falls Risk: Ax1 with BG and walker  Lines/Drains/IVs: PIV L FA and hand, SL  VS/Pain: VSS, shoulder pain from a fall PTA, given tylenol at bedtime   DC Plan: possibly today pending cardiology, and renal function

## 2022-01-05 NOTE — PROVIDER NOTIFICATION
MD Notification    Notified Person: on MD    Notification Date/Time: 1/4/2022 8:01 PM     Notification Interaction: page    Purpose of Notification: pt complaining of shoulder pain r/t fall PTA (xray negative), asking for Tylenol, can you order please?    Orders Received: medication ordered

## 2022-01-05 NOTE — PLAN OF CARE
OT: orders received and chart reviewed and discussed with SW. Pt seen by PT, discharge plan is set for TCU and pt is agreeable, may discharge tomorrow. Will defer OT eval to next level of care.

## 2022-01-05 NOTE — PLAN OF CARE
Pt denies chest pain or SOB, up with SBA and a walker, voiding, good PO intake, Tele SR, looking for TCU placement.

## 2022-01-05 NOTE — PROGRESS NOTES
Pt complained of pain to left shoulder, Xray negative for break, up with 1 GB&W, voiding well, good PO intake, IV SL'd, Heparin gtt off and started on a DOAC, Possible discharge tomorrow.

## 2022-01-05 NOTE — PROGRESS NOTES
[facility]  Cardiology Progress Note    Date of Service (when I saw the patient): 01/05/2022  Summary: Connie Longoria is a 61 year old female with history of insulin dependent DM type II, coronary artery disease s/p CABG x 4 in 2011 with a LIMA to the LAD, sequential SVG to ramus, the OM and then to the distal circumflex, hypertension, obesity, prior CVA in 2007, carotid artery disease s/p L carotid endarterectomy in 2015, CKD, chronic hepatitis C, rheumatoid arthritis, cirrhosis, significant tobacco use, dyslipidemia, and blindness in her left eye who was admitted on 1/3/2022 with weakness and a UTI. She was found to have new AF with RVR on admission thus cardiology was consulted.  Interval History   Feeling much better this morning. Remains in NSR this morning. No chest pain or shortness of breath.  Assessment & Plan   Summary:   1.  Paroxsymal atrial fibrillation. New diagnosis. Asymptomatic on presentation. Converted to NSR after admission with Metoprolol. Suspect this was likely precipitated by her acute illness. On Metoprolol XL 25 mg daily prior to admission.   -  Her [id number] is quite high at 7 (female, CVA, DM, MI, CHF, hypertension). Started on anticoagulation with Eliquis 5 mg twice daily.  -  Echo shows EF of 50 - 55% with inferior wall motion abnormalities. LA was borderline dilated.  2.  Hx CAD. S/p CABG in x 4 in 2011 with a LIMA to the LAD, sequential SVG to ramus, the OM and then to the distal circumflex. Nuclear stress test in 2015 without evidence of ischemia.  -  On aspirin, zocor, and Metoprolol XL prior to admission.   -  HS troponin elevated at 280 on admission, trended up to 493 consistent with demand ischemia from rapid atrial fibrillation. She denies anginal symptoms and EF is stable/improved thus would defer further ischemia work up at this point unless she develops any new symptoms.  3.  Hx mild ischemic cardiomyopathy. EF 45% by last echo at [facility] in  2015. NTproBNP 2400 on admission. She appears euvolemic.  4.  UTI. On antibiotics.   5.  IDDM type II. Last hgba1c in October was 6.4.  6.  CVA. In 2007.  7.  Carotid artery disease s/p  L carotid endarterectomy in 2015.  8.  LAURI on CKD stage III. Baseline creatinine 1.1 - 1.3. Up to 1.9 on admission likely secondary to dehydration.   -  Improving with IVF.    9.  Hx RA.  10.  Hx chronic hepatitis C infection.  11.  Hyperlipidemia.LDL in March of 2021 was 109. On zocor 10 mg daily. Appears she has not tolerated atorvastatin or rosuvastatin.   -  Consider increasing statin as an outpatient. I am not sure if she has been on higher doses.  12.  Hx positive FOBT in 2020. Per notes, she cannot complete a colonoscopy as she reports she cannot do the prep. Hemoglobin is normal.    Plan:  1.  Continue Metoprolol XL and Eliquis for management of her paroxsymal atrial fibrillation.  2.  She would like to follow up with us in clinic. Will arrange for LUC follow up in a few weeks.   3.  Okay to discharge fom a cardiology perspective. Please call with questions.     Lin Cancino PA-C    Physical Exam   Temp: 98.6  F (37  C) Temp src: Oral BP: 118/77 Pulse: 66   Resp: 18 SpO2: 97 % O2 Device: None (Room air)    Vitals:    01/03/22 2200 01/05/22 0008   Weight: 80.2 kg (176 lb 12.9 oz) 82.5 kg (181 lb 14.4 oz)     Vital Signs with Ranges  Temp:  [98.5  F (36.9  C)-98.6  F (37  C)] 98.6  F (37  C)  Pulse:  [66-74] 66  Resp:  [18] 18  BP: (118-136)/(75-77) 118/77  SpO2:  [97 %-98 %] 97 %  No intake/output data recorded.  Constitutional: NAD.  Cardiovascular: RRR, s1s2  GI: soft, BS+  Skin: warm, no rashes  Musculoskeletal: Moving all extremities. No edema.  Neurologic: Alert, oriented x 3  Neuropsychiatric: Normal affect   Data   Results for orders placed or performed during the hospital encounter of 01/03/22 (from the past 24 hour(s))   Pharmacy Liaison for Medication Coverage    Narrative    Sherly Lowery     1/4/2022  1:07  PM  Anticoagulation coverage check.  Patient has Medicare D through   [employer] with [employer].    Xarelto/Eliquis:  $0/mo.     Jantoven (warfarin): $0/mo      -LELAND Lowery, Pharmacy Technician/Liaison, Discharge Pharmacy,   [phone]     Heparin Unfractionated Anti Xa Level   Result Value Ref Range    Anti Xa Unfractionated Heparin 0.30 For Reference Range, See Comment IU/mL    Narrative    Therapeutic Range: UFH: 0.25-0.50 IU/mL for low intensity dosing,  0.30-0.70 IU/mL for high intensity dosing DVT and PE.  This test is not validated for other direct factor X inhibitors (e.g. rivaroxaban, apixaban, edoxaban, betrixaban, fondaparinux) and should not be used for monitoring of other medications.   Troponin I   Result Value Ref Range    Troponin I High Sensitivity 376 (HH) <54 ng/L   TSH with free T4 reflex   Result Value Ref Range    TSH 0.48 0.40 - 4.00 mU/L   Glucose by meter   Result Value Ref Range    GLUCOSE BY METER POCT 204 (H) 70 - 99 mg/dL   Echocardiogram Complete   Result Value Ref Range    LVEF  50-55%     Narrative    [id number]  [id number]  [id number]  [id number]^YADY^SHANT     [facility]  Echocardiography Laboratory  [address] [place] [address]     Name: CARIN DREW  MRN: [id number]  : 1960  Study Date: 2022 02:13 PM  Age: 61 yrs  Gender: Female  Patient Location: [facility]  Reason For Study: Atrial Fibrillation  Ordering Physician: SHANT CONTEH  Referring Physician: Jessica Brand  Performed By: Fabiano Maher     BSA: 1.9 m2  Height: 64 in  Weight: 176 lb  HR: 57  BP: 138/89 mmHg  ______________________________________________________________________________  Procedure  Complete Portable Echo Adult. Optison (NDC #7020-1308) given intravenously.  Technically difficult study.  ______________________________________________________________________________  Interpretation Summary     The visual ejection fraction is 50-55%.  There is severe inferolateral  and mild inferior/ inferoseptal hypokinesis.  Mild-moderate concentric LVH is present.  There is severe trileaflet aortic sclerosis with very mild AS.  Normal RV size with borderline/ low normal RV function.     Technically difficult study. Contrast used with improvement.  ______________________________________________________________________________  Left Ventricle  The left ventricle is normal in size. There is mild to moderate concentric  left ventricular hypertrophy. The visual ejection fraction is 50-55%. Left  ventricular diastolic function is not assessable. There is severe  inferolateral and mild inferior/ inferoseptal hypokinesis.     Right Ventricle  The right ventricle is normal size. The right ventricular systolic function is  borderline reduced.     Atria  The left atrium is borderline dilated. Right atrial size is normal.     Mitral Valve  The mitral valve is normal in structure and function. There is mild mitral  annular calcification. There is trace to mild mitral regurgitation. There is  no mitral valve stenosis.     Tricuspid Valve  The tricuspid valve is not well visualized, but is grossly normal. The right  ventricular systolic pressure is approximated at 13.4 mmHg plus the right  atrial pressure. There is trace tricuspid regurgitation.     Aortic Valve  There is severe trileaflet aortic sclerosis. There is trace aortic  regurgitation. The calculated aortic valve are is 1.7 cm^2. The mean AoV  pressure gradient is 6.3 mmHg. The peak AoV pressure gradient is 12.0 mmHg.     Pulmonic Valve  The pulmonic valve is not well visualized.     Vessels  The aortic root is normal size. The inferior vena cava was normal in size with  preserved respiratory variability.     Pericardium  There is no pericardial effusion.     Rhythm  Sinus rhythm was noted.  ______________________________________________________________________________  MMode/2D Measurements & Calculations  IVSd: 1.6 cm     LVIDd: 3.9 cm  LVIDs:  3.6 cm  LVPWd: 1.5 cm  FS: 8.2 %  LV mass(C)d: 242.6 grams  LV mass(C)dI: 131.0 grams/m2  Ao root diam: 2.7 cm  LA dimension: 3.6 cm  asc Aorta Diam: 3.0 cm  LA/Ao: 1.3  LVOT diam: 2.0 cm  LVOT area: 3.1 cm2  LA Volume (BP): 56.0 ml  LA Volume Index (BP): 30.3 ml/m2  RWT: 0.77     Doppler Measurements & Calculations  MV E max jose: 69.4 cm/sec  MV A max jose: 72.3 cm/sec  MV E/A: 0.96  MV dec slope: 225.9 cm/sec2  Ao V2 max: 171.6 cm/sec  Ao max P.0 mmHg  Ao V2 mean: 116.1 cm/sec  Ao mean P.3 mmHg  Ao V2 VTI: 31.9 cm  LARS(I,D): 1.7 cm2  LARS(V,D): 1.6 cm2  LV V1 max PG: 3.3 mmHg  LV V1 max: 90.2 cm/sec  LV V1 VTI: 17.3 cm  SV(LVOT): 52.7 ml  SI(LVOT): 28.5 ml/m2  PA acc time: 0.11 sec  TR max jose: 183.2 cm/sec  TR max P.4 mmHg  AV Jose Ratio (DI): 0.53  LARS Index (cm2/m2): 0.89     ______________________________________________________________________________  Report approved by: Don Rees 2022 04:40 PM         Glucose by meter   Result Value Ref Range    GLUCOSE BY METER POCT 281 (H) 70 - 99 mg/dL   XR Shoulder Left G/E 3 Views    Narrative    XR SHOULDER LEFT G/E 3 VIEWS  2022 4:55 PM     HISTORY: fall, pain with lifting arm    COMPARISON: None      Impression    IMPRESSION: No acute fracture or malalignment. There is normal  glenohumeral joint spacing. Mild acromioclavicular joint degenerative  changes. Osteopenia. Aortic atherosclerosis. Median sternotomy wires.    LISA AGUIRRE MD         SYSTEM ID:  [id number]   Glucose by meter   Result Value Ref Range    GLUCOSE BY METER POCT 260 (H) 70 - 99 mg/dL   Glucose by meter   Result Value Ref Range    GLUCOSE BY METER POCT 156 (H) 70 - 99 mg/dL   Basic metabolic panel   Result Value Ref Range    Sodium 135 133 - 144 mmol/L    Potassium 3.2 (L) 3.4 - 5.3 mmol/L    Chloride 103 94 - 109 mmol/L    Carbon Dioxide (CO2) 27 20 - 32 mmol/L    Anion Gap 5 3 - 14 mmol/L    Urea Nitrogen 42 (H) 7 - 30 mg/dL    Creatinine 1.26 (H) 0.52 - 1.04 mg/dL     Calcium 9.0 8.5 - 10.1 mg/dL    Glucose 137 (H) 70 - 99 mg/dL    GFR Estimate 48 (L) >60 mL/min/1.73m2   Glucose by meter   Result Value Ref Range    GLUCOSE BY METER POCT 130 (H) 70 - 99 mg/dL     Tele SR, PCVs    Medications       apixaban ANTICOAGULANT  5 mg Oral BID     aspirin  81 mg Oral Daily     bimatoprost  1 drop Right Eye At Bedtime     brimonidine-timolol  1 drop Right Eye BID     cefTRIAXone  1 g Intravenous Q24H     dorzolamide  1 drop Right Eye BID     gabapentin  600 mg Oral TID     insulin aspart  1-10 Units Subcutaneous TID AC     insulin aspart  1-7 Units Subcutaneous At Bedtime     insulin glargine  35 Units Subcutaneous At Bedtime     metoprolol succinate ER  25 mg Oral Daily     simvastatin  10 mg Oral At Bedtime     sodium chloride (PF)  3 mL Intracatheter Q8H

## 2022-01-05 NOTE — PROGRESS NOTES
Care Management Follow Up    Length of Stay (days): 2    Expected Discharge Date: 01/05/2022     Concerns to be Addressed: discharge planning     Patient plan of care discussed at interdisciplinary rounds: Yes    Anticipated Discharge Disposition: Transitional Care     Anticipated Discharge Services:  therapy  Anticipated Discharge DME: None    Patient/family educated on Medicare website which has current facility and service quality ratings: yes  Education Provided on the Discharge Plan:  yes  Patient/Family in Agreement with the Plan: yes    Referrals Placed by CM/SW:  TCU referrals  Private pay costs discussed: Not applicable    Additional Information:  Spoke with Sherly, the care coordinator, regarding discharge plans.  Patient is now agreeable to go to TCU on discharge.  Patient states she would like to be close to home.  Referrals sent, via discharge on the double, to check bed availability at [facility], [facility], and [facility].  Received a call back from [facility].  They have a bed available for tomorrow.  Wheelchair ride set to go through the skyway at 11:30 tomorrow.  They will have a lunch tray available for patient once she gets to [facility].    Will continue to follow.        VINAY Barrera, [facility]    [phone]  [facility]

## 2022-01-05 NOTE — DISCHARGE SUMMARY
"Discharge Summary    Connie Longoria MRN# [id number]   YOB: 1960 Age: 61 year old     Date of Admission:  1/3/2022  Date of Discharge:  1/5/2022  Admitting Physician:  No admitting provider for patient encounter.  Discharge Physician:  Stewart Galvan MD  Discharging Service:  Hospitalist       Primary Provider: Jessica Brand          Admission / Discharge Diagnoses:   1. Atrial fibrillation with RVR  2. Generalized weakness  3. Suspected UTI, probably has asymptomatic mild bacteriuria  4. Mild troponin leak secondary to #1  5. Acute renal failure secondary to #1  6. Diabetes mellitus 2  7. Coronary artery disease  8. Rheumatoid arthritis  9. GERD  10. Chronic hep C, cirrhosis  11. Depression  12. 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 Diabetic neuropathy  14. Obesity  15. Mild hypokalemia               Discharge Disposition:     Discharged to home           Condition on Discharge:     Discharge condition: Stable   Discharge vitals: Blood pressure 129/78, pulse 64, temperature 98.6  F (37  C), temperature source Oral, resp. rate 20, height 1.626 m (5' 4\"), weight 83.6 kg (184 lb 4.8 oz), SpO2 98 %.   Code status on discharge: Full Code     Discharge Exam  GA: Pt is alert and oriented x3 NAD, pleasant and conversive, speaking in full sentances  HEENT: AT/NC, EOMI, PRRLA R , Sclera non-icteric, dry mm.   NECK: supple, No LAD, no thyromegaly  RS: CTAB, good air movement, decreased BS at bases.   CVS: RRR, Nml S1/S2, no g/r, pulses 2+ B symmetrical, no edema, JVP: flat, systolic murmur.   GI: ND/NT, soft, BS + throughout, no masses or HSM  MSL: without deformity, normal range of motion  SKIN: Warm, dry, no rashes  CNS: AO X 3, CNII-XII intact, motor normal strength 5/5 and tone, without any focal deficits,   EXTR: Moves all extremities, no clubbing, cyanosis, or edema         Procedures / Labs / Imaging:   PROCEDURES:     IMAGING:  CXR  IMPRESSION: Sternotomy. The 2 most superior sternal " wires appear fractured. The lungs are clear. No pneumothorax. Pulmonary vascularity is within normal limits.     Echo  The visual ejection fraction is 50-55%.  There is severe inferolateral and mild inferior/ inferoseptal hypokinesis.  Mild-moderate concentric LVH is present.  There is severe trileaflet aortic sclerosis with very mild AS.  Normal RV size with borderline/ low normal RV function.     Technically difficult study. Contrast used with improvement.       EKG:  Afib with RVR            Medications Prior to Admission:     Medications Prior to Admission   Medication Sig Dispense Refill Last Dose     aspirin 81 MG tablet Take 81 mg by mouth daily   Past Week at Unknown time     bimatoprost (LUMIGAN) 0.01 % SOLN Place 1 drop into the right eye At Bedtime   Past Week at Unknown time     brimonidine-timolol (COMBIGAN) 0.2-0.5 % ophthalmic solution Place 1 drop into the right eye 2 times daily   Past Week at Unknown time     dorzolamide (TRUSOPT) 2 % ophthalmic solution Place 1 drop into the right eye 2 times daily   Past Week at Unknown time     gabapentin (NEURONTIN) 600 MG tablet Take 600 mg by mouth 3 times daily   Past Week at Unknown time     LISINOPRIL PO Take 10 mg by mouth daily   Past Week at Unknown time     metoclopramide (REGLAN) 5 MG tablet Take 5 mg by mouth 4 times daily as needed   prn med     metoprolol succinate ER (TOPROL-XL) 25 MG 24 hr tablet Take 25 mg by mouth daily   Past Week at Unknown time     simvastatin (ZOCOR) 20 MG tablet Take 20 mg by mouth At Bedtime   Past Week at Unknown time     VITAMIN D PO Take 1 tablet by mouth daily OTC: Patient unsure of strength   Past Week at Unknown time     [DISCONTINUED] insulin glargine (LANTUS PEN) 100 UNIT/ML pen Inject 35 Units Subcutaneous At Bedtime   Past Week at Unknown time             Discharge Medications:     Current Discharge Medication List      START taking these medications    Details   apixaban ANTICOAGULANT (ELIQUIS) 5 MG tablet Take  1 tablet (5 mg) by mouth 2 times daily  Qty: 60 tablet, Refills: 0    Associated Diagnoses: Paroxysmal atrial fibrillation (H)      citalopram (CELEXA) 10 MG tablet Take 1 tablet (10 mg) by mouth daily  Qty: 30 tablet, Refills: 0    Associated Diagnoses: Current moderate episode of major depressive disorder, unspecified whether recurrent (H)      !! insulin glargine (LANTUS PEN) 100 UNIT/ML pen Inject 25 Units Subcutaneous At Bedtime  Qty: 15 mL    Comments: If Lantus is not covered by insurance, may substitute Basaglar at same dose and frequency.    Associated Diagnoses: Type 2 diabetes mellitus with stage 3a chronic kidney disease, with long-term current use of insulin (H)       !! - Potential duplicate medications found. Please discuss with provider.      CONTINUE these medications which have NOT CHANGED    Details   aspirin 81 MG tablet Take 81 mg by mouth daily      bimatoprost (LUMIGAN) 0.01 % SOLN Place 1 drop into the right eye At Bedtime      brimonidine-timolol (COMBIGAN) 0.2-0.5 % ophthalmic solution Place 1 drop into the right eye 2 times daily      dorzolamide (TRUSOPT) 2 % ophthalmic solution Place 1 drop into the right eye 2 times daily      gabapentin (NEURONTIN) 600 MG tablet Take 600 mg by mouth 3 times daily             LISINOPRIL PO Take 10 mg by mouth daily      metoclopramide (REGLAN) 5 MG tablet Take 5 mg by mouth 4 times daily as needed      metoprolol succinate ER (TOPROL-XL) 25 MG 24 hr tablet Take 25 mg by mouth daily      simvastatin (ZOCOR) 20 MG tablet Take 20 mg by mouth At Bedtime      VITAMIN D PO Take 1 tablet by mouth daily OTC: Patient unsure of strength       !! - Potential duplicate medications found. Please discuss with provider.      STOP taking these medications       gabapentin (NEURONTIN) 400 MG capsule Comments:   Reason for Stopping:         insulin detemir (LEVEMIR) 100 UNIT/ML injection Comments:   Reason for Stopping:         METOPROLOL TARTRATE PO Comments:   Reason  for Stopping:                     Consultations:     Cardiology             Brief History of Illness:     Connie Longoria is a 61 year old female with h/o RA, GERD, CAD, s/p CABG, Chronic HFrEF, presented with generalized weakness, no taste, severe nausea, presyncopal. She was found to be in Afib with RVR, new ARF with BUN/Cr of 77/1.95 (c/w poor renal perfusion). Significant recovery overnight with starting metoprolol and her flipping into NSR. Dirty UA found, but no true symptoms suggestive of UTI.           Hospital Course:     1. Afib with RVR: New onset Afib, pt could not feel any palpitations / asymptomatic. Only constitutional symptoms of hypoperfusion / low BP. Checked echo / TSH. Continue metoprolol. Stroke ppx with heparin gtt, converted to eliquis at dc. CHADsVasc2 score is 7. Cards consulted. Defer to them about outpatient f/u, need for EPS / RFA, needs ECV if pt flips back in NSR. At this point, would not be safe for ECV due to prolonged period of Afib.   2. Generalized weakness:  Probably due to Afib with RVR (has signs of end organ damage with ATN, presycope / falls, sleepiness, etc). PT to eval and did well with PT.   3. Suspected UTI: When I interviewed pt on 1/4, denied any dysuria for urinary symptoms for the last few weeks. Was surprised that we were suspecting UTI. UA is abnormal for LE (WBC present in UA from ATN), but nitr Neg. Clinically doubt UTI, Urine Cx grew only 10-50,000 Enterococcus. Stop abx at dc.   4. Mild troponin leak: Due to Afib with RVR. Defer to cards about ischemia evaluation. Can be done as outpatient.   5. ARF: Baseline Cr 0.9, Presented with BUN/Cr of 77/1.95 suggestive of pre-renal / ATN caused by Afib with RVR. Treat with IVF / treating Afib with RVR. BUN/Cr already improved to 61/1.56. Avoid any new nephrotoxins (contrast, NSAIDs, ACEi/ARB, etc). Restarted low dose lisinopril at CT with her h/o DM/HTN/CAD/ HFrEF.   6. Hyponatremia: Due to dehydration, resolved with IVF.    7. DM2: HgbA1C 6.4, Cont PTA Lantus. + sliding scale insulin. No sliding scale insulin at dc. Low glucose morning of disccharge, so lantus dose was decreased.   8. CAD: s/p CABG X 4. Cont ASA, BB.   9. Chronic conditions:   a. RA: No DMARDs, no acute flare.   b. GERD:  Cont PTA Prilosec   c. H/o stroke: Per chart. Pt will be on NOAC.   d. Chronic Hep C / Cirrhosis: No interventions.   e. Depression: Chronic depression with acute exacerbation due to 3 deaths in the family this year. D/w pt and will start pt on low dose Celexa. Not suicidal, no inpt psych consult done.    f. Diabetic retinopathy / Blind L eye: cont eye drops.   g. Diabetic neuropathy: Cont PTA neurontin.   h. Obesity: No interventions.   i. Deconditioning: PT recommended dc to TCU.   j. Hypokalemia: mild probably due to metabolic shifts.   10. Code status: Full Code  11. Dispo: dc TCU                  Pending Results:                Discharge Instructions and Follow-Up:     Discharge diet: Diabetic (1800 ADA)   Discharge activity: Activity as tolerated   Discharge follow-up: Follow up with primary care provider in 1 week and with cardiology.     Outpatient therapy: none    Home Care agency: None    Supplies and equipment: None   Lines and drains: None    Wound care: None   Other instructions: None      Total Time: 35 min spent coordinating discharge.   Stewart Galvan MD

## 2022-01-06 ENCOUNTER — LAB REQUISITION (OUTPATIENT)
Dept: LAB | Facility: CLINIC | Age: 62
End: 2022-01-06

## 2022-01-06 VITALS
RESPIRATION RATE: 20 BRPM | HEART RATE: 64 BPM | TEMPERATURE: 98.6 F | WEIGHT: 184.3 LBS | SYSTOLIC BLOOD PRESSURE: 129 MMHG | HEIGHT: 64 IN | DIASTOLIC BLOOD PRESSURE: 78 MMHG | OXYGEN SATURATION: 98 % | BODY MASS INDEX: 31.47 KG/M2

## 2022-01-06 VITALS
RESPIRATION RATE: 16 BRPM | SYSTOLIC BLOOD PRESSURE: 118 MMHG | TEMPERATURE: 98.1 F | DIASTOLIC BLOOD PRESSURE: 71 MMHG | HEART RATE: 68 BPM | OXYGEN SATURATION: 97 %

## 2022-01-06 DIAGNOSIS — Z00.01 ENCOUNTER FOR GENERAL ADULT MEDICAL EXAMINATION WITH ABNORMAL FINDINGS: ICD-10-CM

## 2022-01-06 LAB
ERYTHROCYTE [DISTWIDTH] IN BLOOD BY AUTOMATED COUNT: 12.6 % (ref 10–15)
GLUCOSE BLDC GLUCOMTR-MCNC: 64 MG/DL (ref 70–99)
HCT VFR BLD AUTO: 35.5 % (ref 35–47)
HGB BLD-MCNC: 12.2 G/DL (ref 11.7–15.7)
MCH RBC QN AUTO: 32 PG (ref 26.5–33)
MCHC RBC AUTO-ENTMCNC: 34.4 G/DL (ref 31.5–36.5)
MCV RBC AUTO: 93 FL (ref 78–100)
PLATELET # BLD AUTO: 130 10E3/UL (ref 150–450)
RBC # BLD AUTO: 3.81 10E6/UL (ref 3.8–5.2)
WBC # BLD AUTO: 8.1 10E3/UL (ref 4–11)

## 2022-01-06 PROCEDURE — 36415 COLL VENOUS BLD VENIPUNCTURE: CPT | Performed by: EMERGENCY MEDICINE

## 2022-01-06 PROCEDURE — 99232 SBSQ HOSP IP/OBS MODERATE 35: CPT | Performed by: INTERNAL MEDICINE

## 2022-01-06 PROCEDURE — 85027 COMPLETE CBC AUTOMATED: CPT | Performed by: EMERGENCY MEDICINE

## 2022-01-06 PROCEDURE — 250N000013 HC RX MED GY IP 250 OP 250 PS 637: Performed by: STUDENT IN AN ORGANIZED HEALTH CARE EDUCATION/TRAINING PROGRAM

## 2022-01-06 PROCEDURE — 250N000013 HC RX MED GY IP 250 OP 250 PS 637: Performed by: INTERNAL MEDICINE

## 2022-01-06 RX ADMIN — GABAPENTIN 600 MG: 300 CAPSULE ORAL at 09:00

## 2022-01-06 RX ADMIN — BRIMONIDINE TARTRATE, TIMOLOL MALEATE 1 DROP: 2; 5 SOLUTION/ DROPS OPHTHALMIC at 09:02

## 2022-01-06 RX ADMIN — ASPIRIN 81 MG: 81 TABLET, COATED ORAL at 09:00

## 2022-01-06 RX ADMIN — DORZOLAMIDE HYDROCHLORIDE 1 DROP: 20 SOLUTION/ DROPS OPHTHALMIC at 09:01

## 2022-01-06 RX ADMIN — APIXABAN 5 MG: 5 TABLET, FILM COATED ORAL at 09:01

## 2022-01-06 RX ADMIN — METOPROLOL SUCCINATE 25 MG: 25 TABLET, EXTENDED RELEASE ORAL at 09:01

## 2022-01-06 ASSESSMENT — ACTIVITIES OF DAILY LIVING (ADL)
ADLS_ACUITY_SCORE: 13

## 2022-01-06 ASSESSMENT — MIFFLIN-ST. JEOR: SCORE: 1380.98

## 2022-01-06 NOTE — PROGRESS NOTES
Care Management Discharge Note    Discharge Date: 01/06/2022       Discharge Disposition: Transitional Care    Discharge Services:  Therapy    Discharge DME: None    Discharge Transportation: Transport via the [facility]    Private pay costs discussed: Not applicable    PAS Confirmation Code:  [id number]  Patient/family educated on Medicare website which has current facility and service quality ratings: yes    Education Provided on the Discharge Plan:  yes  Persons Notified of Discharge Plans: yes  Patient/Family in Agreement with the Plan: yes    Handoff Referral Completed: No    Additional Information:  Received discharge orders for patient.  Bed available at [facility] for today.  Patient will transport, via the [facility], at 11:30 today.  Patient informed of the plan and in agreement to the plan.  Call placed to update [facility] and faxed the orders and the PAS.      PAS-RR    D: Per DHS regulation, SW completed and submitted PAS-RR to [place] [employer] Direct Connect via the [employer] Line.  PAS-RR confirmation # is : [id number].    I: SW spoke with patient and they are aware a PAS-RR has been submitted.  SW reviewed with patient that they may be contacted for a follow up appointment within 10 days of hospital discharge if their SNF stay is < 30 days.  Contact information for [facility] was also provided.    A: Patient verbalized understanding.    P: Further questions may be directed to [facility] at #[phone], option #4 for PAS-RR staff.        VINAY Barrera, [facility]    [phone]  [facility]

## 2022-01-06 NOTE — PROGRESS NOTES
"[facility]    Medicine Progress Note - Hospitalist Service       Date of Admission: 1/3/2022      PROGRESS NOTE    SUBJECTIVE  Pt doing well. Did not go to TCU yesterday due to ride issues.     Remaining ROS: Neg    OBJECTIVE  Vital Signs with Ranges  Temp:  [98.2  F (36.8  C)-98.6  F (37  C)] 98.6  F (37  C)  Pulse:  [61-67] 64  Resp:  [18-20] 20  BP: (105-147)/(67-83) 129/78  SpO2:  [94 %-99 %] 98 %  I/O last 3 completed shifts:  In: 720 [P.O.:720]  Out: -   /78   Pulse 64   Temp 98.6  F (37  C) (Oral)   Resp 20   Ht 1.626 m (5' 4\")   Wt 83.6 kg (184 lb 4.8 oz)   SpO2 98%   BMI 31.64 kg/m    Wt Readings from Last 2 Encounters:   01/06/22 83.6 kg (184 lb 4.8 oz)   10/24/19 80.7 kg (178 lb)   No data recorded.  Body mass index is 31.64 kg/m .    PHYSICAL EXAM:  GA: Pt is alert and oriented x3 NAD, pleasant and conversive, speaking in full sentances  HEENT: AT/NC, EOMI, PRRLA R , Sclera non-icteric, dry mm.   NECK: supple, No LAD, no thyromegaly  RS: CTAB, good air movement, decreased BS at bases.   CVS: RRR, Nml S1/S2, no g/r, pulses 2+ B symmetrical, no edema, JVP: flat, systolic murmur.   GI: ND/NT, soft, BS + throughout, no masses or HSM  MSL: without deformity, normal range of motion  SKIN: Warm, dry, no rashes  CNS: AO X 3, CNII-XII intact, motor normal strength 5/5 and tone, without any focal deficits,   EXTR: Moves all extremities, no clubbing, cyanosis, or edema    PROCEDURES:    IMAGING:  CXR  IMPRESSION: Sternotomy. The 2 most superior sternal wires appear fractured. The lungs are clear. No pneumothorax. Pulmonary vascularity is within normal limits.    Echo  Pending.     EKG:  Afib with RVR    ASSESSMENT: Connie Longoria is a 61 year old female with h/o RA, GERD, CAD, s/p CABG, Chronic HFrEF, presented with generalized weakness, no taste, severe nausea, presyncopal. She was found to be in Afib with RVR, new ARF with BUN/Cr of 77/1.95 (c/w poor renal perfusion). " Significant recovery overnight with starting metoprolol and her flipping into NSR. Dirty UA found, but no true symptoms suggestive of UTI.     PLAN:    1. Afib with RVR: New onset Afib, pt could not feel any palpitations / asymptomatic. Only constitutional symptoms of hypoperfusion / low BP. Checked echo / TSH. Continue metoprolol. Stroke ppx with heparin gtt started, CHADsVasc2 score is 7, switched to eliquis at dc. . Cards consulted. Defer to them about outpatient f/u, need for EPS / RFA, needs ECV if pt flips back in NSR. At this point, would not be safe for ECV due to prolonged period of Afib.   2. Generalized weakness:  Probably due to Afib with RVR (has signs of end organ damage with ATN, presycope / falls, sleepiness, etc). PT to eval.   3. Suspected UTI: When I interviewed pt on 1/4, denied any dysuria for urinary symptoms for the last few weeks. Was surprised that we were suspecting UTI. UA is abnormal for LE (WBC present in UA from ATN), but nitr Neg. Clinically doubt UTI, await urine cultures and stop ABX. Pt ok with that plan.   4. Mild troponin leak: Due to Afib with RVR. Defer to cards about ischemia evaluation. Can be done as outpatient.   5. ARF: Baseline Cr 0.9, Presented with BUN/Cr of 77/1.95 suggestive of pre-renal / ATN caused by Afib with RVR. Treat with IVF / treating Afib with RVR. BUN/Cr already improved to 61/1.56. Avoid any new nephrotoxins (contrast, NSAIDs, ACEi/ARB, etc). Restart after dc.    6. Hyponatremia: Due to dehydration, resolved with IVF.   7. DM2: HgbA1C 6.4, Cont PTA Lantus. + sliding scale insulin. No sliding scale insulin at dc. Low glucose in am, will decrease lantus to 25 units.   8. CAD: s/p CABG X 4. Cont ASA, BB.   9. Chronic conditions:   a. RA: No DMARDs, no acute flare.   b. GERD:  Cont PTA Prilosec   c. H/o stroke: Per chart. Pt will be on NOAC.   d. Chronic Hep C / Cirrhosis: No interventions.   e. Depression: No interventions.   f. Diabetic retinopathy / Blind L  eye: cont eye drops.   g. Diabetic neuropathy: Cont PTA neurontin.   h. Obesity: No interventions.   10. Code status: Full Code  11. FEN:  a. Diabetic / Coronary Diet.   b. Lytes stable.   12. Access:PIV  13. Routine cares: GI: Prilosec, DVT: Full AC for Afib.   14. PTA meds: reconciled.   15. Family communication: none.   Dispo: dc to TCU due to deconditioning.     The patient's care was discussed with the charge nurse .      Stewart Galvan MD  Hospitalist Service  [facility]  Securely message with the [employer] Web Console (learn more here)  Text page via [employer] Paging/Directory

## 2022-01-06 NOTE — PROGRESS NOTES
Neuro- AxO 4  Tele/Cardiac- SR PVC's  Resp- RA  Activity- SBA w/W  Pain- Denies  Drips- Intermittent Antibiotics  Drains/Tubes- PIV x 2  Skin- WDL   GI/- WDL  Aggression Color- green  COVID status- neg  Plan- Discharge to [facility] in AM 1/6

## 2022-01-06 NOTE — PLAN OF CARE
Physical Therapy Discharge Summary    Reason for therapy discharge:    Discharged to transitional care facility.    Progress towards therapy goal(s). See goals on Care Plan in [facility] electronic health record for goal details.  Goals partially met.  Barriers to achieving goals:   discharge from facility.    Therapy recommendation(s):    Continued therapy is recommended.  Rationale/Recommendations:  cont PT at TCU to further address deficits and optimize functional independence and safety.

## 2022-01-07 ENCOUNTER — LAB REQUISITION (OUTPATIENT)
Dept: LAB | Facility: CLINIC | Age: 62
End: 2022-01-07

## 2022-01-07 ENCOUNTER — TELEPHONE (OUTPATIENT)
Dept: CARDIOLOGY | Facility: CLINIC | Age: 62
End: 2022-01-07
Payer: MEDICARE

## 2022-01-07 ENCOUNTER — TRANSITIONAL CARE UNIT VISIT (OUTPATIENT)
Dept: GERIATRICS | Facility: CLINIC | Age: 62
End: 2022-01-07
Payer: MEDICARE

## 2022-01-07 DIAGNOSIS — I15.2 HYPERTENSION DUE TO ENDOCRINE DISORDER: ICD-10-CM

## 2022-01-07 DIAGNOSIS — D64.9 NORMOCYTIC ANEMIA: ICD-10-CM

## 2022-01-07 DIAGNOSIS — B18.2 CHRONIC HEPATITIS C WITHOUT HEPATIC COMA (H): ICD-10-CM

## 2022-01-07 DIAGNOSIS — N18.31 CHRONIC KIDNEY DISEASE, STAGE 3A (H): ICD-10-CM

## 2022-01-07 DIAGNOSIS — Z00.01 ENCOUNTER FOR GENERAL ADULT MEDICAL EXAMINATION WITH ABNORMAL FINDINGS: ICD-10-CM

## 2022-01-07 DIAGNOSIS — N18.31 TYPE 2 DIABETES MELLITUS WITH STAGE 3A CHRONIC KIDNEY DISEASE, WITH LONG-TERM CURRENT USE OF INSULIN (H): ICD-10-CM

## 2022-01-07 DIAGNOSIS — I48.91 ATRIAL FIBRILLATION WITH RVR (H): ICD-10-CM

## 2022-01-07 DIAGNOSIS — Z79.4 TYPE 2 DIABETES MELLITUS WITH STAGE 3A CHRONIC KIDNEY DISEASE, WITH LONG-TERM CURRENT USE OF INSULIN (H): ICD-10-CM

## 2022-01-07 DIAGNOSIS — K21.9 CHRONIC GERD: ICD-10-CM

## 2022-01-07 DIAGNOSIS — E87.6 HYPOKALEMIA: ICD-10-CM

## 2022-01-07 DIAGNOSIS — E11.22 TYPE 2 DIABETES MELLITUS WITH STAGE 3A CHRONIC KIDNEY DISEASE, WITH LONG-TERM CURRENT USE OF INSULIN (H): ICD-10-CM

## 2022-01-07 DIAGNOSIS — I63.9 CEREBROVASCULAR ACCIDENT (CVA), UNSPECIFIED MECHANISM (H): Primary | ICD-10-CM

## 2022-01-07 DIAGNOSIS — N17.9 AKI (ACUTE KIDNEY INJURY) (H): ICD-10-CM

## 2022-01-07 PROCEDURE — 86481 TB AG RESPONSE T-CELL SUSP: CPT | Performed by: NURSE PRACTITIONER

## 2022-01-07 PROCEDURE — 36415 COLL VENOUS BLD VENIPUNCTURE: CPT | Performed by: NURSE PRACTITIONER

## 2022-01-07 PROCEDURE — U0005 INFEC AGEN DETEC AMPLI PROBE: HCPCS | Performed by: NURSE PRACTITIONER

## 2022-01-07 PROCEDURE — 99310 SBSQ NF CARE HIGH MDM 45: CPT | Performed by: NURSE PRACTITIONER

## 2022-01-07 RX ORDER — BRIMONIDINE TARTRATE AND TIMOLOL MALEATE 2; 5 MG/ML; MG/ML
1 SOLUTION OPHTHALMIC 2 TIMES DAILY
Status: CANCELLED | OUTPATIENT
Start: 2022-01-07

## 2022-01-07 NOTE — TELEPHONE ENCOUNTER
Patient was evaluated by cardiology while inpatient for weakness and UTI with new onset A. Fib with RVR-asymptomatic. PMH: history of insulin dependent DM type II, coronary artery disease s/p CABG x 4 in 2011 with a LIMA to the LAD, sequential SVG to ramus, the OM and then to the distal circumflex, hypertension, obesity, prior CVA in 2007, carotid artery disease s/p L carotid endarterectomy in 2015, CKD, chronic hepatitis C, rheumatoid arthritis, cirrhosis, significant tobacco use, dyslipidemia, and blindness in her left eye. Echo shows EF of 50 - 55% with inferior wall motion abnormalities. LA was borderline dilated. Converted to NSR after started on Metoprolol during admission. Started on Metoprolol and Eliquis at time of discharge. RN called [facility] to confirm the follow up plan for patient with Care Coordinator. RN confirmed with Care Coordinator that patient has a scheduled F/U OV on 1/21/21 at 1530 with LUC Piper Cancino at our [facility]. VIVIAN Sanchez RN.

## 2022-01-07 NOTE — PROGRESS NOTES
[facility] GERIATRIC SERVICES    PRIMARY CARE PROVIDER AND CLINIC:  [facility] [address] / [place]  Chief Complaint   Patient presents with     Hospital F/U      [facility] Medical Record Number:  [id number]  Place of Service where encounter took place:  [facility] (TCU) [[id number]]    Connie Longoria  is a 61 year old  (1960), admitted to the above facility from  [facility]. Hospital stay 1/3/22 through 1/6/22..   HPI:    This is a 61-year-old female who presented with generalized weakness, no TIAs, severe nausea and presyncope.  She was found to be in A. fib with RVR and dehydrated.  She was started on metoprolol and converted to normal sinus rhythm.  She was started on Eliquis at discharge.  Apparently not appropriate for ECV per prolonged time in A. Fib, will follow up with cardiology/EPS outpatient.  She has suspected UTI though minimal Enterococcus grew so antibiotics discontinued.  She developed ARF, improved with NSR, restarted on lisinopril due to h/o DM/HTN/CAD/ HFrEF.  Also had hyponatremia per dehydration, corrected with IVF.  Per diabetes her PTA Lantus was decreased per hypotension.  No other changes noticed was discharged to [facility] for rehab.    CODE STATUS/ADVANCE DIRECTIVES DISCUSSION:  Prior  CPR/Full code   ALLERGIES:   Allergies   Allergen Reactions     Ascorbate      Other reaction(s): Angioedema  Mouth swells     Atorvastatin      Denies allergy     Effexor [Venlafaxine] Nausea and Vomiting     PN: LW Reaction: gi upset     Other  [No Clinical Screening - See Comments]      Mouth swells  Other reaction(s): Angioedema  PN: LW Other1: -all berry     Rosuvastatin Other (See Comments)     ALT elevated  Alt elevated  Other reaction(s): Other - Describe In Comment Field  ALT elevated     Wellbutrin [Bupropion] Other (See Comments)     Severe HTN even on meds when on wellbutrin  Other reaction(s): Hypertension      PAST MEDICAL HISTORY:    Past Medical History:   Diagnosis Date     LAURI (acute kidney injury) (H) 12/16/2015     Atrial fibrillation with RVR (H) 1/3/2022     Congestive heart failure, unspecified      Coronary artery disease      CVA (cerebral vascular accident) (H)      Diabetes (H)      Elevated troponin 12/16/2015     Gastro-oesophageal reflux disease      Glaucoma      Heart attack (H)      Hyperlipidemia      Hypertension      Renal disease     from diabetes and steroid use     Rheumatoid arthritis (H)      Stented coronary artery      Steroid long-term use      Stroke (cerebrum) (H)      Type 2 diabetes mellitus (H) 12/21/2007    Overview:  Severe.  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  FAMILY HISTORY: family history includes Diabetes in her brother, mother, and sister; Glaucoma in her mother and sister.  SOCIAL HISTORY:   reports that she has been smoking cigarettes. She has been smoking about 0.20 packs per day. She has never used smokeless tobacco. She reports that she does not drink alcohol and does not use drugs.  Patient's living condition: lives alone    Post Discharge Medication Reconciliation Status: discharge medications reconciled and changed, per note/orders  Current Outpatient Medications   Medication Sig     acetaminophen (TYLENOL) 500 MG tablet Take 1,000 mg by mouth 3 times daily     calcium carbonate (TUMS) 500 MG chewable tablet Take 1 chew tab by mouth every 4 hours  as needed for heartburn     famotidine (PEPCID) 20 MG tablet Take 20 mg by mouth 2 times daily     apixaban ANTICOAGULANT (ELIQUIS) 5 MG tablet Take 1 tablet (5 mg) by mouth 2 times daily     aspirin 81 MG tablet Take 81 mg by mouth daily     bimatoprost (LUMIGAN) 0.01 % SOLN Place 1 drop into the right eye At Bedtime     brimonidine-timolol (COMBIGAN) 0.2-0.5 % ophthalmic solution Place 1 drop into the right eye 2 times daily     citalopram (CELEXA) 10 MG tablet Take 1 tablet (10 mg) by mouth daily     dorzolamide (TRUSOPT) 2 % ophthalmic solution Place 1 drop into the right eye 2 times daily     gabapentin (NEURONTIN) 600 MG tablet Take 600 mg by mouth 3 times daily     insulin glargine (LANTUS PEN) 100 UNIT/ML pen Inject 25 Units Subcutaneous At Bedtime     LISINOPRIL PO Take 10 mg by mouth daily     metoprolol succinate ER (TOPROL-XL) 25 MG 24 hr tablet Take 25 mg by mouth daily     simvastatin (ZOCOR) 20 MG tablet Take 20 mg by mouth At Bedtime     VITAMIN D PO Take 1 tablet by mouth daily OTC: Patient unsure of strength     No current facility-administered medications for this visit.       ROS:  10 point ROS of systems including Constitutional, Eyes, Respiratory, Cardiovascular, Gastroenterology, Genitourinary, Integumentary, Musculoskeletal, Psychiatric were all negative except for pertinent positives noted in my HPI.    Vitals:  /71   Pulse 68   Temp 98.1  F (36.7  C)   Resp 16   SpO2 97%   Exam:  GENERAL APPEARANCE:  Alert, oriented, morbidly obese, cooperative, L shoulder pain  ENT:  Mouth and posterior oropharynx normal, moist mucous membranes, normal hearing acuity  NECK:  No adenopathy,masses or thyromegaly  RESP:  respiratory effort and palpation of chest normal, lungs clear to auscultation , no respiratory distress  CV:  Palpation and auscultation of heart done , regular rate and rhythm, no murmur, rub, or gallop, no edema  ABDOMEN:  normal bowel sounds, soft, nontender, no  hepatosplenomegaly or other masses, GERD  M/S:   able to move all extremities  SKIN:  Inspection of skin and subcutaneous tissue baseline  NEURO:   No focal deficits  PSYCH:  oriented X 3, memory impaired     Lab/Diagnostic data:    Most Recent 3 CBC's:Recent Labs   Lab Test 01/06/22  0534 01/04/22  0700 01/03/22 2038   WBC 8.1 9.9 11.1*   HGB 12.2 14.1 17.2*   MCV 93 93 93   * 169 205     Most Recent 3 BMP's:Recent Labs   Lab Test 01/06/22  0730 01/05/22  2107 01/05/22  1655 01/05/22  0757 01/05/22  0540 01/04/22  0735 01/04/22  0700 01/03/22  2304 01/03/22 2038   NA  --   --   --   --  135  --  134  --  126*   POTASSIUM  --   --   --   --  3.2*  --  3.4  --  4.0   CHLORIDE  --   --   --   --  103  --  99  --  87*   CO2  --   --   --   --  27  --  27  --  25   BUN  --   --   --   --  42*  --  61*  --  77*   CR  --   --   --   --  1.26*  --  1.56*  --  1.95*   ANIONGAP  --   --   --   --  5  --  8  --  14   THOMAS  --   --   --   --  9.0  --  8.5  --  10.6*   GLC 64* 239* 243*   < > 137*   < > 228*   < > 384*    < > = values in this interval not displayed.     Most Recent Hemoglobin A1c:Recent Labs   Lab Test 01/03/22 2038   A1C 6.4*       ASSESSMENT/PLAN:    (I48.91) Atrial fibrillation with RVR (H)  (I15.2) Hypertension due to endocrine disorder  Converted to normal sinus rhythm with metoprolol succinate 25 mg daily and lisinopril 10 mg daily, follow-up with cardiology/EP per ECV.  Monitor blood pressure twice daily    (I63.9) Cerebrovascular accident (CVA), unspecified mechanism (H)  (primary encounter diagnosis)  History of A. fib  Continue statin and low-dose aspirin.  Recently started on apixaban 5 mg twice daily    (E11.22,  N18.31,  Z79.4) Type 2 diabetes mellitus with stage 3a chronic kidney disease, with long-term current use of insulin (H)  Last A1c 6.4 on 1/3/2022, continue Lantus 25 units at bedtime.  Start blood sugar checks twice daily    (K21.9) Chronic GERD  Discontinue Reglan.  Start  famotidine 20 mg twice daily and Tums 500 mg every 4 hours as needed    (B18.2) Chronic hepatitis C without hepatic coma (H)  Not on current treatment    (N18.31) Chronic kidney disease, stage 3a (H)  Last Cr 1.26 with GFR 48, recheck BMP on 1/10    (E87.6) Hypokalemia  Last K 3.2, recheck BMP on 1/10    (D64.9) Normocytic anemia  Last Hgb 12.2, recheck CBC on 1/10    Thrombocytopenia  Last platelet 130, recheck CBC on 1/10    Depression  Continue citalopram 10 mg daily    Glaucoma  Continue Lumigan and Combivent and Trusopt drops    Orders:  Discontinue Reglan, start famotidine and Tums, increase blood sugar monitoring, increased blood pressure monitoring, BMP/CBC recheck    Total time spent with patient visit at the skilled nursing facility was 36 including patient visit and review of past records. Greater than 50% of total time spent with counseling and coordinating care due to discontinue Reglan for excessive anticholinergic effect, start famotidine and Tums for GERD, increased blood sugar monitoring for diabetes, increase blood pressure monitoring per hypertension/A. fib, BMP/CBC recheck for hypokalemia and anemia and therapy, which lasted 20 minutes.     Electronically signed by:  Tavares Box NP

## 2022-01-07 NOTE — TELEPHONE ENCOUNTER
brimonidine-timolol (COMBIGAN) 0.2-0.5 % ophthalmic solution    Last Written Prescription Date:  ?  Last Fill Quantity: ?,   # refills: ?  Last Office Visit : 6/23/2020  Future Office visit:  None      Bethany William MD  Ophthalmology    Patient will discontinue and hold onto the Diamox 250mg pills.  DO NOT USE THESE PILLS.     Patient will also continue Genteal gel 4-6x/day in the left eye.         Monocular precautions discussion.  Patient will continue on Cosopt (Timolol/Dorzolamide) which is a blue top drop 2x/day (12 hours apart) in the RIGHT EYE ONLY and Alphagan (Brimonidine) which is a purple top drop 2x/day (12 hours apart) in the RIGHT EYE ONLY, and Travatan which is a teal top drop at bedtime in the RIGHT EYE ONLY.  Patient will return to clinic in 4-6 months with visual field test, dilated fundus exam, disc photos and IOP check.      Routing refill request to provider for review/approval because:  Several request         Dr. William gone  Pt needs to establish care with a New Provider  Please call Pt to schedule          Thank you       Jo Ann Barkley RN  Central Triage Red Flags/Med Refills

## 2022-01-07 NOTE — TELEPHONE ENCOUNTER
6-2021 telephone encounter notes pt seeing another eye provider    Another encounter stated [facility] was [facility] and updated not seeing eye provider with [facility]      Rx denied    Mason Williamson RN 1:06 PM 01/07/22

## 2022-01-08 ENCOUNTER — LAB REQUISITION (OUTPATIENT)
Dept: LAB | Facility: CLINIC | Age: 62
End: 2022-01-08

## 2022-01-08 DIAGNOSIS — Z11.52 ENCOUNTER FOR SCREENING FOR COVID-19: ICD-10-CM

## 2022-01-08 PROBLEM — N18.31 CHRONIC KIDNEY DISEASE, STAGE 3A (H): Status: ACTIVE | Noted: 2022-01-08

## 2022-01-08 LAB — SARS-COV-2 RNA RESP QL NAA+PROBE: NEGATIVE

## 2022-01-08 PROCEDURE — U0003 INFECTIOUS AGENT DETECTION BY NUCLEIC ACID (DNA OR RNA); SEVERE ACUTE RESPIRATORY SYNDROME CORONAVIRUS 2 (SARS-COV-2) (CORONAVIRUS DISEASE [COVID-19]), AMPLIFIED PROBE TECHNIQUE, MAKING USE OF HIGH THROUGHPUT TECHNOLOGIES AS DESCRIBED BY CMS-2020-01-R: HCPCS | Performed by: NURSE PRACTITIONER

## 2022-01-08 RX ORDER — CALCIUM CARBONATE 500 MG/1
1 TABLET, CHEWABLE ORAL EVERY 4 HOURS PRN
COMMUNITY

## 2022-01-08 RX ORDER — FAMOTIDINE 20 MG/1
20 TABLET, FILM COATED ORAL 2 TIMES DAILY
Status: ON HOLD | COMMUNITY
End: 2022-01-19

## 2022-01-08 RX ORDER — ACETAMINOPHEN 500 MG
1000 TABLET ORAL 3 TIMES DAILY
COMMUNITY

## 2022-01-09 ENCOUNTER — LAB REQUISITION (OUTPATIENT)
Dept: LAB | Facility: CLINIC | Age: 62
End: 2022-01-09

## 2022-01-09 DIAGNOSIS — I10 ESSENTIAL (PRIMARY) HYPERTENSION: ICD-10-CM

## 2022-01-09 LAB
BACTERIA BLD CULT: NO GROWTH
BACTERIA BLD CULT: NO GROWTH
SARS-COV-2 RNA RESP QL NAA+PROBE: NEGATIVE

## 2022-01-10 LAB
ANION GAP SERPL CALCULATED.3IONS-SCNC: 7 MMOL/L (ref 3–14)
BUN SERPL-MCNC: 23 MG/DL (ref 7–30)
CALCIUM SERPL-MCNC: 9.1 MG/DL (ref 8.5–10.1)
CHLORIDE BLD-SCNC: 105 MMOL/L (ref 94–109)
CO2 SERPL-SCNC: 25 MMOL/L (ref 20–32)
CREAT SERPL-MCNC: 1.5 MG/DL (ref 0.52–1.04)
ERYTHROCYTE [DISTWIDTH] IN BLOOD BY AUTOMATED COUNT: 13.2 % (ref 10–15)
GAMMA INTERFERON BACKGROUND BLD IA-ACNC: 0.55 IU/ML
GFR SERPL CREATININE-BSD FRML MDRD: 39 ML/MIN/1.73M2
GLUCOSE BLD-MCNC: 103 MG/DL (ref 70–99)
HCT VFR BLD AUTO: 32.6 % (ref 35–47)
HGB BLD-MCNC: 10.4 G/DL (ref 11.7–15.7)
M TB IFN-G BLD-IMP: NEGATIVE
M TB IFN-G CD4+ BCKGRND COR BLD-ACNC: 9.45 IU/ML
MCH RBC QN AUTO: 31.6 PG (ref 26.5–33)
MCHC RBC AUTO-ENTMCNC: 31.9 G/DL (ref 31.5–36.5)
MCV RBC AUTO: 99 FL (ref 78–100)
MITOGEN IGNF BCKGRD COR BLD-ACNC: 0.11 IU/ML
MITOGEN IGNF BCKGRD COR BLD-ACNC: 0.16 IU/ML
PLATELET # BLD AUTO: 140 10E3/UL (ref 150–450)
POTASSIUM BLD-SCNC: 4.6 MMOL/L (ref 3.4–5.3)
QUANTIFERON MITOGEN: 10 IU/ML
QUANTIFERON NIL TUBE: 0.55 IU/ML
QUANTIFERON TB1 TUBE: 0.66 IU/ML
QUANTIFERON TB2 TUBE: 0.71
RBC # BLD AUTO: 3.29 10E6/UL (ref 3.8–5.2)
SODIUM SERPL-SCNC: 137 MMOL/L (ref 133–144)
WBC # BLD AUTO: 6 10E3/UL (ref 4–11)

## 2022-01-10 PROCEDURE — 85014 HEMATOCRIT: CPT | Performed by: NURSE PRACTITIONER

## 2022-01-10 PROCEDURE — 80048 BASIC METABOLIC PNL TOTAL CA: CPT | Performed by: NURSE PRACTITIONER

## 2022-01-10 PROCEDURE — 36415 COLL VENOUS BLD VENIPUNCTURE: CPT | Performed by: NURSE PRACTITIONER

## 2022-01-10 NOTE — PROGRESS NOTES
[facility] GERIATRIC SERVICES  INITIAL VISIT NOTE  January 11, 2022    PRIMARY CARE PROVIDER AND CLINIC:  [facility] [address]  / [place]    CHIEF COMPLAINT:  Hospital follow-up/Initial visit    HPI:    Connie Longoria is a 61 year old  (1960) female who was seen at [facility] on January 11, 2022 for an initial visit.     Medical history is notable for CAD, ischemic cardiomyopathy, hypertension, dyslipidemia, CVA, DM type II, diabetic retinopathy, peripheral neuropathy, CKD, rheumatoid arthritis, GERD, UTI, hepatitis C, depression, glaucoma, and osteoporosis.    Summary of hospital course:  Patient was hospitalized at [facility] from January 3 through January 15, 2022 after presenting with generalized weakness and altered mental status.  EKG revealed atrial flutter/fibrillation with HR of 155, variable AV block, and incomplete left bundle branch block.  Chest x-ray showed no active cardiopulmonary disease.  Echocardiogram showed mild-moderate concentric LV hypertrophy, LVEF of 50-55%, and severe inferolateral and mild inferior/inferoseptal hypokinesis.  BMP was significant for sodium of 126 and creatinine of 1.95.  BNP was 2,406.  Procalcitonin was 0.34.  Troponin I was 280.  TSH was 0.48.  CBC demonstrated white count of 11.1 and hemoglobin of 17.2.  Tests for COVID-19 and influenza were negative.  UA was abnormal.    She was resuscitated with IV fluid, started on IV heparin,  and treated empirically with IV ceftriaxone.  Urine culture grew 10,000-50,000 colonies per mL Enterococcus faecalis.  Blood culture yielded no growth.  She eventually converted to normal sinus rhythm.  IV heparin transitioned to oral apixaban.  Renal function and hyponatremia improved with hydration.  She was started on low-dose Celexa for worsening depression.  TCU recommend her therapies.    Patient is admitted to this facility for medical management, nursing care, and  rehab.     Of note, history was obtained from patient, facility RN, and extensive review of the chart.    Today's visit:  Patient was seen in her room, while lying in bed.  She appears comfortable.  She denies fever, chills, chest pain, palpitation, dyspnea, nausea, vomiting, abdominal pain, dysuria, or urinary frequency.  She states that her depressive symptoms have improved after initiation of medication in the hospital.  She reports that she had a bowel movement this morning.      CODE STATUS:   CPR/Full code     PAST MEDICAL HISTORY:   Atrial fibrillation and flutter (new diagnosis in January 2020)  CAD, s/p CABG x4 in June 2011  History of mild ischemic cardiomyopathy with LVEF of 45% in 2015, now recovered, last LVEF 55% on January 4, 2022  Very mild aortic stenosis with a valve area of 1.7 cm   Hypertension  Dyslipidemia  CVA  Left carotid artery stenosis, s/p left endarterectomy in 2015  DM type II  Diabetic retinopathy (left eye blindness)  Peripheral neuropathy  Gastroparesis  CKD stage IIIa, baseline creatinine 1-1.3  Chronic thrombocytopenia  Rheumatoid arthritis, seropositive  GERD  Hepatitis C with cirrhosis, s/p treatment for 3 months  UTI  Depression  Glaucoma  Vitamin D deficiency  Osteoporosis    Past Medical History:   Diagnosis Date     LAURI (acute kidney injury) (H) 12/16/2015     Atrial fibrillation with RVR (H) 1/3/2022     Congestive heart failure, unspecified      Coronary artery disease      CVA (cerebral vascular accident) (H)      Diabetes (H)      Elevated troponin 12/16/2015     Gastro-oesophageal reflux disease      Glaucoma      Heart attack (H)      Hyperlipidemia      Hypertension      Renal disease     from diabetes and steroid use     Rheumatoid arthritis (H)      Stented coronary artery      Steroid long-term use      Stroke (cerebrum) (H)      Type 2 diabetes mellitus (H) 12/21/2007    Overview:  Severe.  Avastin injection followed by focal photocoagulation     Unspecified  cerebral artery occlusion with cerebral infarction      Vision loss of left eye 3/15/2017       PAST SURGICAL HISTORY:   Past Surgical History:   Procedure Laterality Date     AS CABG, ARTERY-VEIN, FOUR       CARDIAC SURGERY       COLONOSCOPY       CYCLOPHOTOCOAGULATION TRANSSCLERAL WITH MICROPULSE LASER Right 4/17/2019    Procedure: Right Eye Micropulse Cyclophotocoagulation;  Surgeon: Bethany William MD;  [facility]: [facility]     EYE SURGERY Left      EYE SURGERY Right 11/08/2017    shunt placement     IMPLANT VALVE EYE Left 9/18/2015    Procedure: IMPLANT VALVE EYE;  Surgeon: Harlan Harris MD;  Location:  [facility]     IMPLANT VALVE EYE Left 11/3/2015    Procedure: IMPLANT VALVE EYE;  Surgeon: Harlan Harris MD;  Location:  [facility]     REVISE GLAUCOMA SHUNT Left 10/9/2015    Procedure: REVISE GLAUCOMA SHUNT;  Surgeon: Harlan Harris MD;  Location:  [facility]     REVISE GLAUCOMA SHUNT Left 2/23/2016    Procedure: REVISE GLAUCOMA SHUNT;  Surgeon: Harlan Harris MD;  [facility]     VASCULAR SURGERY      carodid endarectomy       FAMILY HISTORY:   Family History   Problem Relation Age of Onset     Diabetes Mother      Glaucoma Mother      Diabetes Brother      Glaucoma Sister      Diabetes Sister      Hypertension No family hx of      Macular Degeneration No family hx of      Retinal detachment No family hx of        SOCIAL HISTORY:  Social History     Tobacco Use     Smoking status: Current Every Day Smoker     Packs/day: 0.20     Types: Cigarettes     Smokeless tobacco: Never Used     Tobacco comment: #2 cigs / day   Substance Use Topics     Alcohol use: No       MEDICATIONS:  Current Outpatient Medications   Medication Sig Dispense Refill     acetaminophen (TYLENOL) 500 MG tablet Take 1,000 mg by mouth 3 times daily       apixaban ANTICOAGULANT (ELIQUIS) 5 MG tablet Take 1 tablet (5 mg) by mouth 2 times daily 60 tablet 0     aspirin 81 MG tablet Take 81 mg by mouth daily       bimatoprost (LUMIGAN) 0.01 %  "SOLN Place 1 drop into the right eye At Bedtime       brimonidine-timolol (COMBIGAN) 0.2-0.5 % ophthalmic solution Place 1 drop into the right eye 2 times daily       calcium carbonate (TUMS) 500 MG chewable tablet Take 1 chew tab by mouth every 4 hours as needed for heartburn       citalopram (CELEXA) 10 MG tablet Take 1 tablet (10 mg) by mouth daily 30 tablet 0     dorzolamide (TRUSOPT) 2 % ophthalmic solution Place 1 drop into the right eye 2 times daily       famotidine (PEPCID) 20 MG tablet Take 20 mg by mouth 2 times daily       gabapentin (NEURONTIN) 600 MG tablet Take 600 mg by mouth 3 times daily       insulin glargine (LANTUS PEN) 100 UNIT/ML pen Inject 25 Units Subcutaneous At Bedtime 15 mL      LISINOPRIL PO Take 10 mg by mouth daily       metoprolol succinate ER (TOPROL-XL) 25 MG 24 hr tablet Take 25 mg by mouth daily       simvastatin (ZOCOR) 20 MG tablet Take 20 mg by mouth At Bedtime       VITAMIN D PO Take 1 tablet by mouth daily OTC: Patient unsure of strength         ALLERGIES:  Allergies   Allergen Reactions     Ascorbate      Other reaction(s): Angioedema  Mouth swells     Atorvastatin      Denies allergy     Effexor [Venlafaxine] Nausea and Vomiting     PN: LW Reaction: gi upset     Other  [No Clinical Screening - See Comments]      Mouth swells  Other reaction(s): Angioedema  PN: LW Other1: -all berry     Rosuvastatin Other (See Comments)     ALT elevated  Alt elevated  Other reaction(s): Other - Describe In Comment Field  ALT elevated     Wellbutrin [Bupropion] Other (See Comments)     Severe HTN even on meds when on wellbutrin  Other reaction(s): Hypertension       ROS:  10 point ROS were negative other than the symptoms noted above in the HPI.    PHYSICAL EXAM:  Vital signs were reviewed in the chart.  Vital Signs: BP 99/55   Pulse 53   Temp 97.6  F (36.4  C)   Resp 18   Ht 1.626 m (5' 4\")   Wt 87.2 kg (192 lb 3.2 oz)   SpO2 100%   BMI 32.99 kg/m    General: Comfortable and in no " acute distress  HEENT: No conjunctival pallor  Cardiovascular: Normal S1, S2, RRR  Respiratory: Lungs clear to auscultation bilaterally  GI: Abdomen soft, non-tender, non-distended, +BS  Extremities: No LE edema  Neuro: CX II-XII grossly intact; ROM in all four extremities grossly intact  Psych: Alert and oriented x3; normal affect  Skin: No acute rash    LABORATORY/IMAGING DATA:  All relevant labs and imaging data were reviewed personally today.      Most Recent 3 CBC's:Recent Labs   Lab Test 01/06/22  0534 01/04/22  0700 01/03/22 2038   WBC 8.1 9.9 11.1*   HGB 12.2 14.1 17.2*   MCV 93 93 93   * 169 205     Most Recent 3 BMP's:Recent Labs   Lab Test 01/06/22  0730 01/05/22  2107 01/05/22  1655 01/05/22  0757 01/05/22  0540 01/04/22  0735 01/04/22  0700 01/03/22  2304 01/03/22 2038   NA  --   --   --   --  135  --  134  --  126*   POTASSIUM  --   --   --   --  3.2*  --  3.4  --  4.0   CHLORIDE  --   --   --   --  103  --  99  --  87*   CO2  --   --   --   --  27  --  27  --  25   BUN  --   --   --   --  42*  --  61*  --  77*   CR  --   --   --   --  1.26*  --  1.56*  --  1.95*   ANIONGAP  --   --   --   --  5  --  8  --  14   THOMAS  --   --   --   --  9.0  --  8.5  --  10.6*   GLC 64* 239* 243*   < > 137*   < > 228*   < > 384*    < > = values in this interval not displayed.     Most Recent 2 LFT's:Recent Labs   Lab Test 01/04/22 0700 01/03/22 2038   AST 33 29   ALT 19 21   ALKPHOS 54 70   BILITOTAL 0.5 0.7     Most Recent 3 Troponin's:No lab results found.  Most Recent 3 BNP's:Recent Labs   Lab Test 01/03/22 2038   NTBNPI 2,406*     Most Recent Cholesterol Panel:No lab results found.  Most Recent 6 Bacteria Isolates From Any Culture (See EPIC Reports for Culture Details):No lab results found.  Most Recent TSH and T4:Recent Labs   Lab Test 01/04/22  1200   TSH 0.48     Most Recent Hemoglobin A1c:Recent Labs   Lab Test 01/03/22  2038   A1C 6.4*     Most Recent 6 glucoses:Recent Labs   Lab Test 01/06/22  0730  01/05/22  2107 01/05/22  1655 01/05/22  1241 01/05/22  0757 01/05/22  0540   GLC 64* 239* 243* 255* 130* 137*         ASSESSMENT/PLAN:  Atrial fibrillation and flutter with RVR, new diagnosis.  Converted to normal sinus rhythm.  Started on chronic anticoagulation.  Plan:  Continue metoprolol ER 25 mg p.o. daily  Continue anticoagulation with apixaban 5 mg p.o. twice daily  Monitor heart rate  Follow-up with cardiology as instructed    CAD, s/p CABG x4 in June 2011,  Demand ischemia, due to atrial fibrillation,  History of mild ischemic cardiomyopathy with LVEF of 45% in 2015, now recovered, last LVEF 55% on January 4, 2022,  Very mild aortic stenosis with a valve area of 1.7 cm ,  Hypertension,  Dyslipidemia.  Appears stable.  She currently weighs 192.2 LBS.  Plan:  Continue aspirin 81 mg p.o. daily, metoprolol ER 25 mg p.o. daily, lisinopril 10 mg p.o. daily, and simvastatin 20 mg p.o. at bedtime  Monitor weights, volume, blood pressure, and cardiac status    Suspected UTI.  Patient was treated with IV ceftriaxone while inpatient.  Urine culture grew 10,000-50,000 colonies/ mL Enterococcus faecalis but patient had no urinary symptoms and therefore IV ceftriaxone was discontinued and no other antibiotic therapy was initiated.  At this juncture she is asymptomatic.  Plan:  Monitor UTI symptoms    History of CVA,  History of left carotid artery stenosis, s/p left endarterectomy in 2015.  Plan:  Continue aspirin 81 mg p.o. daily and simvastatin 20 mg p.o. daily  Continue anticoagulation with apixaban as above    DM type II,  Diabetic retinopathy (left eye blindness),  Peripheral neuropathy,  Gastroparesis.  Last hemoglobin A1c was 6.4% on January 3, 2022.  PTA PRN metoclopramide was discontinued in TCU.  Blood glucose levels are in the range of .  Plan:  Continue diabetic diet  Continue insulin glargine 25 units subcu at bedtime  Continue gabapentin 600 mg p.o. 3 times daily  Monitor blood glucose  Adjust insulin  regimen as indicated    LAURI, improved,  CKD stage IIIa.  Baseline creatinine 1-1.3.  Peak creatinine of 1.95 on January 3.  Last creatinine was 1.5 on January 10.  Plan:  Avoid NSAIDs and nephrotoxins  Monitor renal function periodically    History of seropositive rheumatoid arthritis.  Patient states that she takes an oral medication once a week and receives infusion every 7-8 weeks.  She follows with Dr. Grove.  After extensive EPIC review, it is still unclear to me which medications she takes for rheumatoid arthritis.  Plan:  Asked staff to verify RA meds from rheumatology clinic  Follow-up with Dr. Grove as outpatient    History of hepatitis C.  Chronic thrombocytopenia  She is status post treatment for 3 months, 6 to 7 years ago.  Normal LFT on January 3.  Last platelets 130,000 on January 6.  Plan:  Monitor platelets periodically  Follow-up as outpatient    GERD.  Started on famotidine in TCU.  Plan:  Continue famotidine 20 mg p.o. twice daily  Continue as needed Tums    Recurrent major depression.  Started on Celexa in the hospital.  Depressive symptoms are improving.  Plan:  Continue Celexa 10 mg p.o. daily  Monitor mood  Refer to ACP as needed    Glaucoma.  Plan:  Continue PTA ophthalmic drops including Combigan, Lumigan, and Trusopt    Physical deconditioning.  Plan:  Continue PT/OT evaluation and therapy        Orders written by provider at facility:  Recheck BMP January 14, Dx: Renal failure        Recommendation by provider at facility:  None.          Electronically signed by:  Jv Phillip MD

## 2022-01-11 ENCOUNTER — TRANSITIONAL CARE UNIT VISIT (OUTPATIENT)
Dept: GERIATRICS | Facility: CLINIC | Age: 62
End: 2022-01-11
Payer: MEDICARE

## 2022-01-11 ENCOUNTER — LAB REQUISITION (OUTPATIENT)
Dept: LAB | Facility: CLINIC | Age: 62
End: 2022-01-11

## 2022-01-11 VITALS
OXYGEN SATURATION: 99 % | DIASTOLIC BLOOD PRESSURE: 65 MMHG | SYSTOLIC BLOOD PRESSURE: 99 MMHG | RESPIRATION RATE: 18 BRPM | WEIGHT: 194.4 LBS | BODY MASS INDEX: 33.37 KG/M2 | HEART RATE: 70 BPM | TEMPERATURE: 97.3 F

## 2022-01-11 VITALS
WEIGHT: 192.2 LBS | TEMPERATURE: 97.6 F | HEIGHT: 64 IN | BODY MASS INDEX: 32.81 KG/M2 | OXYGEN SATURATION: 100 % | HEART RATE: 53 BPM | RESPIRATION RATE: 18 BRPM | SYSTOLIC BLOOD PRESSURE: 99 MMHG | DIASTOLIC BLOOD PRESSURE: 55 MMHG

## 2022-01-11 DIAGNOSIS — Z79.4 TYPE 2 DIABETES MELLITUS WITH STAGE 3A CHRONIC KIDNEY DISEASE, WITH LONG-TERM CURRENT USE OF INSULIN (H): ICD-10-CM

## 2022-01-11 DIAGNOSIS — Z86.73 HISTORY OF CVA (CEREBROVASCULAR ACCIDENT): ICD-10-CM

## 2022-01-11 DIAGNOSIS — N39.0 URINARY TRACT INFECTION WITHOUT HEMATURIA, SITE UNSPECIFIED: ICD-10-CM

## 2022-01-11 DIAGNOSIS — K21.9 GASTROESOPHAGEAL REFLUX DISEASE, UNSPECIFIED WHETHER ESOPHAGITIS PRESENT: ICD-10-CM

## 2022-01-11 DIAGNOSIS — E11.22 TYPE 2 DIABETES MELLITUS WITH STAGE 3A CHRONIC KIDNEY DISEASE, WITH LONG-TERM CURRENT USE OF INSULIN (H): ICD-10-CM

## 2022-01-11 DIAGNOSIS — I48.91 ATRIAL FIBRILLATION, UNSPECIFIED TYPE (H): Primary | ICD-10-CM

## 2022-01-11 DIAGNOSIS — E78.5 DYSLIPIDEMIA: ICD-10-CM

## 2022-01-11 DIAGNOSIS — I10 ESSENTIAL HYPERTENSION: ICD-10-CM

## 2022-01-11 DIAGNOSIS — I25.10 CORONARY ARTERY DISEASE INVOLVING NATIVE CORONARY ARTERY OF NATIVE HEART WITHOUT ANGINA PECTORIS: ICD-10-CM

## 2022-01-11 DIAGNOSIS — R53.81 PHYSICAL DECONDITIONING: ICD-10-CM

## 2022-01-11 DIAGNOSIS — I24.89 DEMAND ISCHEMIA (H): ICD-10-CM

## 2022-01-11 DIAGNOSIS — Z00.01 ENCOUNTER FOR GENERAL ADULT MEDICAL EXAMINATION WITH ABNORMAL FINDINGS: ICD-10-CM

## 2022-01-11 DIAGNOSIS — Z86.19 HISTORY OF HEPATITIS C: ICD-10-CM

## 2022-01-11 DIAGNOSIS — N18.31 TYPE 2 DIABETES MELLITUS WITH STAGE 3A CHRONIC KIDNEY DISEASE, WITH LONG-TERM CURRENT USE OF INSULIN (H): ICD-10-CM

## 2022-01-11 DIAGNOSIS — D69.6 THROMBOCYTOPENIA (H): ICD-10-CM

## 2022-01-11 DIAGNOSIS — F33.9 EPISODE OF RECURRENT MAJOR DEPRESSIVE DISORDER, UNSPECIFIED DEPRESSION EPISODE SEVERITY (H): ICD-10-CM

## 2022-01-11 DIAGNOSIS — M05.9 SEROPOSITIVE RHEUMATOID ARTHRITIS (H): ICD-10-CM

## 2022-01-11 PROCEDURE — 99306 1ST NF CARE HIGH MDM 50: CPT | Performed by: INTERNAL MEDICINE

## 2022-01-11 PROCEDURE — U0005 INFEC AGEN DETEC AMPLI PROBE: HCPCS | Performed by: NURSE PRACTITIONER

## 2022-01-11 RX ORDER — BRIMONIDINE TARTRATE 2 MG/ML
1 SOLUTION/ DROPS OPHTHALMIC EVERY 12 HOURS
Qty: 15 ML | Status: CANCELLED | OUTPATIENT
Start: 2022-01-11

## 2022-01-11 ASSESSMENT — MIFFLIN-ST. JEOR: SCORE: 1416.81

## 2022-01-11 NOTE — TELEPHONE ENCOUNTER
6-2021 telephone encounter notes pt seeing another eye provider     Another encounter stated [facility] was [facility] and updated not seeing eye provider with [facility]       Rx denied    Mason Williamson RN 7:01 AM 01/11/22

## 2022-01-11 NOTE — TELEPHONE ENCOUNTER
brimonidine (ALPHAGAN) 0.2 % ophthalmic solution  Last Written Prescription Date:  ?  Last Fill Quantity: ?,   # refills: ?  Last Office Visit : 6/23/2020  Future Office visit:  None     Bethany William MD    Ophthalmology     Patient will discontinue and hold onto the Diamox 250mg pills.  DO NOT USE THESE PILLS.     Patient will also continue Genteal gel 4-6x/day in the left eye.         Monocular precautions discussion.  Patient will continue on Cosopt (Timolol/Dorzolamide) which is a blue top drop 2x/day (12 hours apart) in the RIGHT EYE ONLY and Alphagan (Brimonidine) which is a purple top drop 2x/day (12 hours apart) in the RIGHT EYE ONLY, and Travatan which is a teal top drop at bedtime in the RIGHT EYE ONLY.  Patient will return to clinic in 4-6 months with visual field test, dilated fundus exam, disc photos and IOP check.         Routing refill request to provider for review/approval because:  Drug not active on patient's medication list  Dr. William gone.    Pt needs to establish care with new Provider.      Please call Pt to schedule.    Thank you     Jo Ann Barkley RN  Central Triage Red Flags/Med Refills

## 2022-01-11 NOTE — LETTER
1/11/2022        RE: Connie Longoria  [address]  [facility] [address]        [place] [facility]  INITIAL VISIT NOTE  January 11, 2022    PRIMARY CARE PROVIDER AND CLINIC:  [facility] [address]  / [place]    CHIEF COMPLAINT:  Hospital follow-up/Initial visit    HPI:    Connie Longoria is a 61 year old  (1960) female who was seen at [place] [facility] on January 11, 2022 for an initial visit.     Medical history is notable for CAD, ischemic cardiomyopathy, hypertension, dyslipidemia, CVA, DM type II, diabetic retinopathy, peripheral neuropathy, CKD, rheumatoid arthritis, GERD, UTI, hepatitis C, depression, glaucoma, and osteoporosis.    Summary of hospital course:  Patient was hospitalized at [facility] from January 3 through January 15, 2022 after presenting with generalized weakness and altered mental status.  EKG revealed atrial flutter/fibrillation with HR of 155, variable AV block, and incomplete left bundle branch block.  Chest x-ray showed no active cardiopulmonary disease.  Echocardiogram showed mild-moderate concentric LV hypertrophy, LVEF of 50-55%, and severe inferolateral and mild inferior/inferoseptal hypokinesis.  BMP was significant for sodium of 126 and creatinine of 1.95.  BNP was 2,406.  Procalcitonin was 0.34.  Troponin I was 280.  TSH was 0.48.  CBC demonstrated white count of 11.1 and hemoglobin of 17.2.  Tests for COVID-19 and influenza were negative.  UA was abnormal.    She was resuscitated with IV fluid, started on IV heparin,  and treated empirically with IV ceftriaxone.  Urine culture grew 10,000-50,000 colonies per mL Enterococcus faecalis.  Blood culture yielded no growth.  She eventually converted to normal sinus rhythm.  IV heparin transitioned to oral apixaban.  Renal function and hyponatremia improved with hydration.  She was started on low-dose Celexa for worsening depression.  TCU  recommend her therapies.    Patient is admitted to this facility for medical management, nursing care, and rehab.     Of note, history was obtained from patient, facility RN, and extensive review of the chart.    Today's visit:  Patient was seen in her room, while lying in bed.  She appears comfortable.  She denies fever, chills, chest pain, palpitation, dyspnea, nausea, vomiting, abdominal pain, dysuria, or urinary frequency.  She states that her depressive symptoms have improved after initiation of medication in the hospital.  She reports that she had a bowel movement this morning.      CODE STATUS:   CPR/Full code     PAST MEDICAL HISTORY:   Atrial fibrillation and flutter (new diagnosis in January 2020)  CAD, s/p CABG x4 in June 2011  History of mild ischemic cardiomyopathy with LVEF of 45% in 2015, now recovered, last LVEF 55% on January 4, 2022  Very mild aortic stenosis with a valve area of 1.7 cm   Hypertension  Dyslipidemia  CVA  Left carotid artery stenosis, s/p left endarterectomy in 2015  DM type II  Diabetic retinopathy (left eye blindness)  Peripheral neuropathy  Gastroparesis  CKD stage IIIa, baseline creatinine 1-1.3  Chronic thrombocytopenia  Rheumatoid arthritis, seropositive  GERD  Hepatitis C with cirrhosis, s/p treatment for 3 months  UTI  Depression  Glaucoma  Vitamin D deficiency  Osteoporosis    Past Medical History:   Diagnosis Date     LAURI (acute kidney injury) (H) 12/16/2015     Atrial fibrillation with RVR (H) 1/3/2022     Congestive heart failure, unspecified      Coronary artery disease      CVA (cerebral vascular accident) (H)      Diabetes (H)      Elevated troponin 12/16/2015     Gastro-oesophageal reflux disease      Glaucoma      Heart attack (H)      Hyperlipidemia      Hypertension      Renal disease     from diabetes and steroid use     Rheumatoid arthritis (H)      Stented coronary artery      Steroid long-term use      Stroke (cerebrum) (H)      Type 2 diabetes mellitus (H)  12/21/2007    Overview:  Severe.  Avastin injection followed by focal photocoagulation     Unspecified cerebral artery occlusion with cerebral infarction      Vision loss of left eye 3/15/2017       PAST SURGICAL HISTORY:   Past Surgical History:   Procedure Laterality Date     AS CABG, ARTERY-VEIN, FOUR       CARDIAC SURGERY       COLONOSCOPY       CYCLOPHOTOCOAGULATION TRANSSCLERAL WITH MICROPULSE LASER Right 4/17/2019    Procedure: Right Eye Micropulse Cyclophotocoagulation;  Surgeon: Bethany William MD;  Location: [facility] OR     EYE SURGERY Left      EYE SURGERY Right 11/08/2017    shunt placement     IMPLANT VALVE EYE Left 9/18/2015    Procedure: IMPLANT VALVE EYE;  Surgeon: Harlan Harris MD;  Location:  [facility]     IMPLANT VALVE EYE Left 11/3/2015    Procedure: IMPLANT VALVE EYE;  Surgeon: Harlan Harris MD;  Location: [facility]     REVISE GLAUCOMA SHUNT Left 10/9/2015    Procedure: REVISE GLAUCOMA SHUNT;  Surgeon: Harlan Harris MD;  Location: [facility]     REVISE GLAUCOMA SHUNT Left 2/23/2016    Procedure: REVISE GLAUCOMA SHUNT;  Surgeon: Harlan Harris MD;  Location: [facility]     VASCULAR SURGERY      carodid endarectomy       FAMILY HISTORY:   Family History   Problem Relation Age of Onset     Diabetes Mother      Glaucoma Mother      Diabetes Brother      Glaucoma Sister      Diabetes Sister      Hypertension No family hx of      Macular Degeneration No family hx of      Retinal detachment No family hx of        SOCIAL HISTORY:  Social History     Tobacco Use     Smoking status: Current Every Day Smoker     Packs/day: 0.20     Types: Cigarettes     Smokeless tobacco: Never Used     Tobacco comment: #2 cigs / day   Substance Use Topics     Alcohol use: No       MEDICATIONS:  Current Outpatient Medications   Medication Sig Dispense Refill     acetaminophen (TYLENOL) 500 MG tablet Take 1,000 mg by mouth 3 times daily       apixaban ANTICOAGULANT (ELIQUIS) 5 MG tablet Take 1 tablet (5 mg) by mouth 2 times  daily 60 tablet 0     aspirin 81 MG tablet Take 81 mg by mouth daily       bimatoprost (LUMIGAN) 0.01 % SOLN Place 1 drop into the right eye At Bedtime       brimonidine-timolol (COMBIGAN) 0.2-0.5 % ophthalmic solution Place 1 drop into the right eye 2 times daily       calcium carbonate (TUMS) 500 MG chewable tablet Take 1 chew tab by mouth every 4 hours as needed for heartburn       citalopram (CELEXA) 10 MG tablet Take 1 tablet (10 mg) by mouth daily 30 tablet 0     dorzolamide (TRUSOPT) 2 % ophthalmic solution Place 1 drop into the right eye 2 times daily       famotidine (PEPCID) 20 MG tablet Take 20 mg by mouth 2 times daily       gabapentin (NEURONTIN) 600 MG tablet Take 600 mg by mouth 3 times daily       insulin glargine (LANTUS PEN) 100 UNIT/ML pen Inject 25 Units Subcutaneous At Bedtime 15 mL      LISINOPRIL PO Take 10 mg by mouth daily       metoprolol succinate ER (TOPROL-XL) 25 MG 24 hr tablet Take 25 mg by mouth daily       simvastatin (ZOCOR) 20 MG tablet Take 20 mg by mouth At Bedtime       VITAMIN D PO Take 1 tablet by mouth daily OTC: Patient unsure of strength         ALLERGIES:  Allergies   Allergen Reactions     Ascorbate      Other reaction(s): Angioedema  Mouth swells     Atorvastatin      Denies allergy     Effexor [Venlafaxine] Nausea and Vomiting     PN: LW Reaction: gi upset     Other  [No Clinical Screening - See Comments]      Mouth swells  Other reaction(s): Angioedema  PN: LW Other1: -all berry     Rosuvastatin Other (See Comments)     ALT elevated  Alt elevated  Other reaction(s): Other - Describe In Comment Field  ALT elevated     Wellbutrin [Bupropion] Other (See Comments)     Severe HTN even on meds when on wellbutrin  Other reaction(s): Hypertension       ROS:  10 point ROS were negative other than the symptoms noted above in the HPI.    PHYSICAL EXAM:  Vital signs were reviewed in the chart.  Vital Signs: BP 99/55   Pulse 53   Temp 97.6  F (36.4  C)   Resp 18   Ht 1.626 m  "(5' 4\")   Wt 87.2 kg (192 lb 3.2 oz)   SpO2 100%   BMI 32.99 kg/m    General: Comfortable and in no acute distress  HEENT: No conjunctival pallor  Cardiovascular: Normal S1, S2, RRR  Respiratory: Lungs clear to auscultation bilaterally  GI: Abdomen soft, non-tender, non-distended, +BS  Extremities: No LE edema  Neuro: CX II-XII grossly intact; ROM in all four extremities grossly intact  Psych: Alert and oriented x3; normal affect  Skin: No acute rash    LABORATORY/IMAGING DATA:  All relevant labs and imaging data were reviewed personally today.      Most Recent 3 CBC's:Recent Labs   Lab Test 01/06/22  0534 01/04/22  0700 01/03/22 2038   WBC 8.1 9.9 11.1*   HGB 12.2 14.1 17.2*   MCV 93 93 93   * 169 205     Most Recent 3 BMP's:Recent Labs   Lab Test 01/06/22  0730 01/05/22  2107 01/05/22  1655 01/05/22  0757 01/05/22  0540 01/04/22  0735 01/04/22  0700 01/03/22  2304 01/03/22 2038   NA  --   --   --   --  135  --  134  --  126*   POTASSIUM  --   --   --   --  3.2*  --  3.4  --  4.0   CHLORIDE  --   --   --   --  103  --  99  --  87*   CO2  --   --   --   --  27  --  27  --  25   BUN  --   --   --   --  42*  --  61*  --  77*   CR  --   --   --   --  1.26*  --  1.56*  --  1.95*   ANIONGAP  --   --   --   --  5  --  8  --  14   THOMAS  --   --   --   --  9.0  --  8.5  --  10.6*   GLC 64* 239* 243*   < > 137*   < > 228*   < > 384*    < > = values in this interval not displayed.     Most Recent 2 LFT's:Recent Labs   Lab Test 01/04/22  0700 01/03/22 2038   AST 33 29   ALT 19 21   ALKPHOS 54 70   BILITOTAL 0.5 0.7     Most Recent 3 Troponin's:No lab results found.  Most Recent 3 BNP's:Recent Labs   Lab Test 01/03/22  2038   NTBNPI 2,406*     Most Recent Cholesterol Panel:No lab results found.  Most Recent 6 Bacteria Isolates From Any Culture (See EPIC Reports for Culture Details):No lab results found.  Most Recent TSH and T4:Recent Labs   Lab Test 01/04/22  1200   TSH 0.48     Most Recent Hemoglobin A1c:Recent " Labs   Lab Test 01/03/22 2038   A1C 6.4*     Most Recent 6 glucoses:Recent Labs   Lab Test 01/06/22  0730 01/05/22  2107 01/05/22  1655 01/05/22  1241 01/05/22  0757 01/05/22  0540   GLC 64* 239* 243* 255* 130* 137*         ASSESSMENT/PLAN:  Atrial fibrillation and flutter with RVR, new diagnosis.  Converted to normal sinus rhythm.  Started on chronic anticoagulation.  Plan:  Continue metoprolol ER 25 mg p.o. daily  Continue anticoagulation with apixaban 5 mg p.o. twice daily  Monitor heart rate  Follow-up with cardiology as instructed    CAD, s/p CABG x4 in June 2011,  Demand ischemia, due to atrial fibrillation,  History of mild ischemic cardiomyopathy with LVEF of 45% in 2015, now recovered, last LVEF 55% on January 4, 2022,  Very mild aortic stenosis with a valve area of 1.7 cm ,  Hypertension,  Dyslipidemia.  Appears stable.  She currently weighs 192.2 LBS.  Plan:  Continue aspirin 81 mg p.o. daily, metoprolol ER 25 mg p.o. daily, lisinopril 10 mg p.o. daily, and simvastatin 20 mg p.o. at bedtime  Monitor weights, volume, blood pressure, and cardiac status    Suspected UTI.  Patient was treated with IV ceftriaxone while inpatient.  Urine culture grew 10,000-50,000 colonies/ mL Enterococcus faecalis but patient had no urinary symptoms and therefore IV ceftriaxone was discontinued and no other antibiotic therapy was initiated.  At this juncture she is asymptomatic.  Plan:  Monitor UTI symptoms    History of CVA,  History of left carotid artery stenosis, s/p left endarterectomy in 2015.  Plan:  Continue aspirin 81 mg p.o. daily and simvastatin 20 mg p.o. daily  Continue anticoagulation with apixaban as above    DM type II,  Diabetic retinopathy (left eye blindness),  Peripheral neuropathy,  Gastroparesis.  Last hemoglobin A1c was 6.4% on January 3, 2022.  PTA PRN metoclopramide was discontinued in TCU.  Blood glucose levels are in the range of .  Plan:  Continue diabetic diet  Continue insulin glargine 25  units subcu at bedtime  Continue gabapentin 600 mg p.o. 3 times daily  Monitor blood glucose  Adjust insulin regimen as indicated    LAURI, improved,  CKD stage IIIa.  Baseline creatinine 1-1.3.  Peak creatinine of 1.95 on January 3.  Last creatinine was 1.5 on January 10.  Plan:  Avoid NSAIDs and nephrotoxins  Monitor renal function periodically    History of seropositive rheumatoid arthritis.  Patient states that she takes an oral medication once a week and receives infusion every 7-8 weeks.  She follows with Dr. Grove.  After extensive EPIC review, it is still unclear to me which medications she takes for rheumatoid arthritis.  Plan:  Asked staff to verify RA meds from rheumatology clinic  Follow-up with Dr. Grove as outpatient    History of hepatitis C.  Chronic thrombocytopenia  She is status post treatment for 3 months, 6 to 7 years ago.  Normal LFT on January 3.  Last platelets 130,000 on January 6.  Plan:  Monitor platelets periodically  Follow-up as outpatient    GERD.  Started on famotidine in TCU.  Plan:  Continue famotidine 20 mg p.o. twice daily  Continue as needed Tums    Recurrent major depression.  Started on Celexa in the hospital.  Depressive symptoms are improving.  Plan:  Continue Celexa 10 mg p.o. daily  Monitor mood  Refer to ACP as needed    Glaucoma.  Plan:  Continue PTA ophthalmic drops including Combigan, Lumigan, and Trusopt    Physical deconditioning.  Plan:  Continue PT/OT evaluation and therapy        Orders written by provider at facility:  Recheck BMP January 14, Dx: Renal failure        Recommendation by provider at facility:  None.          Electronically signed by:  vJ Phillip MD                          Sincerely,        Jv Phillip MD

## 2022-01-12 ENCOUNTER — TRANSITIONAL CARE UNIT VISIT (OUTPATIENT)
Dept: GERIATRICS | Facility: CLINIC | Age: 62
End: 2022-01-12
Payer: MEDICARE

## 2022-01-12 DIAGNOSIS — E11.22 TYPE 2 DIABETES MELLITUS WITH STAGE 3A CHRONIC KIDNEY DISEASE, WITH LONG-TERM CURRENT USE OF INSULIN (H): ICD-10-CM

## 2022-01-12 DIAGNOSIS — E11.43 DIABETIC GASTROPARESIS (H): Primary | ICD-10-CM

## 2022-01-12 DIAGNOSIS — I48.91 ATRIAL FIBRILLATION WITH RVR (H): ICD-10-CM

## 2022-01-12 DIAGNOSIS — I15.2 HYPERTENSION DUE TO ENDOCRINE DISORDER: ICD-10-CM

## 2022-01-12 DIAGNOSIS — Z79.4 TYPE 2 DIABETES MELLITUS WITH STAGE 3A CHRONIC KIDNEY DISEASE, WITH LONG-TERM CURRENT USE OF INSULIN (H): ICD-10-CM

## 2022-01-12 DIAGNOSIS — N18.31 TYPE 2 DIABETES MELLITUS WITH STAGE 3A CHRONIC KIDNEY DISEASE, WITH LONG-TERM CURRENT USE OF INSULIN (H): ICD-10-CM

## 2022-01-12 DIAGNOSIS — K21.9 CHRONIC GERD: ICD-10-CM

## 2022-01-12 DIAGNOSIS — K31.84 DIABETIC GASTROPARESIS (H): Primary | ICD-10-CM

## 2022-01-12 LAB — SARS-COV-2 RNA RESP QL NAA+PROBE: NEGATIVE

## 2022-01-12 PROCEDURE — 99309 SBSQ NF CARE MODERATE MDM 30: CPT | Performed by: NURSE PRACTITIONER

## 2022-01-12 NOTE — PROGRESS NOTES
[facility]ATR[facility] SERVICES    Chief Complaint   Patient presents with     RECHECK     HPI:  Connie Longoria is a 61 year old  (1960), who is being seen today for an episodic care visit at: [facility] (TCU) [[id number]].     This is a 61-year-old female who presented with generalized weakness, no TIAs, severe nausea and presyncope.  She was found to be in A. fib with RVR and dehydrated.  She was started on metoprolol and converted to normal sinus rhythm.  She was started on Eliquis at discharge.  Apparently not appropriate for ECV per prolonged time in A. Fib, will follow up with cardiology/EPS outpatient.  She has suspected UTI though minimal Enterococcus grew so antibiotics discontinued.  She developed ARF, improved with NSR, restarted on lisinopril due to h/o DM/HTN/CAD/ HFrEF.  Also had hyponatremia per dehydration, corrected with IVF.  Per diabetes her PTA Lantus was decreased per hypotension.  No other changes noticed [facility]    Today's concern is:   Hypotension, hypoglycemia, GERD    Allergies, and PMH/PSH reviewed in [facility] today.  REVIEW OF SYSTEMS:  4 point ROS including Respiratory, CV, GI and , other than that noted in the HPI,  is negative    Objective:   BP 99/65   Pulse 70   Temp 97.3  F (36.3  C)   Resp 18   Wt 88.2 kg (194 lb 6.4 oz)   SpO2 99%   BMI 33.37 kg/m    GENERAL APPEARANCE:  Alert, oriented, morbidly obese, cooperative, L shoulder pain improved  RESP:  respiratory effort and palpation of chest normal, lungs clear to auscultation , no respiratory distress  CV:  Palpation and auscultation of heart done , regular rate and rhythm, no murmur, rub, or gallop, no edema  ABDOMEN:  normal bowel sounds, soft, nontender, no hepatosplenomegaly or other masses, GERD improved  PSYCH:  oriented X 3, memory impaired. SLUMS 22/30      Most Recent 3 CBC's:Recent Labs   Lab Test 01/06/22  0534 01/04/22  0700 01/03/22  2038   WBC 8.1 9.9 11.1*   HGB 12.2 14.1 17.2*    MCV 93 93 93   * 169 205     Most Recent 3 BMP's:Recent Labs   Lab Test 01/06/22  0730 01/05/22  2107 01/05/22  1655 01/05/22  0757 01/05/22  0540 01/04/22  0735 01/04/22  0700 01/03/22  2304 01/03/22 2038   NA  --   --   --   --  135  --  134  --  126*   POTASSIUM  --   --   --   --  3.2*  --  3.4  --  4.0   CHLORIDE  --   --   --   --  103  --  99  --  87*   CO2  --   --   --   --  27  --  27  --  25   BUN  --   --   --   --  42*  --  61*  --  77*   CR  --   --   --   --  1.26*  --  1.56*  --  1.95*   ANIONGAP  --   --   --   --  5  --  8  --  14   THOMAS  --   --   --   --  9.0  --  8.5  --  10.6*   GLC 64* 239* 243*   < > 137*   < > 228*   < > 384*    < > = values in this interval not displayed.     Most Recent TSH and T4:Recent Labs   Lab Test 01/04/22  1200   TSH 0.48     Most Recent Hemoglobin A1c:Recent Labs   Lab Test 01/03/22 2038   A1C 6.4*       Assessment/Plan:    (I48.91) Atrial fibrillation with RVR (H)  (I15.2) Hypertension due to endocrine disorder  Converted to normal sinus rhythm with metoprolol succinate 25 mg daily and today per lower evening Bps will decrease lisinopril to 5mg daily, follow-up with cardiology/EP per ECV. CTM     (I63.9) Cerebrovascular accident (CVA), unspecified mechanism (H)  (primary encounter diagnosis)  History of A. fib  Continue statin and low-dose aspirin.  Recently started on apixaban 5 mg twice daily     (E11.22,  N18.31,  Z79.4) Type 2 diabetes mellitus with stage 3a chronic kidney disease, with long-term current use of insulin (H)  Last A1c 6.4 on 1/3/2022, BS in 80-90 in AM will decrease Lantus to 20 units at bedtime. BS checks BID for now     (K21.9) Chronic GERD  Probable gastroparesis  Discontinued Reglan. Continue famotidine 20 mg twice daily and Tums 500 mg every 4 hours as needed.  Plan to decrease H2B per renal fx if tx effective     (B18.2) Chronic hepatitis C without hepatic coma (H)  Not on current treatment     (N18.31) Chronic kidney disease,  stage 3a (H)  Last Cr 1.50 with GFR 39 on 1/10, encourage fluids     (E87.6) Hypokalemia  Last K 4.6 on 1/10     (D64.9) Normocytic anemia  Last Hgb 10.4 on 1/10     Thrombocytopenia  Last platelet 140 on 1/10     Depression  Continue citalopram 10 mg daily     Glaucoma  Continue Lumigan and Combivent and Trusopt drops    Therapy  TUG 8, FAULKNER 51/56. Ambulates 350ft with FWW, SBA    Orders:  Decrease lisinopril and lantus    Electronically signed by: Tavares Box NP

## 2022-01-13 ENCOUNTER — LAB REQUISITION (OUTPATIENT)
Dept: LAB | Facility: CLINIC | Age: 62
End: 2022-01-13

## 2022-01-13 VITALS
SYSTOLIC BLOOD PRESSURE: 142 MMHG | HEART RATE: 50 BPM | DIASTOLIC BLOOD PRESSURE: 82 MMHG | WEIGHT: 188.5 LBS | RESPIRATION RATE: 16 BRPM | BODY MASS INDEX: 32.36 KG/M2 | OXYGEN SATURATION: 100 % | TEMPERATURE: 97.2 F

## 2022-01-13 DIAGNOSIS — N17.9 ACUTE KIDNEY FAILURE, UNSPECIFIED (H): ICD-10-CM

## 2022-01-14 ENCOUNTER — LAB REQUISITION (OUTPATIENT)
Dept: LAB | Facility: CLINIC | Age: 62
End: 2022-01-14

## 2022-01-14 ENCOUNTER — DISCHARGE SUMMARY NURSING HOME (OUTPATIENT)
Dept: GERIATRICS | Facility: CLINIC | Age: 62
End: 2022-01-14
Payer: MEDICARE

## 2022-01-14 DIAGNOSIS — M05.741 RHEUMATOID ARTHRITIS INVOLVING BOTH HANDS WITH POSITIVE RHEUMATOID FACTOR (H): ICD-10-CM

## 2022-01-14 DIAGNOSIS — F32.1 CURRENT MODERATE EPISODE OF MAJOR DEPRESSIVE DISORDER, UNSPECIFIED WHETHER RECURRENT (H): ICD-10-CM

## 2022-01-14 DIAGNOSIS — E11.49 OTHER DIABETIC NEUROLOGICAL COMPLICATION ASSOCIATED WITH TYPE 2 DIABETES MELLITUS (H): ICD-10-CM

## 2022-01-14 DIAGNOSIS — K21.9 CHRONIC GERD: Primary | ICD-10-CM

## 2022-01-14 DIAGNOSIS — M05.742 RHEUMATOID ARTHRITIS INVOLVING BOTH HANDS WITH POSITIVE RHEUMATOID FACTOR (H): ICD-10-CM

## 2022-01-14 DIAGNOSIS — E11.22 TYPE 2 DIABETES MELLITUS WITH STAGE 3A CHRONIC KIDNEY DISEASE, WITH LONG-TERM CURRENT USE OF INSULIN (H): ICD-10-CM

## 2022-01-14 DIAGNOSIS — I48.91 ATRIAL FIBRILLATION WITH RVR (H): ICD-10-CM

## 2022-01-14 DIAGNOSIS — Z86.73 HISTORY OF CVA (CEREBROVASCULAR ACCIDENT): ICD-10-CM

## 2022-01-14 DIAGNOSIS — Z79.4 TYPE 2 DIABETES MELLITUS WITH STAGE 3A CHRONIC KIDNEY DISEASE, WITH LONG-TERM CURRENT USE OF INSULIN (H): ICD-10-CM

## 2022-01-14 DIAGNOSIS — I50.812 CHRONIC RIGHT-SIDED CONGESTIVE HEART FAILURE (H): ICD-10-CM

## 2022-01-14 DIAGNOSIS — Z00.01 ENCOUNTER FOR GENERAL ADULT MEDICAL EXAMINATION WITH ABNORMAL FINDINGS: ICD-10-CM

## 2022-01-14 DIAGNOSIS — I10 ESSENTIAL HYPERTENSION: ICD-10-CM

## 2022-01-14 DIAGNOSIS — N18.31 TYPE 2 DIABETES MELLITUS WITH STAGE 3A CHRONIC KIDNEY DISEASE, WITH LONG-TERM CURRENT USE OF INSULIN (H): ICD-10-CM

## 2022-01-14 DIAGNOSIS — I48.0 PAROXYSMAL ATRIAL FIBRILLATION (H): ICD-10-CM

## 2022-01-14 DIAGNOSIS — N18.31 CHRONIC KIDNEY DISEASE, STAGE 3A (H): ICD-10-CM

## 2022-01-14 LAB
ANION GAP SERPL CALCULATED.3IONS-SCNC: 4 MMOL/L (ref 3–14)
BUN SERPL-MCNC: 21 MG/DL (ref 7–30)
CALCIUM SERPL-MCNC: 8.9 MG/DL (ref 8.5–10.1)
CHLORIDE BLD-SCNC: 107 MMOL/L (ref 94–109)
CO2 SERPL-SCNC: 27 MMOL/L (ref 20–32)
CREAT SERPL-MCNC: 1.18 MG/DL (ref 0.52–1.04)
GFR SERPL CREATININE-BSD FRML MDRD: 52 ML/MIN/1.73M2
GLUCOSE BLD-MCNC: 230 MG/DL (ref 70–99)
POTASSIUM BLD-SCNC: 4.5 MMOL/L (ref 3.4–5.3)
SARS-COV-2 RNA RESP QL NAA+PROBE: NEGATIVE
SODIUM SERPL-SCNC: 138 MMOL/L (ref 133–144)

## 2022-01-14 PROCEDURE — U0003 INFECTIOUS AGENT DETECTION BY NUCLEIC ACID (DNA OR RNA); SEVERE ACUTE RESPIRATORY SYNDROME CORONAVIRUS 2 (SARS-COV-2) (CORONAVIRUS DISEASE [COVID-19]), AMPLIFIED PROBE TECHNIQUE, MAKING USE OF HIGH THROUGHPUT TECHNOLOGIES AS DESCRIBED BY CMS-2020-01-R: HCPCS | Performed by: NURSE PRACTITIONER

## 2022-01-14 PROCEDURE — 99316 NF DSCHRG MGMT 30 MIN+: CPT | Performed by: NURSE PRACTITIONER

## 2022-01-14 PROCEDURE — 80048 BASIC METABOLIC PNL TOTAL CA: CPT | Performed by: NURSE PRACTITIONER

## 2022-01-14 PROCEDURE — 36415 COLL VENOUS BLD VENIPUNCTURE: CPT | Performed by: NURSE PRACTITIONER

## 2022-01-14 RX ORDER — CITALOPRAM HYDROBROMIDE 10 MG/1
10 TABLET ORAL DAILY
Qty: 30 TABLET | Refills: 0 | Status: SHIPPED | OUTPATIENT
Start: 2022-01-14

## 2022-01-14 RX ORDER — SIMVASTATIN 20 MG
20 TABLET ORAL AT BEDTIME
Qty: 30 TABLET | Refills: 0 | Status: SHIPPED | OUTPATIENT
Start: 2022-01-14 | End: 2022-01-21

## 2022-01-14 RX ORDER — GABAPENTIN 600 MG/1
600 TABLET ORAL 3 TIMES DAILY
Qty: 60 TABLET | Refills: 0 | Status: SHIPPED | OUTPATIENT
Start: 2022-01-14

## 2022-01-14 RX ORDER — METOPROLOL SUCCINATE 25 MG/1
25 TABLET, EXTENDED RELEASE ORAL DAILY
Qty: 30 TABLET | Refills: 0 | Status: SHIPPED | OUTPATIENT
Start: 2022-01-14

## 2022-01-14 RX ORDER — LISINOPRIL 5 MG/1
5 TABLET ORAL DAILY
Qty: 30 TABLET | Refills: 0 | Status: SHIPPED | OUTPATIENT
Start: 2022-01-14

## 2022-01-14 NOTE — PROGRESS NOTES
[facility] GERIATRIC SERVICES DISCHARGE SUMMARY  PATIENT'S NAME: Connie Longoria  YOB: 1960  MEDICAL RECORD NUMBER:  [id number]  Place of Service where encounter took place:  [facility] (TCU) [[id number]]    PRIMARY CARE PROVIDER AND CLINIC RESPONSIBLE AFTER TRANSFER:   [facility] [address] / [place]    Non-[facility] Provider     Transferring providers: Tavares Box NP, Dr. Marjan MD  Recent Hospitalization/ED:  [facility] stay 1/3/22 to 1/6/22.  Date of SNF Admission: 1/6/22  Date of SNF (anticipated) Discharge: January 14, 2022  Discharged to: previous independent home  Cognitive Scores: SLUMS: 22/30  Physical Function: Ambulating 350 ft with FWW  DME: NA    CODE STATUS/ADVANCE DIRECTIVES DISCUSSION:  Prior   ALLERGIES: Ascorbate, Atorvastatin, Effexor [venlafaxine], Other  [no clinical screening - see comments], Rosuvastatin, and Wellbutrin [bupropion]    This is a 61-year-old female who presented with generalized weakness, no TIAs, severe nausea and presyncope.  She was found to be in A. fib with RVR and dehydrated.  She was started on metoprolol and converted to normal sinus rhythm.  She was started on Eliquis at discharge.  Apparently not appropriate for ECV per prolonged time in A. Fib, will follow up with cardiology/EPS outpatient.  She has suspected UTI though minimal Enterococcus grew so antibiotics discontinued.  She developed ARF, improved with NSR, restarted on lisinopril due to h/o DM/HTN/CAD/ HFrEF.  Also had hyponatremia per dehydration, corrected with IVF.  Per diabetes her PTA Lantus was decreased per hypotension.  No other changes noticed was discharged to [facility] for rehab.    Summary of nursing facility stay:     (I48.91) Atrial fibrillation with RVR (H)  (I15.2) Hypertension due to endocrine disorder  Converted to normal sinus rhythm with metoprolol succinate 25 mg daily and today per lower evening Bps  decreased lisinopril to 5mg daily, follow-up with cardiology/EP per ECV. CTM     (I63.9) Cerebrovascular accident (CVA), unspecified mechanism (H)  (primary encounter diagnosis)  History of A. fib  Continue statin and low-dose aspirin.  Recently started on apixaban 5 mg twice daily     (E11.22,  N18.31,  Z79.4) Type 2 diabetes mellitus with stage 3a chronic kidney disease, with long-term current use of insulin (H)  Last A1c 6.4 on 1/3/2022, BS in 80-90 in AM will decrease Lantus to 20 units at bedtime     (K21.9) Chronic GERD  Probable gastroparesis  Discontinued Reglan. Continue famotidine 20 mg twice daily and Tums 500 mg every 4 hours as needed.  Plan to decrease H2B per renal fx if tx effective     (B18.2) Chronic hepatitis C without hepatic coma (H)  Not on current treatment     (N18.31) Chronic kidney disease, stage 3a (H)  Last Cr 1.50 with GFR 39 on 1/10, encourage fluids     (E87.6) Hypokalemia  Last K 4.6 on 1/10     (D64.9) Normocytic anemia  Last Hgb 10.4 on 1/10     Thrombocytopenia  Last platelet 140 on 1/10     Depression  Continue citalopram 10 mg daily     Glaucoma  Continue Lumigan and Combivent and Trusopt drops    Discharge Medications:    Current Outpatient Medications   Medication Sig Dispense Refill     acetaminophen (TYLENOL) 500 MG tablet Take 1,000 mg by mouth 3 times daily       apixaban ANTICOAGULANT (ELIQUIS) 5 MG tablet Take 1 tablet (5 mg) by mouth 2 times daily 60 tablet 0     aspirin 81 MG tablet Take 81 mg by mouth daily       bimatoprost (LUMIGAN) 0.01 % SOLN Place 1 drop into the right eye At Bedtime       brimonidine-timolol (COMBIGAN) 0.2-0.5 % ophthalmic solution Place 1 drop into the right eye 2 times daily       calcium carbonate (TUMS) 500 MG chewable tablet Take 1 chew tab by mouth every 4 hours as needed for heartburn       citalopram (CELEXA) 10 MG tablet Take 1 tablet (10 mg) by mouth daily 30 tablet 0     dorzolamide (TRUSOPT) 2 % ophthalmic solution Place 1 drop into  the right eye 2 times daily       famotidine (PEPCID) 20 MG tablet Take 20 mg by mouth 2 times daily       gabapentin (NEURONTIN) 600 MG tablet Take 600 mg by mouth 3 times daily       insulin glargine (LANTUS PEN) 100 UNIT/ML pen Inject 25 Units Subcutaneous At Bedtime (Patient taking differently: Inject 20 Units Subcutaneous At Bedtime ) 15 mL      LISINOPRIL PO Take 5 mg by mouth daily        metoprolol succinate ER (TOPROL-XL) 25 MG 24 hr tablet Take 25 mg by mouth daily       simvastatin (ZOCOR) 20 MG tablet Take 20 mg by mouth At Bedtime       VITAMIN D PO Take 1 tablet by mouth daily OTC: Patient unsure of strength         Controlled medications:   not applicable/none     Past Medical History:   Past Medical History:   Diagnosis Date     LAURI (acute kidney injury) (H) 12/16/2015     Atrial fibrillation with RVR (H) 1/3/2022     Congestive heart failure, unspecified      Coronary artery disease      CVA (cerebral vascular accident) (H)      Diabetes (H)      Elevated troponin 12/16/2015     Gastro-oesophageal reflux disease      Glaucoma      Heart attack (H)      Hyperlipidemia      Hypertension      Renal disease     from diabetes and steroid use     Rheumatoid arthritis (H)      Stented coronary artery      Steroid long-term use      Stroke (cerebrum) (H)      Type 2 diabetes mellitus (H) 12/21/2007    Overview:  Severe.  Avastin injection followed by focal photocoagulation     Unspecified cerebral artery occlusion with cerebral infarction      Vision loss of left eye 3/15/2017     Physical Exam:   Vitals: BP (!) 142/82   Pulse 50   Temp 97.2  F (36.2  C)   Resp 16   Wt 85.5 kg (188 lb 8 oz)   SpO2 100%   BMI 32.36 kg/m    BMI= Body mass index is 32.36 kg/m .  GENERAL APPEARANCE:  Alert, oriented, morbidly obese, cooperative, L shoulder pain improved  RESP:  respiratory effort and palpation of chest normal, lungs clear to auscultation , no respiratory distress  CV:  Palpation and auscultation of heart  done , regular rate and rhythm, no murmur, rub, or gallop, no edema  ABDOMEN:  normal bowel sounds, soft, nontender, no hepatosplenomegaly or other masses, GERD improved  PSYCH:  oriented X 3, memory impaired. SLUMS 22/30       SNF labs:   Most Recent 3 CBC's:Recent Labs   Lab Test 01/06/22  0534 01/04/22  0700 01/03/22 2038   WBC 8.1 9.9 11.1*   HGB 12.2 14.1 17.2*   MCV 93 93 93   * 169 205     Most Recent 3 BMP's:Recent Labs   Lab Test 01/06/22  0730 01/05/22  2107 01/05/22  1655 01/05/22  0757 01/05/22  0540 01/04/22  0735 01/04/22  0700 01/03/22  2304 01/03/22 2038   NA  --   --   --   --  135  --  134  --  126*   POTASSIUM  --   --   --   --  3.2*  --  3.4  --  4.0   CHLORIDE  --   --   --   --  103  --  99  --  87*   CO2  --   --   --   --  27  --  27  --  25   BUN  --   --   --   --  42*  --  61*  --  77*   CR  --   --   --   --  1.26*  --  1.56*  --  1.95*   ANIONGAP  --   --   --   --  5  --  8  --  14   THOMAS  --   --   --   --  9.0  --  8.5  --  10.6*   GLC 64* 239* 243*   < > 137*   < > 228*   < > 384*    < > = values in this interval not displayed.       DISCHARGE PLAN:    Follow up labs: No labs orders/due    Medical Follow Up:      Follow up with primary care provider in 1-2 weeks    Current [facility] scheduled appointments:       Discharge Services: Home Care:  Occupational Therapy, Physical Therapy, Registered Nurse and Home Health Aide    Discharge Instructions Verbalized to Patient at Discharge:     None    TOTAL DISCHARGE TIME:   Greater than 30 minutes  Electronically signed by:  Tavares Box NP     Home care Face to Face documentation done in [employer] attached to Home care orders for [facility].

## 2022-01-17 ENCOUNTER — APPOINTMENT (OUTPATIENT)
Dept: GENERAL RADIOLOGY | Facility: CLINIC | Age: 62
End: 2022-01-17
Attending: EMERGENCY MEDICINE
Payer: MEDICARE

## 2022-01-17 ENCOUNTER — APPOINTMENT (OUTPATIENT)
Dept: ULTRASOUND IMAGING | Facility: CLINIC | Age: 62
End: 2022-01-17
Attending: EMERGENCY MEDICINE
Payer: MEDICARE

## 2022-01-17 ENCOUNTER — HOSPITAL ENCOUNTER (OUTPATIENT)
Facility: CLINIC | Age: 62
Setting detail: OBSERVATION
Discharge: HOME | End: 2022-01-19
Attending: EMERGENCY MEDICINE | Admitting: INTERNAL MEDICINE
Payer: MEDICARE

## 2022-01-17 DIAGNOSIS — R10.13 ABDOMINAL PAIN, EPIGASTRIC: ICD-10-CM

## 2022-01-17 DIAGNOSIS — K31.84 GASTROPARESIS: ICD-10-CM

## 2022-01-17 DIAGNOSIS — R79.89 ELEVATED TROPONIN: ICD-10-CM

## 2022-01-17 DIAGNOSIS — R11.2 NON-INTRACTABLE VOMITING WITH NAUSEA, UNSPECIFIED VOMITING TYPE: Primary | ICD-10-CM

## 2022-01-17 LAB
ALBUMIN SERPL-MCNC: 3.8 G/DL (ref 3.4–5)
ALP SERPL-CCNC: 85 U/L (ref 40–150)
ALT SERPL W P-5'-P-CCNC: 23 U/L (ref 0–50)
ANION GAP SERPL CALCULATED.3IONS-SCNC: 11 MMOL/L (ref 3–14)
AST SERPL W P-5'-P-CCNC: 24 U/L (ref 0–45)
ATRIAL RATE - MUSE: 102 BPM
BILIRUB SERPL-MCNC: 0.6 MG/DL (ref 0.2–1.3)
BUN SERPL-MCNC: 29 MG/DL (ref 7–30)
CALCIUM SERPL-MCNC: 10.3 MG/DL (ref 8.5–10.1)
CHLORIDE BLD-SCNC: 104 MMOL/L (ref 94–109)
CO2 SERPL-SCNC: 20 MMOL/L (ref 20–32)
CREAT SERPL-MCNC: 1.27 MG/DL (ref 0.52–1.04)
DIASTOLIC BLOOD PRESSURE - MUSE: NORMAL MMHG
ERYTHROCYTE [DISTWIDTH] IN BLOOD BY AUTOMATED COUNT: 12.8 % (ref 10–15)
GFR SERPL CREATININE-BSD FRML MDRD: 48 ML/MIN/1.73M2
GLUCOSE BLD-MCNC: 246 MG/DL (ref 70–99)
HCT VFR BLD AUTO: 45.1 % (ref 35–47)
HGB BLD-MCNC: 15.6 G/DL (ref 11.7–15.7)
INTERPRETATION ECG - MUSE: NORMAL
LIPASE SERPL-CCNC: 44 U/L (ref 73–393)
MCH RBC QN AUTO: 31.7 PG (ref 26.5–33)
MCHC RBC AUTO-ENTMCNC: 34.6 G/DL (ref 31.5–36.5)
MCV RBC AUTO: 92 FL (ref 78–100)
P AXIS - MUSE: 80 DEGREES
PLATELET # BLD AUTO: 223 10E3/UL (ref 150–450)
POTASSIUM BLD-SCNC: 3.7 MMOL/L (ref 3.4–5.3)
PR INTERVAL - MUSE: 130 MS
PROT SERPL-MCNC: 8.8 G/DL (ref 6.8–8.8)
QRS DURATION - MUSE: 102 MS
QT - MUSE: 376 MS
QTC - MUSE: 490 MS
R AXIS - MUSE: 69 DEGREES
RBC # BLD AUTO: 4.92 10E6/UL (ref 3.8–5.2)
SARS-COV-2 RNA RESP QL NAA+PROBE: NEGATIVE
SODIUM SERPL-SCNC: 135 MMOL/L (ref 133–144)
SYSTOLIC BLOOD PRESSURE - MUSE: NORMAL MMHG
T AXIS - MUSE: 100 DEGREES
TROPONIN I SERPL HS-MCNC: 66 NG/L
TROPONIN I SERPL HS-MCNC: 70 NG/L
VENTRICULAR RATE- MUSE: 102 BPM
WBC # BLD AUTO: 11 10E3/UL (ref 4–11)

## 2022-01-17 PROCEDURE — 76705 ECHO EXAM OF ABDOMEN: CPT

## 2022-01-17 PROCEDURE — 87635 SARS-COV-2 COVID-19 AMP PRB: CPT | Performed by: EMERGENCY MEDICINE

## 2022-01-17 PROCEDURE — 96361 HYDRATE IV INFUSION ADD-ON: CPT

## 2022-01-17 PROCEDURE — 96376 TX/PRO/DX INJ SAME DRUG ADON: CPT

## 2022-01-17 PROCEDURE — 99285 EMERGENCY DEPT VISIT HI MDM: CPT | Mod: 25

## 2022-01-17 PROCEDURE — G0378 HOSPITAL OBSERVATION PER HR: HCPCS

## 2022-01-17 PROCEDURE — 71046 X-RAY EXAM CHEST 2 VIEWS: CPT

## 2022-01-17 PROCEDURE — 93005 ELECTROCARDIOGRAM TRACING: CPT

## 2022-01-17 PROCEDURE — C9803 HOPD COVID-19 SPEC COLLECT: HCPCS

## 2022-01-17 PROCEDURE — 85027 COMPLETE CBC AUTOMATED: CPT | Performed by: EMERGENCY MEDICINE

## 2022-01-17 PROCEDURE — 258N000003 HC RX IP 258 OP 636: Performed by: EMERGENCY MEDICINE

## 2022-01-17 PROCEDURE — 83690 ASSAY OF LIPASE: CPT | Performed by: EMERGENCY MEDICINE

## 2022-01-17 PROCEDURE — 36415 COLL VENOUS BLD VENIPUNCTURE: CPT | Performed by: EMERGENCY MEDICINE

## 2022-01-17 PROCEDURE — 250N000009 HC RX 250: Performed by: EMERGENCY MEDICINE

## 2022-01-17 PROCEDURE — 96374 THER/PROPH/DIAG INJ IV PUSH: CPT

## 2022-01-17 PROCEDURE — 96375 TX/PRO/DX INJ NEW DRUG ADDON: CPT

## 2022-01-17 PROCEDURE — 99220 PR INITIAL OBSERVATION CARE,LEVEL III: CPT | Performed by: INTERNAL MEDICINE

## 2022-01-17 PROCEDURE — 84484 ASSAY OF TROPONIN QUANT: CPT | Performed by: EMERGENCY MEDICINE

## 2022-01-17 PROCEDURE — 80053 COMPREHEN METABOLIC PANEL: CPT | Performed by: EMERGENCY MEDICINE

## 2022-01-17 PROCEDURE — 250N000011 HC RX IP 250 OP 636: Performed by: EMERGENCY MEDICINE

## 2022-01-17 RX ORDER — ONDANSETRON 2 MG/ML
4 INJECTION INTRAMUSCULAR; INTRAVENOUS ONCE
Status: COMPLETED | OUTPATIENT
Start: 2022-01-17 | End: 2022-01-17

## 2022-01-17 RX ORDER — ONDANSETRON 4 MG/1
4 TABLET, ORALLY DISINTEGRATING ORAL EVERY 6 HOURS PRN
Status: DISCONTINUED | OUTPATIENT
Start: 2022-01-17 | End: 2022-01-18

## 2022-01-17 RX ORDER — ONDANSETRON 2 MG/ML
4 INJECTION INTRAMUSCULAR; INTRAVENOUS EVERY 6 HOURS PRN
Status: DISCONTINUED | OUTPATIENT
Start: 2022-01-17 | End: 2022-01-18

## 2022-01-17 RX ORDER — SUCRALFATE 1 G/1
1 TABLET ORAL 4 TIMES DAILY PRN
Qty: 60 TABLET | Refills: 0 | Status: SHIPPED | OUTPATIENT
Start: 2022-01-17 | End: 2022-01-17

## 2022-01-17 RX ORDER — ONDANSETRON 4 MG/1
4 TABLET, FILM COATED ORAL EVERY 8 HOURS PRN
Qty: 10 TABLET | Refills: 0 | Status: SHIPPED | OUTPATIENT
Start: 2022-01-17 | End: 2022-01-17

## 2022-01-17 RX ADMIN — ONDANSETRON 4 MG: 2 INJECTION INTRAMUSCULAR; INTRAVENOUS at 21:11

## 2022-01-17 RX ADMIN — SODIUM CHLORIDE 1000 ML: 9 INJECTION, SOLUTION INTRAVENOUS at 16:30

## 2022-01-17 RX ADMIN — ONDANSETRON 4 MG: 2 INJECTION INTRAMUSCULAR; INTRAVENOUS at 19:03

## 2022-01-17 RX ADMIN — FAMOTIDINE 20 MG: 10 INJECTION, SOLUTION INTRAVENOUS at 21:15

## 2022-01-17 ASSESSMENT — ENCOUNTER SYMPTOMS
FEVER: 0
NAUSEA: 1
BACK PAIN: 0
DIARRHEA: 0
FLANK PAIN: 0
VOMITING: 1
SHORTNESS OF BREATH: 0
ABDOMINAL PAIN: 1

## 2022-01-17 ASSESSMENT — MIFFLIN-ST. JEOR: SCORE: 1406.83

## 2022-01-18 LAB
ANION GAP SERPL CALCULATED.3IONS-SCNC: 6 MMOL/L (ref 3–14)
BUN SERPL-MCNC: 36 MG/DL (ref 7–30)
CALCIUM SERPL-MCNC: 8.9 MG/DL (ref 8.5–10.1)
CHLORIDE BLD-SCNC: 108 MMOL/L (ref 94–109)
CO2 SERPL-SCNC: 25 MMOL/L (ref 20–32)
CREAT SERPL-MCNC: 1.36 MG/DL (ref 0.52–1.04)
GFR SERPL CREATININE-BSD FRML MDRD: 44 ML/MIN/1.73M2
GLUCOSE BLD-MCNC: 169 MG/DL (ref 70–99)
GLUCOSE BLDC GLUCOMTR-MCNC: 114 MG/DL (ref 70–99)
GLUCOSE BLDC GLUCOMTR-MCNC: 120 MG/DL (ref 70–99)
GLUCOSE BLDC GLUCOMTR-MCNC: 133 MG/DL (ref 70–99)
GLUCOSE BLDC GLUCOMTR-MCNC: 137 MG/DL (ref 70–99)
GLUCOSE BLDC GLUCOMTR-MCNC: 195 MG/DL (ref 70–99)
POTASSIUM BLD-SCNC: 3.5 MMOL/L (ref 3.4–5.3)
SODIUM SERPL-SCNC: 139 MMOL/L (ref 133–144)
TROPONIN I SERPL HS-MCNC: 67 NG/L
TROPONIN I SERPL HS-MCNC: 70 NG/L

## 2022-01-18 PROCEDURE — 96372 THER/PROPH/DIAG INJ SC/IM: CPT | Performed by: INTERNAL MEDICINE

## 2022-01-18 PROCEDURE — 96372 THER/PROPH/DIAG INJ SC/IM: CPT

## 2022-01-18 PROCEDURE — 36415 COLL VENOUS BLD VENIPUNCTURE: CPT | Performed by: INTERNAL MEDICINE

## 2022-01-18 PROCEDURE — 99225 PR SUBSEQUENT OBSERVATION CARE,LEVEL II: CPT | Performed by: HOSPITALIST

## 2022-01-18 PROCEDURE — 250N000013 HC RX MED GY IP 250 OP 250 PS 637: Performed by: INTERNAL MEDICINE

## 2022-01-18 PROCEDURE — 250N000011 HC RX IP 250 OP 636: Performed by: INTERNAL MEDICINE

## 2022-01-18 PROCEDURE — 258N000003 HC RX IP 258 OP 636: Performed by: INTERNAL MEDICINE

## 2022-01-18 PROCEDURE — 99207 PR CDG-CODE CATEGORY CHANGED: CPT | Performed by: HOSPITALIST

## 2022-01-18 PROCEDURE — G0378 HOSPITAL OBSERVATION PER HR: HCPCS

## 2022-01-18 PROCEDURE — 96361 HYDRATE IV INFUSION ADD-ON: CPT

## 2022-01-18 PROCEDURE — 82962 GLUCOSE BLOOD TEST: CPT | Mod: 91

## 2022-01-18 PROCEDURE — 250N000012 HC RX MED GY IP 250 OP 636 PS 637: Performed by: INTERNAL MEDICINE

## 2022-01-18 PROCEDURE — C9113 INJ PANTOPRAZOLE SODIUM, VIA: HCPCS | Performed by: INTERNAL MEDICINE

## 2022-01-18 PROCEDURE — 84484 ASSAY OF TROPONIN QUANT: CPT | Performed by: INTERNAL MEDICINE

## 2022-01-18 PROCEDURE — 80048 BASIC METABOLIC PNL TOTAL CA: CPT | Performed by: INTERNAL MEDICINE

## 2022-01-18 PROCEDURE — 96375 TX/PRO/DX INJ NEW DRUG ADDON: CPT

## 2022-01-18 RX ORDER — NALOXONE HYDROCHLORIDE 0.4 MG/ML
0.4 INJECTION, SOLUTION INTRAMUSCULAR; INTRAVENOUS; SUBCUTANEOUS
Status: DISCONTINUED | OUTPATIENT
Start: 2022-01-18 | End: 2022-01-19 | Stop reason: HOSPADM

## 2022-01-18 RX ORDER — PROCHLORPERAZINE 25 MG
25 SUPPOSITORY, RECTAL RECTAL EVERY 12 HOURS PRN
Status: DISCONTINUED | OUTPATIENT
Start: 2022-01-18 | End: 2022-01-19 | Stop reason: HOSPADM

## 2022-01-18 RX ORDER — HYDROMORPHONE HCL IN WATER/PF 6 MG/30 ML
0.2 PATIENT CONTROLLED ANALGESIA SYRINGE INTRAVENOUS
Status: DISCONTINUED | OUTPATIENT
Start: 2022-01-18 | End: 2022-01-19 | Stop reason: HOSPADM

## 2022-01-18 RX ORDER — ASPIRIN 81 MG/1
81 TABLET ORAL DAILY
Status: DISCONTINUED | OUTPATIENT
Start: 2022-01-18 | End: 2022-01-19 | Stop reason: HOSPADM

## 2022-01-18 RX ORDER — ONDANSETRON 4 MG/1
4 TABLET, ORALLY DISINTEGRATING ORAL EVERY 6 HOURS PRN
Status: DISCONTINUED | OUTPATIENT
Start: 2022-01-18 | End: 2022-01-19 | Stop reason: HOSPADM

## 2022-01-18 RX ORDER — NALOXONE HYDROCHLORIDE 0.4 MG/ML
0.2 INJECTION, SOLUTION INTRAMUSCULAR; INTRAVENOUS; SUBCUTANEOUS
Status: DISCONTINUED | OUTPATIENT
Start: 2022-01-18 | End: 2022-01-19 | Stop reason: HOSPADM

## 2022-01-18 RX ORDER — PROCHLORPERAZINE MALEATE 10 MG
10 TABLET ORAL EVERY 6 HOURS PRN
Status: DISCONTINUED | OUTPATIENT
Start: 2022-01-18 | End: 2022-01-19 | Stop reason: HOSPADM

## 2022-01-18 RX ORDER — BRIMONIDINE TARTRATE AND TIMOLOL MALEATE 2; 5 MG/ML; MG/ML
1 SOLUTION OPHTHALMIC 2 TIMES DAILY
Status: DISCONTINUED | OUTPATIENT
Start: 2022-01-18 | End: 2022-01-19 | Stop reason: HOSPADM

## 2022-01-18 RX ORDER — CITALOPRAM HYDROBROMIDE 10 MG/1
10 TABLET ORAL DAILY
Status: DISCONTINUED | OUTPATIENT
Start: 2022-01-18 | End: 2022-01-19 | Stop reason: HOSPADM

## 2022-01-18 RX ORDER — SODIUM CHLORIDE 9 MG/ML
INJECTION, SOLUTION INTRAVENOUS CONTINUOUS
Status: DISCONTINUED | OUTPATIENT
Start: 2022-01-18 | End: 2022-01-18 | Stop reason: CLARIF

## 2022-01-18 RX ORDER — NICOTINE POLACRILEX 4 MG
15-30 LOZENGE BUCCAL
Status: DISCONTINUED | OUTPATIENT
Start: 2022-01-18 | End: 2022-01-19 | Stop reason: HOSPADM

## 2022-01-18 RX ORDER — AMOXICILLIN 250 MG
2 CAPSULE ORAL 2 TIMES DAILY PRN
Status: DISCONTINUED | OUTPATIENT
Start: 2022-01-18 | End: 2022-01-19 | Stop reason: HOSPADM

## 2022-01-18 RX ORDER — GABAPENTIN 600 MG/1
600 TABLET ORAL 3 TIMES DAILY
Status: DISCONTINUED | OUTPATIENT
Start: 2022-01-18 | End: 2022-01-19 | Stop reason: HOSPADM

## 2022-01-18 RX ORDER — ONDANSETRON 2 MG/ML
4 INJECTION INTRAMUSCULAR; INTRAVENOUS EVERY 6 HOURS PRN
Status: DISCONTINUED | OUTPATIENT
Start: 2022-01-18 | End: 2022-01-19 | Stop reason: HOSPADM

## 2022-01-18 RX ORDER — ACETAMINOPHEN 500 MG
1000 TABLET ORAL 3 TIMES DAILY
Status: DISCONTINUED | OUTPATIENT
Start: 2022-01-18 | End: 2022-01-19 | Stop reason: HOSPADM

## 2022-01-18 RX ORDER — DEXTROSE MONOHYDRATE 25 G/50ML
25-50 INJECTION, SOLUTION INTRAVENOUS
Status: DISCONTINUED | OUTPATIENT
Start: 2022-01-18 | End: 2022-01-19 | Stop reason: HOSPADM

## 2022-01-18 RX ORDER — METOPROLOL SUCCINATE 25 MG/1
25 TABLET, EXTENDED RELEASE ORAL DAILY
Status: DISCONTINUED | OUTPATIENT
Start: 2022-01-18 | End: 2022-01-19 | Stop reason: HOSPADM

## 2022-01-18 RX ORDER — HYDRALAZINE HYDROCHLORIDE 20 MG/ML
10 INJECTION INTRAMUSCULAR; INTRAVENOUS EVERY 4 HOURS PRN
Status: DISCONTINUED | OUTPATIENT
Start: 2022-01-18 | End: 2022-01-19 | Stop reason: HOSPADM

## 2022-01-18 RX ORDER — AMOXICILLIN 250 MG
1 CAPSULE ORAL 2 TIMES DAILY PRN
Status: DISCONTINUED | OUTPATIENT
Start: 2022-01-18 | End: 2022-01-19 | Stop reason: HOSPADM

## 2022-01-18 RX ORDER — CALCIUM CARBONATE 500 MG/1
500 TABLET, CHEWABLE ORAL EVERY 4 HOURS PRN
Status: DISCONTINUED | OUTPATIENT
Start: 2022-01-18 | End: 2022-01-19 | Stop reason: HOSPADM

## 2022-01-18 RX ORDER — LABETALOL HYDROCHLORIDE 5 MG/ML
10 INJECTION, SOLUTION INTRAVENOUS
Status: DISCONTINUED | OUTPATIENT
Start: 2022-01-18 | End: 2022-01-18

## 2022-01-18 RX ORDER — DORZOLAMIDE HCL 20 MG/ML
1 SOLUTION/ DROPS OPHTHALMIC 2 TIMES DAILY
Status: DISCONTINUED | OUTPATIENT
Start: 2022-01-18 | End: 2022-01-19 | Stop reason: HOSPADM

## 2022-01-18 RX ORDER — VITAMIN B COMPLEX
25 TABLET ORAL DAILY
Status: DISCONTINUED | OUTPATIENT
Start: 2022-01-18 | End: 2022-01-19 | Stop reason: HOSPADM

## 2022-01-18 RX ADMIN — ASPIRIN 81 MG: 81 TABLET, COATED ORAL at 09:08

## 2022-01-18 RX ADMIN — GABAPENTIN 600 MG: 600 TABLET, FILM COATED ORAL at 09:08

## 2022-01-18 RX ADMIN — ACETAMINOPHEN 1000 MG: 500 TABLET ORAL at 09:08

## 2022-01-18 RX ADMIN — INSULIN GLARGINE 10 UNITS: 100 INJECTION, SOLUTION SUBCUTANEOUS at 01:49

## 2022-01-18 RX ADMIN — METOPROLOL SUCCINATE 25 MG: 25 TABLET, EXTENDED RELEASE ORAL at 09:08

## 2022-01-18 RX ADMIN — CITALOPRAM HYDROBROMIDE 10 MG: 10 TABLET ORAL at 09:08

## 2022-01-18 RX ADMIN — SODIUM CHLORIDE: 9 INJECTION, SOLUTION INTRAVENOUS at 09:14

## 2022-01-18 RX ADMIN — PANTOPRAZOLE SODIUM 40 MG: 40 INJECTION, POWDER, FOR SOLUTION INTRAVENOUS at 09:07

## 2022-01-18 RX ADMIN — GABAPENTIN 600 MG: 600 TABLET, FILM COATED ORAL at 21:18

## 2022-01-18 RX ADMIN — Medication 25 MCG: at 09:08

## 2022-01-18 RX ADMIN — INSULIN GLARGINE 10 UNITS: 100 INJECTION, SOLUTION SUBCUTANEOUS at 21:22

## 2022-01-18 RX ADMIN — ACETAMINOPHEN 1000 MG: 500 TABLET ORAL at 21:17

## 2022-01-18 RX ADMIN — APIXABAN 5 MG: 5 TABLET, FILM COATED ORAL at 21:17

## 2022-01-18 RX ADMIN — APIXABAN 5 MG: 5 TABLET, FILM COATED ORAL at 09:08

## 2022-01-18 RX ADMIN — ACETAMINOPHEN 1000 MG: 500 TABLET ORAL at 14:31

## 2022-01-18 RX ADMIN — PROCHLORPERAZINE EDISYLATE 10 MG: 5 INJECTION INTRAMUSCULAR; INTRAVENOUS at 01:43

## 2022-01-18 RX ADMIN — GABAPENTIN 600 MG: 600 TABLET, FILM COATED ORAL at 14:31

## 2022-01-18 RX ADMIN — SODIUM CHLORIDE: 9 INJECTION, SOLUTION INTRAVENOUS at 01:20

## 2022-01-18 ASSESSMENT — MIFFLIN-ST. JEOR: SCORE: 1367.94

## 2022-01-18 NOTE — ED PROVIDER NOTES
History   Chief Complaint:  Vomiting     HPI   Connie Longoria is a 61 year old female who presents with abdominal pain, nausea, and vomiting.  Patient notes she has been having abdominal pain and vomiting for the last 2 days.  She denies diarrhea, fever, and constitutional symptoms.  She denies urinary complaint such as dysuria, frequency, or blood in urine.  She denies abdominal surgical history and states she still has her appendix and gallbladder.  She denies a history of alcohol use.  She does note she uses marijuana intermittently but last use was 2 weeks ago.  She has not taken any nausea medication prehospital.  She notes the majority of her abdominal pain is in the middle upper area of her abdomen.  She denies chest pain or shortness of breath.    ROS:  Review of Systems   Constitutional: Negative for fever.   Respiratory: Negative for shortness of breath.    Cardiovascular: Negative for chest pain.   Gastrointestinal: Positive for abdominal pain, nausea and vomiting. Negative for diarrhea.   Genitourinary: Negative for flank pain.   Musculoskeletal: Negative for back pain.   All other systems reviewed and are negative.    Allergies:  Ascorbate  Atorvastatin  Effexor [Venlafaxine]  Other  [No Clinical Screening - See Comments]  Rosuvastatin  Wellbutrin [Bupropion]     Medications:    acetaminophen (TYLENOL) 500 MG tablet  apixaban ANTICOAGULANT (ELIQUIS) 5 MG tablet  aspirin 81 MG tablet  bimatoprost (LUMIGAN) 0.01 % SOLN  brimonidine-timolol (COMBIGAN) 0.2-0.5 % ophthalmic solution  calcium carbonate (TUMS) 500 MG chewable tablet  citalopram (CELEXA) 10 MG tablet  dorzolamide (TRUSOPT) 2 % ophthalmic solution  famotidine (PEPCID) 20 MG tablet  gabapentin (NEURONTIN) 600 MG tablet  insulin glargine (LANTUS PEN) 100 UNIT/ML pen  lisinopril (ZESTRIL) 5 MG tablet  metoprolol succinate ER (TOPROL-XL) 25 MG 24 hr tablet  simvastatin (ZOCOR) 20 MG tablet  VITAMIN D PO      Past Medical History:    Past Medical  History:   Diagnosis Date     LAURI (acute kidney injury) (H) 12/16/2015     Atrial fibrillation with RVR (H) 1/3/2022     Congestive heart failure, unspecified      Coronary artery disease      CVA (cerebral vascular accident) (H)      Diabetes (H)      Elevated troponin 12/16/2015     Gastro-oesophageal reflux disease      Glaucoma      Heart attack (H)      Hyperlipidemia      Hypertension      Renal disease      Rheumatoid arthritis (H)      Stented coronary artery      Steroid long-term use      Stroke (cerebrum) (H)      Type 2 diabetes mellitus (H) 12/21/2007     Unspecified cerebral artery occlusion with cerebral infarction      Vision loss of left eye 3/15/2017     Patient Active Problem List   Diagnosis     Fibromyalgia     Pain in joint, multiple sites     NVG (neovascular glaucoma), left, severe stage     Pseudophakia of both eyes     Glaucoma     Neovascular glaucoma of left eye     Bilateral hand pain     Rheumatoid arthritis involving both hands with positive rheumatoid factor (H)     Acute cystitis without hematuria     Adrenal insufficiency (H)     LAURI (acute kidney injury) (H)     Anasarca     Astigmatism of both eyes with presbyopia     Bilateral hip pain     Blurred vision, right eye     Carotid artery stenosis     Cerebrovascular accident (CVA) (H)     Chronic GERD     Cirrhosis of liver (H)     Depression     Diabetic gastroparesis (H)     Diabetic macular edema of both eyes (H)     Elevated troponin     Fecal occult blood test positive     Health care home, active care coordination     Hepatitis C, chronic (H)     Hypokalemia     Hyponatremia     Hypertension     Iridocyclitis of left eye     Long term current use of systemic steroids     Moderate mitral regurgitation by prior echocardiogram     Moderate protein malnutrition (H)     Obesity (BMI 30.0-34.9)     Peripheral neuropathy     Presbyopia     Recurrent major depression (H)     Rheumatoid arthritis, seropositive (H)     S/P CABG x 4      Thrombocytopenia (H)     Tobacco dependence     Trigger finger, acquired     Type 2 diabetes mellitus (H)     Urinary incontinence     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     Vision loss of left eye     Normocytic anemia     CHF (congestive heart failure) (H)     Coronary atherosclerosis     Hyperlipidemia     Myopia     Osteoporosis     Sepsis due to urinary tract infection (H)     Tobacco abuse     Renal insufficiency     Atrial fibrillation with RVR (H)     Chronic kidney disease, stage 3a (H)     Gastroparesis     Abdominal pain, epigastric     Non-intractable vomiting with nausea, unspecified vomiting type      Past Surgical History:    Past Surgical History:   Procedure Laterality Date     AS CABG, ARTERY-VEIN, FOUR       CARDIAC SURGERY       COLONOSCOPY       CYCLOPHOTOCOAGULATION TRANSSCLERAL WITH MICROPULSE LASER Right 4/17/2019    Procedure: Right Eye Micropulse Cyclophotocoagulation;  Surgeon: Bethany William MD;  Location:  OR     EYE SURGERY Left      EYE SURGERY Right 11/08/2017    shunt placement     IMPLANT VALVE EYE Left 9/18/2015    Procedure: IMPLANT VALVE EYE;  Surgeon: Harlan Harris MD;  Location: [facility]     IMPLANT VALVE EYE Left 11/3/2015    Procedure: IMPLANT VALVE EYE;  Surgeon: Harlan Harris MD;  Location: [facility]     REVISE GLAUCOMA SHUNT Left 10/9/2015    Procedure: REVISE GLAUCOMA SHUNT;  Surgeon: Harlan Harris MD;  Location: [facility]     REVISE GLAUCOMA SHUNT Left 2/23/2016    Procedure: REVISE GLAUCOMA SHUNT;  Surgeon: Harlan Harris MD;  Location: [facility]     VASCULAR SURGERY      carodid endarectomy      Family History:    family history includes Diabetes in her brother, mother, and sister; Glaucoma in her mother and sister.    Social History:   reports that she has been smoking cigarettes. She has been smoking about 0.20 packs per day. She has never used smokeless tobacco. She reports that she does not drink alcohol and does not use drugs.  PCP: Jessica Brand  "    Physical Exam     Patient Vitals for the past 24 hrs:   BP Temp Temp src Pulse Resp SpO2 Height Weight   01/17/22 2135 (!) 185/101 -- -- 101 17 100 % -- --   01/17/22 2130 (!) 194/110 -- -- 99 27 100 % -- --   01/17/22 2055 (!) 171/114 -- -- 107 16 100 % -- --   01/17/22 2048 (!) 196/125 -- -- 107 -- 100 % -- --   01/17/22 1915 (!) 156/88 -- -- -- 18 99 % -- --   01/17/22 1910 -- 98.2  F (36.8  C) Oral -- -- -- 1.626 m (5' 4\") 86.2 kg (190 lb)   01/17/22 1622 114/65 -- -- 98 18 99 % -- --      Physical Exam  General: Alert, appears well-developed and well-nourished. Cooperative.     In mild distress, actively vomiting into emesis bag  HEENT:  Head:  Atraumatic  Ears:  External ears are normal  Mouth/Throat:  Oropharynx is without erythema or exudate and mucous membranes are moist.   Eyes:   Conjunctivae normal and EOM are normal. No scleral icterus.  CV:  Normal rate, regular rhythm, normal heart sounds and radial pulses are 2+ and symmetric.  No murmur.  Resp:  Breath sounds are clear bilaterally    Non-labored, no retractions or accessory muscle use  GI:  Abdomen is soft, no distension, epigastric tenderness. No rebound or guarding.  No CVA tenderness bilaterally  MS:  Normal range of motion. No edema.    Normal strength in all 4 extremities.     Back atraumatic.    No midline cervical, thoracic, or lumbar tenderness  Skin:  Warm and dry.  No rash or lesions noted.  Neuro: Alert. Normal strength.  GCS: 15  Psych:  Normal mood and affect.    Emergency Department Course   ECG:  ECG taken at 2128, ECG read at 2135  Sinus tachycardia   Biatrial enlargement   Cannot rule out anterior infarct, age undetermined   Abnormal ECG  Rate 102 bpm. SD interval 130. QRS duration 102. QT/QTc 376/490. P-R-T axes 80 69 100.    Imaging:  US Abdomen Limited (RUQ)   Final Result   IMPRESSION:   1.  No acute process demonstrated.            XR Chest 2 Views    (Results Pending)   Report per radiology    Laboratory:  Labs Ordered and " Resulted from Time of ED Arrival to Time of ED Departure   COMPREHENSIVE METABOLIC PANEL - Abnormal       Result Value    Sodium 135      Potassium 3.7      Chloride 104      Carbon Dioxide (CO2) 20      Anion Gap 11      Urea Nitrogen 29      Creatinine 1.27 (*)     Calcium 10.3 (*)     Glucose 246 (*)     Alkaline Phosphatase 85      AST 24      ALT 23      Protein Total 8.8      Albumin 3.8      Bilirubin Total 0.6      GFR Estimate 48 (*)    LIPASE - Abnormal    Lipase 44 (*)    TROPONIN I - Abnormal    Troponin I High Sensitivity 70 (*)    TROPONIN I - Abnormal    Troponin I High Sensitivity 66 (*)    CBC WITH PLATELETS - Normal    WBC Count 11.0      RBC Count 4.92      Hemoglobin 15.6      Hematocrit 45.1      MCV 92      MCH 31.7      MCHC 34.6      RDW 12.8      Platelet Count 223     COVID-19 VIRUS (CORONAVIRUS) BY PCR      Emergency Department Course:  Reviewed:  I reviewed nursing notes, vitals and past medical history    Interventions:  Medications   0.9% sodium chloride BOLUS (0 mLs Intravenous Stopped 1/17/22 2031)   ondansetron (ZOFRAN) injection 4 mg (4 mg Intravenous Given 1/17/22 1903)   ondansetron (ZOFRAN) injection 4 mg (4 mg Intravenous Given 1/17/22 2111)   famotidine (PEPCID) injection 20 mg (20 mg Intravenous Given 1/17/22 2115)      Disposition:  Admitted under the care of Dr. Calle    Impression & Plan    CMS Diagnoses: None    Medical Decision Making:  Patient is a 61-year-old female with a complex past medical history pertinent for fibromyalgia, CKD, CAD status post CABG, type 2 diabetes, gastroparesis, hypertension, and hyperlipidemia who presents with epigastric pain and nausea and vomiting for the last 2 days.  Thankfully lipase, creatinine, and LFTs appear within normal range.  Patient is mildly hyperglycemic here.  CBC unremarkable.  Initial EKG showed nonspecific ST segment changes, but thankfully no acute ischemia.  Troponin was elevated and so concern whether this may  represent demand ischemia in the setting of active vomiting and epigastric pain symptoms versus NSTEMI.  Of note patient was admitted approximately 2 weeks ago for a troponin leak and this was found to be of unclear significance at that occasion.  Her troponin is less elevated than her prior troponin studies from 2 weeks prior.  Lower concern for NSTEMI at this time but in the setting of ongoing epigastric pain, vomiting, and elevated troponin will admit to observation under the care of Dr. Calle.  Patient remains hemodynamically stable under my care in the emergency department.    Diagnosis:    ICD-10-CM    1. Non-intractable vomiting with nausea, unspecified vomiting type  R11.2    2. Abdominal pain, epigastric  R10.13    3. Elevated troponin  R77.8    4. Gastroparesis  K31.84         Discharge Medications:  Current Discharge Medication List         1/17/2022   Albaro Camacho MD White, Scott, MD  01/17/22 2211       Albaro Camacho MD  01/17/22 2212

## 2022-01-18 NOTE — PHARMACY-ADMISSION MEDICATION HISTORY
Pharmacy Medication History  Admission medication history interview status for the 1/17/2022  admission is complete. See EPIC admission navigator for prior to admission medications     Location of Interview: Patient room  Medication history sources: Patient and epic TCU discharge 1/14/22    Significant changes made to the medication list:  none    In the past week, patient estimated taking medication this percent of the time: 50-90% due to other    Additional medication history information:   none    Medication reconciliation completed by provider prior to medication history? Yes    Time spent in this activity: 20 min    Prior to Admission medications    Medication Sig Last Dose Taking? Auth Provider   acetaminophen (TYLENOL) 500 MG tablet Take 1,000 mg by mouth 3 times daily 1/16/2022 Yes Reported, Patient   apixaban ANTICOAGULANT (ELIQUIS) 5 MG tablet Take 1 tablet (5 mg) by mouth 2 times daily 1/16/2022 Yes Tavares Box NP   aspirin 81 MG tablet Take 81 mg by mouth daily 1/16/2022 Yes Reported, Patient   bimatoprost (LUMIGAN) 0.01 % SOLN Place 1 drop into the right eye At Bedtime 1/16/2022 Yes Unknown, Entered By History   brimonidine-timolol (COMBIGAN) 0.2-0.5 % ophthalmic solution Place 1 drop into the right eye 2 times daily 1/16/2022 Yes Unknown, Entered By History   calcium carbonate (TUMS) 500 MG chewable tablet Take 1 chew tab by mouth every 4 hours as needed for heartburn prn Yes Reported, Patient   citalopram (CELEXA) 10 MG tablet Take 1 tablet (10 mg) by mouth daily 1/16/2022 Yes Tavares Box NP   dorzolamide (TRUSOPT) 2 % ophthalmic solution Place 1 drop into the right eye 2 times daily 1/16/2022 Yes Unknown, Entered By History   famotidine (PEPCID) 20 MG tablet Take 20 mg by mouth 2 times daily 1/16/2022 Yes Reported, Patient   gabapentin (NEURONTIN) 600 MG tablet Take 1 tablet (600 mg) by mouth 3 times daily 1/16/2022 Yes Tavares Box NP   insulin glargine (LANTUS PEN) 100 UNIT/ML pen  Inject 25 Units Subcutaneous At Bedtime  Patient taking differently: Inject 20 Units Subcutaneous At Bedtime  1/16/2022 Yes Stewart Galvan MD   lisinopril (ZESTRIL) 5 MG tablet Take 1 tablet (5 mg) by mouth daily 1/16/2022 Yes Tavares Bxo NP   metoprolol succinate ER (TOPROL-XL) 25 MG 24 hr tablet Take 1 tablet (25 mg) by mouth daily 1/16/2022 Yes Tavares Box NP   simvastatin (ZOCOR) 20 MG tablet Take 1 tablet (20 mg) by mouth At Bedtime 1/16/2022 Yes Tavares Box NP   VITAMIN D PO Take 1 tablet by mouth daily OTC: Patient unsure of strength 1/16/2022  Unknown, Entered By History       The information provided in this note is only as accurate as the sources available at the time of update(s)

## 2022-01-18 NOTE — DISCHARGE INSTRUCTIONS
Pt has (1) bag of belongings which includes clothes and shoes     [facility] should contact you for follow up.  Call if you have not heard from them in 1-2 weeks  MINNIE Figueroa  [facility]  Office:  [phone]

## 2022-01-18 NOTE — PROGRESS NOTES
[facility]  Patient is currently open to home care services with [facility]. The patient is currently receiving RN/PT services.  Patient's  and home health team have been notified that patient is under OBSERVATION STATUS. [facility] Liaison will continue to follow patient during stay. If patient is admitted to inpatient status please provide orders to resume home care at time of discharge if appropriate.

## 2022-01-18 NOTE — PLAN OF CARE
"Observation Goals     -diagnostic tests and consults completed and resulted In Progress  -vital signs normal or at patient baseline Met BP (!) 135/92 (BP Location: Left arm)   Pulse 77   Temp 99.1  F (37.3  C) (Oral)   Resp 16   Ht 1.638 m (5' 4.5\")   Wt 81.5 kg (179 lb 10.8 oz)   SpO2 100%   BMI 30.36 kg/m      -tolerating oral intake to maintain hydration Met: tolerating clears, still on IVF  -adequate pain control on oral analgesics Met  Nurse to notify provider when observation goals have been met and patient is ready for discharge.            "

## 2022-01-18 NOTE — CONSULTS
[facility]  Gastroenterology Consultation    Connie Longoria  [address]   [facility] [id number]  61 year old female    Admission Date/Time: 1/17/2022  Primary Care Provider: Jessica Brand    We were asked to see the patient in consultation by Dr. Calle for evaluation of possible gastroparesis.        HPI:  Connie Longoria is a 61 year old female who presents with abdominal pain, nausea, and vomiting.  She reports recurrent episodes every 6 months.  She reports intermittent periumbilical pain.  It is worsened by po intake.  No change in BM.      Normal liver function tests.  Normal lipase.  No CBC.  Ultrasound of the abdomen is normal.    Patient was previously followed by Jo Ann Chapa (last seen in 2016) for a history of chronic hepatitis C with fibrosis.  She completed dual therapy with Solvadi and Olysio in 2014 and viral load remained undetectable after.  MRI Elastography in 2017 showed mild to moderate fibrosis.  EGD in 2014 with gastritis and small hiatal hernia.  There are no notes or radiology system that mention a diagnosis of gastroparesis.  She does believe she had a GES many years ago which showed delayed emptying but I cannot find this report.  The patient does smoke marijuana.  She denies early satiety.  She takes a baby ASA daily but denies other NSAID use.    ROS: A comprehensive ten point review of systems was negative aside from those in mentioned in the HPI.      MEDICATIONS: No current facility-administered medications on file prior to encounter.  acetaminophen (TYLENOL) 500 MG tablet, Take 1,000 mg by mouth 3 times daily  apixaban ANTICOAGULANT (ELIQUIS) 5 MG tablet, Take 1 tablet (5 mg) by mouth 2 times daily  aspirin 81 MG tablet, Take 81 mg by mouth daily  bimatoprost (LUMIGAN) 0.01 % SOLN, Place 1 drop into the right eye At Bedtime  brimonidine-timolol (COMBIGAN) 0.2-0.5 % ophthalmic solution, Place 1 drop into the right eye 2 times  daily  calcium carbonate (TUMS) 500 MG chewable tablet, Take 1 chew tab by mouth every 4 hours as needed for heartburn  citalopram (CELEXA) 10 MG tablet, Take 1 tablet (10 mg) by mouth daily  dorzolamide (TRUSOPT) 2 % ophthalmic solution, Place 1 drop into the right eye 2 times daily  famotidine (PEPCID) 20 MG tablet, Take 20 mg by mouth 2 times daily  gabapentin (NEURONTIN) 600 MG tablet, Take 1 tablet (600 mg) by mouth 3 times daily  insulin glargine (LANTUS PEN) 100 UNIT/ML pen, Inject 25 Units Subcutaneous At Bedtime (Patient taking differently: Inject 20 Units Subcutaneous At Bedtime )  lisinopril (ZESTRIL) 5 MG tablet, Take 1 tablet (5 mg) by mouth daily  metoprolol succinate ER (TOPROL-XL) 25 MG 24 hr tablet, Take 1 tablet (25 mg) by mouth daily  simvastatin (ZOCOR) 20 MG tablet, Take 1 tablet (20 mg) by mouth At Bedtime  VITAMIN D PO, Take 1 tablet by mouth daily OTC: Patient unsure of strength        ALLERGIES:   Allergies   Allergen Reactions     Ascorbate      Other reaction(s): Angioedema  Mouth swells     Atorvastatin      Denies allergy     Effexor [Venlafaxine] Nausea and Vomiting     PN: LW Reaction: gi upset     Other  [No Clinical Screening - See Comments]      Mouth swells  Other reaction(s): Angioedema  PN: LW Other1: -all berry     Rosuvastatin Other (See Comments)     ALT elevated  Alt elevated  Other reaction(s): Other - Describe In Comment Field  ALT elevated     Wellbutrin [Bupropion] Other (See Comments)     Severe HTN even on meds when on wellbutrin  Other reaction(s): Hypertension       Past Medical History:   Diagnosis Date     LAURI (acute kidney injury) (H) 12/16/2015     Atrial fibrillation with RVR (H) 1/3/2022     Congestive heart failure, unspecified      Coronary artery disease      CVA (cerebral vascular accident) (H)      Diabetes (H)      Elevated troponin 12/16/2015     Gastro-oesophageal reflux disease      Glaucoma      Heart attack (H)      Hyperlipidemia      Hypertension       Renal disease     from diabetes and steroid use     Rheumatoid arthritis (H)      Stented coronary artery      Steroid long-term use      Stroke (cerebrum) (H)      Type 2 diabetes mellitus (H) 12/21/2007    Overview:  Severe.  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     IMPLANT VALVE EYE Left 11/3/2015    Procedure: IMPLANT VALVE EYE;  Surgeon: Harlan Harris MD;  Location: [facility]     REVISE GLAUCOMA SHUNT Left 10/9/2015    Procedure: REVISE GLAUCOMA SHUNT;  Surgeon: Harlan Harris MD;  Location: [facility]     REVISE GLAUCOMA SHUNT Left 2/23/2016    Procedure: REVISE GLAUCOMA SHUNT;  Surgeon: Harlan Harris MD;  Location: [facility]     VASCULAR SURGERY      carodid endarectomy         SOCIAL HISTORY:  Social History     Tobacco Use     Smoking status: Current Every Day Smoker     Packs/day: 0.20     Types: Cigarettes     Smokeless tobacco: Never Used     Tobacco comment: #2 cigs / day   Substance Use Topics     Alcohol use: No     Drug use: No       FAMILY HISTORY:  Family History   Problem Relation Age of Onset     Diabetes Mother      Glaucoma Mother      Diabetes Brother      Glaucoma Sister      Diabetes Sister      Hypertension No family hx of      Macular Degeneration No family hx of      Retinal detachment No family hx of        PHYSICAL EXAM:   BP (!) 158/57 (BP Location: Right arm)    "Pulse 98   Temp 98.6  F (37  C) (Oral)   Resp 18   Ht 1.638 m (5' 4.5\")   Wt 81.5 kg (179 lb 10.8 oz)   SpO2 96%   BMI 30.36 kg/m      Constitutional: NAD, comfortable  Cardiovascular: RRR, normal S1 and S2, no r/c/g/m  Respiratory: CTAB  Psychiatric: mentation appears normal and affect normal  Head: Normocephalic. Atraumatic.    Neck: Neck supple. No adenopathy. Thyroid symmetric, normal size, trachea midline  Eyes:  PERRL, no icterus  ENT: Hearing adequate, pharynx normal without erythema or exudate  Abdomen:   Auscultation: + BS  Appearance: non-distended  Palpation: non-tender  NEURO: grossly negative  SKIN: no suspicious lesions or rashes  LYMPH:   anterior cervical: no adenopathy  posterior cervical: no adenopathy  supraclavicular: no adenopathy          ADDITIONAL COMMENTS:   I reviewed the patient's new clinical lab test results.   Recent Labs   Lab Test 01/17/22  1632 01/06/22  0534 01/04/22  0700 11/05/17  0847 11/04/17  0642   WBC 11.0 8.1 9.9   < > 5.2   HGB 15.6 12.2 14.1   < > 12.0   MCV 92 93 93   < > 98    130* 169   < > 124*   INR  --   --   --   --  1.11    < > = values in this interval not displayed.     Recent Labs   Lab Test 01/18/22  0615 01/18/22  0134 01/17/22  1714 01/05/22  0757 01/05/22  0540     --  135  --  135   POTASSIUM 3.5  --  3.7  --  3.2*   CHLORIDE 108  --  104  --  103   CO2 25  --  20  --  27   BUN 36*  --  29  --  42*   CR 1.36*  --  1.27*  --  1.26*   ANIONGAP 6  --  11  --  5   THOMAS 8.9  --  10.3*  --  9.0   * 195* 246*   < > 137*    < > = values in this interval not displayed.     Recent Labs   Lab Test 01/17/22  1714 01/04/22  0700 01/03/22  2341 01/03/22  2038 10/24/19  1340   ALBUMIN 3.8 3.1*  --  3.8 3.8   BILITOTAL 0.6 0.5  --  0.7 0.7   DBIL  --  0.1  --   --   --    ALT 23 19  --  21 22   AST 24 33  --  29 32   ALKPHOS 85 54  --  70 65   PROTEIN  --   --  30 *  --   --    LIPASE 44*  --   --  81 59*             .    CONSULTATION ASSESSMENT " AND PLAN:      62 yo female with history of hepatitis C s/p treatment in 2014 and mild to moderate fibrosis of the liver not seen in clinic since 2016.  Reports diagnosis of gastroparesis.  No previous GES in our system.  Symptoms not typical of gastroparesis with no report of early satiety and symptoms are very intermittent in nature.  She does spoke marijuana and I suspect she may have cannibinoid-induced hyperemesis.  Now presents with epigastric pain/nausea/vomiting, now resolved.  Differential diagnosis includes cannabinoid induced hyperemesis, cyclic vomiting syndrome, gastroparesis, peptic ulcer disease, gastritis/duodenitis.  No signs of gallbladder abnormality on US.  No pancreatitis.    -  Nausea control per primary team.  -  PPI daily.  -  Clear liquid diet, advance as tolerated.  -  GES and/or EGD for further evaluation when feeling better.  Likely can be pursued as outpatient.  -  Recommend complete cessation from marijuana.      I discussed the patient's findings and plan with Dr. Escobar.          Mabel Woody, PAC  [facility]  Office:  [phone] call if needed after 12PM  Cell:  [phone], not available after 12PM at this number

## 2022-01-18 NOTE — PROGRESS NOTES
[facility]    Medicine Progress Note - Hospitalist Service       Date of Admission:  1/17/2022    Assessment & Plan            1) nausea and vomiting with history of GERD likely due to gastroparesis versus cannabinoid hyperemesis syndrome-improving well  -On admission presented with nausea and vomiting of 1 day duration and she was negative for COVID-19 and she underwent ultrasound of the abdomen and it did not show any acute process  -Differentials of the patient include gastroparesis versus cannabis hyperemesis syndrome versus infectious etiology  -She was seen by the GI team and appreciate input and will continue with PPI and she will need to have outpatient endoscopy or even a gastric emptying study as deemed necessary by the GI team  -I will advance her diet and see how she does and we will DC the IV fluids    2) paroxysmal atrial fibrillation  -We will continue the patient with the metoprolol 25 mg daily along with Eliquis 5 mg twice daily    3) elevated troponin with history of coronary artery disease status CABGx4 in 2011  -She did have mild elevation in troponins but she does not have any chest pain and this can be because of stress response but she does have risk factors in the form of diabetes mellitus  -I also reviewed her EKG which does not show any ST changes and patient did not had any chest pain or any anginal symptoms and at baseline does not have any shortness of breath or chest pain and is fairly active at home  -I also reviewed her recent echocardiogram done in the beginning of this month  -We will continue the patient with aspirin, statin, beta-blocker and that she has a follow-up with cardiology pretty soon     4) chronic kidney disease stage III  -Her baseline creatinine is 1.2.1.5  -I did review her creatinine and it is stable and we will continue to monitor and avoid any nephrotoxic agents including contrast    5) diabetes mellitus on insulin  -We will continue with  basal and bolus insulin regimen and we will adjust her insulin based on how she does with her diet    6) diabetic neuropathy  -We will continue the patient with gabapentin    7) hypertension  - we will continue the patient with metoprolol    8) hyperlipidemia  -We will continue with statin    9) glaucoma  -We will continue the patient with her home eyedrops    10) major depressive disorder  -We will continue the patient with citalopram    11) history of CVA in 2007    12) history of left CEA in 2015       Diet: Advance Diet as Tolerated: Clear Liquid Diet    DVT Prophylaxis: DOAC  Urias Catheter: Not present  Central Lines: None  Code Status: Full Code      Disposition Plan   Expected Discharge: 01/19/2022     Anticipated discharge location:  Awaiting care coordination huddle  Delays:            The patient's care was discussed with the Bedside Nurse.    Jihan Sullivan MD  Hospitalist Service  [facility]  Securely message with the Vocera Web Console (learn more here)  Text page via [employer] Paging/Directory        Clinically Significant Risk Factors Present on Admission             # Coagulation Defect: home medication list includes an anticoagulant medication  # Platelet Defect: home medication list includes an antiplatelet medication   # Obesity: last Body mass index is 30.36 kg/m .      ______________________________________________________________________    Interval History     I saw the patient this morning and she mentioned that she does not have any nausea or vomiting but she has not started any eating or drinking.  She denies any chest pain or shortness of breath.  She also mentioned that she never had chest pain on admission.    She did mention that she started having vomiting yesterday and after taking more history she will check that she smokes couple of joints of marijuana daily and I did mention to her that this may be contributing to her nausea and vomiting.  She mentioned that  she does not have any abdominal pain, fever or chills.  She mentioned that she does not have any constipation and has regular bowel movements    I did discuss with the patient and her nurse the plan of care and we will advance the diet as tolerated and if she is able to tolerate the same then she can go home likely tomorrow morning      Data reviewed today: I reviewed all medications, new labs and imaging results over the last 24 hours. I personally reviewed the EKG tracing showing sinus tachycardia .    Physical Exam   Vital Signs: Temp: 99.1  F (37.3  C) Temp src: Oral BP: (!) 135/92 Pulse: 77   Resp: 16 SpO2: 100 % O2 Device: None (Room air)    Weight: 179 lbs 10.8 oz    General.  Patient was laying in the bed and alert and awake and does not seem to be in any distress  Head,  neck, throat and eyes and neck: She does not have any head trauma, pupils are equal round reactive to light, no JVD or any lymphadenopathy  Cardiovascular: S1-S2 normal, no murmur rubs or gallops and no crackles or any edema  Respiratory: She is on room air and is able to talk in full sentences and does not have any wheezing  Abdomen: Soft, nondistended, nontender and bowel sounds are present  Musculoskeletal: Normal range of motion over upper and lower extremities  Psychiatry: Cooperative and non suicidal  Neurological: I did not appreciate any facial droop and strength is 5 x 5 in upper and lower extremities bilaterally      Data   Recent Labs   Lab 01/18/22  1216 01/18/22  0830 01/18/22  0615 01/18/22  0134 01/17/22  1714 01/17/22  1632   0000   WBC  --   --   --   --   --  11.0  --    HGB  --   --   --   --   --  15.6  --    MCV  --   --   --   --   --  92  --    PLT  --   --   --   --   --  223  --    NA  --   --  139  --  135  --   --    POTASSIUM  --   --  3.5  --  3.7  --   --    CHLORIDE  --   --  108  --  104  --   --    CO2  --   --  25  --  20  --   --    BUN  --   --  36*  --  29  --   --    CR  --   --  1.36*  --  1.27*  --    --    ANIONGAP  --   --  6  --  11  --   --    THOMAS  --   --  8.9  --  10.3*  --   --    * 137* 169*   < > 246*  --    < >   ALBUMIN  --   --   --   --  3.8  --   --    PROTTOTAL  --   --   --   --  8.8  --   --    BILITOTAL  --   --   --   --  0.6  --   --    ALKPHOS  --   --   --   --  85  --   --    ALT  --   --   --   --  23  --   --    AST  --   --   --   --  24  --   --    LIPASE  --   --   --   --  44*  --   --     < > = values in this interval not displayed.     Recent Results (from the past 24 hour(s))   US Abdomen Limited (RUQ)    Narrative    EXAM: US ABDOMEN LIMITED  LOCATION: [facility]  DATE/TIME: 1/17/2022 8:19 PM    INDICATION: Abd pain, vomiting  COMPARISON: None.  TECHNIQUE: Limited abdominal ultrasound.    FINDINGS:    GALLBLADDER: Normal. No gallstones, wall thickening, or pericholecystic fluid. Negative sonographic Neal's sign.    BILE DUCTS: No biliary dilatation. The common duct measures 6 mm.    LIVER: Normal parenchyma with smooth contour. No focal mass.    RIGHT KIDNEY: No hydronephrosis.    PANCREAS: The visualized portions are normal.    No ascites.      Impression    IMPRESSION:  1.  No acute process demonstrated.       XR Chest 2 Views    Narrative    EXAM: XR CHEST 2 VW  LOCATION: [facility]  DATE/TIME: 1/17/2022 9:56 PM    INDICATION: epigastric pain, NSTEMI  COMPARISON: 01/03/2022.      Impression    IMPRESSION: Poststernotomy and coronary artery bypass grafting. 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 Mild scarring at the left base. Lungs otherwise clear. No pleural fluid or pneumothorax. Normal heart size and pulmonary   vascularity. Surgical clips in the left neck.     Medications     sodium chloride 125 mL/hr at 01/18/22 0914       acetaminophen  1,000 mg Oral TID     apixaban ANTICOAGULANT  5 mg Oral BID     aspirin  81 mg Oral Daily     bimatoprost  1 drop Right Eye At Bedtime      brimonidine-timolol  1 drop Right Eye BID     citalopram  10 mg Oral Daily     dorzolamide  1 drop Right Eye BID     gabapentin  600 mg Oral TID     insulin aspart  1-7 Units Subcutaneous TID AC     insulin aspart  1-5 Units Subcutaneous At Bedtime     insulin glargine  10 Units Subcutaneous At Bedtime     metoprolol succinate ER  25 mg Oral Daily     pantoprazole (PROTONIX) IV  40 mg Intravenous Daily with breakfast     vitamin D3  25 mcg Oral Daily

## 2022-01-18 NOTE — PROGRESS NOTES
RECEIVING UNIT ED HANDOFF REVIEW    ED Nurse Handoff Report was reviewed by: Jamie Hein RN on January 17, 2022 at 11:18 PM

## 2022-01-18 NOTE — PLAN OF CARE
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 Voiding adequately. C/o nausea, prn IV compazine given x1, prn Zofran available q6h. No BM. Denies nausea currently, no emesis this shift   Diet/Appetite: Clear liquids   Pain: Denies abd pain. Pain free   Skin: WDL   Lines: L PIV infusing NS at 125 ml/hr   Consults/Imaging: GI consult   Labs: Trending troponin   Discharge Plan: 1-2 days   Other: Fall Risk, pt reports recent falls in the last couple weeks

## 2022-01-18 NOTE — H&P
[facility]    History and Physical  Hospitalist       Date of Admission:  1/17/2022  Date of Service (when I saw the patient): 01/17/22    Assessment & Plan   Connie Longoria is a 61 year old female who presents with epigastric pain, vomiting    Suspected gastroparesis  GERD  Reported hx gastroparesis per primary notes. Was on reglan but states this was stopped and put on PPI. States every 6 months this happens. Reports seeing GI years ago for this. Current abdominal pain with n/v, at time of my visit with n/v but no pain. Hypertensive as high as 200 systolic in ED, mildly tachy. RUQ US without acute process.   - prn antiemetics  - GI consult, concern for gastroparesis  - IV pantoprazole daily  - IV fluids, clear liquid diet    NSTEMI, type II/ elevated troponin  CAD s/p CABG x 4 in 2011  HTN  HLD  [needs rec- aspirin 81 mg daily, lisinopril 5 mg daily, metoprolol XL 25 mg daily, simvastatin 20 mg daily]  Recently hospitalized with weakness and abdominal pain. PAF with NSTEMI II noted during hospital stay, cardiology was involved. Echo with EF 50-55%..Troponin 70->66 in ED. CXR no acute process or CHF. EKG without ischemic changes. Low suspicion this is ACS or significant cardiac issue  - telemetry   - repeat troponin x 1 and in am  - continue aspirin 81 mg, metoprolol 25 mg daily  - hold statin and ACE I for now    Paroxysmal atrial fibrillation  [needs rec- apixaban 5 mg BID, metoprolol XL 25 mg daily]  Presented 1/3 with weakness, abdominal pain. Noted to have brief afib with RVR with rise in troponin. [id number]. Started on Eliquis.   - continue Eliquis, metoprolol  - telemetry as above    CKD III  Baseline creatinine 1.1-1.3. creatinine on presentation at 1.27.   - avoid nephrotoxins  - IV fluids  - monitor    DM II, insulin dependent  Diabetic neuropathy  [needs rec- gabapentin 600 mg TID, insulin lantus 20 units at HS]  10/2021 A1C at 6.4%.   - TID and HS blood sugars  - halve home  "lantus to 10 units nightly for now  - clear liquid diet  - med SSI    Hx CVA 2007  Hx L CEA 2015  Noted    Hepatitis C infection  Cirrhosis  Pt reports hepatitis C \"gone.\" LFTs normal on presentation    RA  Not currently on meds for this, follows with Rheumatology.    MDD  [needs rec- citalopram]  - resume with rec    Glaucoma  - resume home eye gtts with rec    COVID-19 negative    DVT Prophylaxis: DOAC  Code Status: Full Code    Disposition: Expected discharge in 2-4 days     Clinton Calle MD  [phone] (P)  Text Page     Primary Care Physician   LILY HERNANDEZ    Chief Complaint   Abdominal pain    History is obtained from the patient and medical records    History of Present Illness   Connie Longoria is a 61 year old female who presents with abdominal pain, nausea and vomiting.  Some evidence that she is a bit of distress from the nausea so history is somewhat limited.  Sounds if she gets episodes like this every 6 months.  She states she cannot eat or drink much but if she waits to eat breakfast the pain comes.  At time of visit she is not having pain but is having nausea.  She reports that the abdominal pain is in her mid abdomen area and does come and go.  She says typically the pain and eating come together.  She denies any chest pain or shortness of breath.  Denies any diarrhea.  She was on Reglan in the past but states she was taken off this and put on Pepcid.  She states she saw a GI doctor years ago she does not remember who.  She reports that her weakness from the previous hospitalization is improved.    Past Medical History    I have reviewed this patient's medical history and updated it with pertinent information if needed.   Past Medical History:   Diagnosis Date     LAURI (acute kidney injury) (H) 12/16/2015     Atrial fibrillation with RVR (H) 1/3/2022     Congestive heart failure, unspecified      Coronary artery disease      CVA (cerebral vascular accident) (H)      Diabetes (H)      Elevated " troponin 12/16/2015     Gastro-oesophageal reflux disease      Glaucoma      Heart attack (H)      Hyperlipidemia      Hypertension      Renal disease     from diabetes and steroid use     Rheumatoid arthritis (H)      Stented coronary artery      Steroid long-term use      Stroke (cerebrum) (H)      Type 2 diabetes mellitus (H) 12/21/2007    Overview:  Severe.  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  Past Surgical History:   Procedure Laterality Date     AS CABG, ARTERY-VEIN, FOUR       CARDIAC SURGERY       COLONOSCOPY       CYCLOPHOTOCOAGULATION TRANSSCLERAL WITH MICROPULSE LASER Right 4/17/2019    Procedure: Right Eye Micropulse Cyclophotocoagulation;  Surgeon: Bethany William MD;  Location:  OR     EYE SURGERY Left      EYE SURGERY Right 11/08/2017    shunt placement     IMPLANT VALVE EYE Left 9/18/2015    Procedure: IMPLANT VALVE EYE;  Surgeon: Harlan Harris MD;  Location: [facility]     IMPLANT VALVE EYE Left 11/3/2015    Procedure: IMPLANT VALVE EYE;  Surgeon: Harlan Harris MD;  Location: [facility]     REVISE GLAUCOMA SHUNT Left 10/9/2015    Procedure: REVISE GLAUCOMA SHUNT;  Surgeon: Harlan Harris MD;  Location: [facility]     REVISE GLAUCOMA SHUNT Left 2/23/2016    Procedure: REVISE GLAUCOMA SHUNT;  Surgeon: Harlan Harris MD;  Location: [facility]     VASCULAR SURGERY      carodid endarectomy       Prior to Admission Medications   Prior to Admission Medications   Prescriptions Last Dose Informant Patient Reported? Taking?   VITAMIN D PO   Yes No   Sig: Take 1 tablet by mouth daily OTC: Patient unsure of strength   acetaminophen (TYLENOL) 500 MG tablet   Yes No   Sig: Take 1,000 mg by mouth 3 times daily   apixaban ANTICOAGULANT (ELIQUIS) 5 MG tablet   No  No   Sig: Take 1 tablet (5 mg) by mouth 2 times daily   aspirin 81 MG tablet   Yes No   Sig: Take 81 mg by mouth daily   bimatoprost (LUMIGAN) 0.01 % SOLN   Yes No   Sig: Place 1 drop into the right eye At Bedtime   brimonidine-timolol (COMBIGAN) 0.2-0.5 % ophthalmic solution   Yes No   Sig: Place 1 drop into the right eye 2 times daily   calcium carbonate (TUMS) 500 MG chewable tablet   Yes No   Sig: Take 1 chew tab by mouth every 4 hours as needed for heartburn   citalopram (CELEXA) 10 MG tablet   No No   Sig: Take 1 tablet (10 mg) by mouth daily   dorzolamide (TRUSOPT) 2 % ophthalmic solution   Yes No   Sig: Place 1 drop into the right eye 2 times daily   famotidine (PEPCID) 20 MG tablet   Yes No   Sig: Take 20 mg by mouth 2 times daily   gabapentin (NEURONTIN) 600 MG tablet   No No   Sig: Take 1 tablet (600 mg) by mouth 3 times daily   insulin glargine (LANTUS PEN) 100 UNIT/ML pen   No No   Sig: Inject 25 Units Subcutaneous At Bedtime   Patient taking differently: Inject 20 Units Subcutaneous At Bedtime    lisinopril (ZESTRIL) 5 MG tablet   No No   Sig: Take 1 tablet (5 mg) by mouth daily   metoprolol succinate ER (TOPROL-XL) 25 MG 24 hr tablet   No No   Sig: Take 1 tablet (25 mg) by mouth daily   simvastatin (ZOCOR) 20 MG tablet   No No   Sig: Take 1 tablet (20 mg) by mouth At Bedtime      Facility-Administered Medications: None     Allergies   Allergies   Allergen Reactions     Ascorbate      Other reaction(s): Angioedema  Mouth swells     Atorvastatin      Denies allergy     Effexor [Venlafaxine] Nausea and Vomiting     PN: LW Reaction: gi upset     Other  [No Clinical Screening - See Comments]      Mouth swells  Other reaction(s): Angioedema  PN: LW Other1: -all berry     Rosuvastatin Other (See Comments)     ALT elevated  Alt elevated  Other reaction(s): Other - Describe In Comment Field  ALT elevated     Wellbutrin [Bupropion] Other (See Comments)     Severe HTN even on meds when on wellbutrin  Other  reaction(s): Hypertension       Social History   I have reviewed this patient's social history and updated it with pertinent information if needed. Connie Longoria  reports that she has been smoking cigarettes. She has been smoking about 0.20 packs per day. She has never used smokeless tobacco. She reports that she does not drink alcohol and does not use drugs.    Family History   I have reviewed this patient's family history and updated it with pertinent information if needed.   Family History   Problem Relation Age of Onset     Diabetes Mother      Glaucoma Mother      Diabetes Brother      Glaucoma Sister      Diabetes Sister      Hypertension No family hx of      Macular Degeneration No family hx of      Retinal detachment No family hx of        Review of Systems   The 10 point Review of Systems is negative other than noted in the HPI or here.     Physical Exam   Temp: 98.2  F (36.8  C) Temp src: Oral BP: (!) 201/112 Pulse: 103   Resp: 20 SpO2: 100 % O2 Device: None (Room air)    Vital Signs with Ranges  190 lbs 0 oz    Constitutional: alert, oriented, distressed from n/v  Eyes: EOMI, L eye opacified, R eye ok.   HEENT: OP clear  Respiratory: CTA B without w/c  Cardiovascular: RRR no murmur. no edema.  GI: soft, nontender, nondistended, BS present  Lymph/Hematologic: no cervical LAD  Genitourinary: deferred  Skin: no rashes or lesions grossly  Musculoskeletal: no deformities or arthritis  Neurologic: CN II-XII, ABARCA  Psychiatric: mood and affect wnl    Data   Data reviewed today:  I personally reviewed the EKG tracing showing NSR and the chest x-ray image(s) showing no acute process. RUQ US without gallbladder pathology  Recent Labs   Lab 01/17/22  1714 01/17/22  1632   WBC  --  11.0   HGB  --  15.6   MCV  --  92   PLT  --  223     --    POTASSIUM 3.7  --    CHLORIDE 104  --    CO2 20  --    BUN 29  --    CR 1.27*  --    ANIONGAP 11  --    THOMAS 10.3*  --    *  --    ALBUMIN 3.8  --    PROTTOTAL 8.8  --     BILITOTAL 0.6  --    ALKPHOS 85  --    ALT 23  --    AST 24  --    LIPASE 44*  --        Recent Results (from the past 24 hour(s))   US Abdomen Limited (RUQ)    Narrative    EXAM: US ABDOMEN LIMITED  LOCATION: [facility]  DATE/TIME: 1/17/2022 8:19 PM    INDICATION: Abd pain, vomiting  COMPARISON: None.  TECHNIQUE: Limited abdominal ultrasound.    FINDINGS:    GALLBLADDER: Normal. No gallstones, wall thickening, or pericholecystic fluid. Negative sonographic Neal's sign.    BILE DUCTS: No biliary dilatation. The common duct measures 6 mm.    LIVER: Normal parenchyma with smooth contour. No focal mass.    RIGHT KIDNEY: No hydronephrosis.    PANCREAS: The visualized portions are normal.    No ascites.      Impression    IMPRESSION:  1.  No acute process demonstrated.       XR Chest 2 Views    Narrative    EXAM: XR CHEST 2 VW  LOCATION: [facility]  DATE/TIME: 1/17/2022 9:56 PM    INDICATION: epigastric pain, NSTEMI  COMPARISON: 01/03/2022.      Impression    IMPRESSION: Poststernotomy and coronary artery bypass grafting. 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 Mild scarring at the left base. Lungs otherwise clear. No pleural fluid or pneumothorax. Normal heart size and pulmonary   vascularity. Surgical clips in the left neck.

## 2022-01-18 NOTE — PLAN OF CARE
Observation goals        -diagnostic tests and consults completed and resulted: not met. GI consult pending   -vital signs normal or at patient baseline: not met, hypertensive   -tolerating oral intake to maintain hydration: not met   -adequate pain control on oral analgesics: met     Nurse to notify provider when observation goals have been met and patient is ready for discharge.

## 2022-01-18 NOTE — PLAN OF CARE
"Observation Goals    -diagnostic tests and consults completed and resulted Not Met:GI to see  -vital signs normal or at patient baseline Met BP (!) 148/94 (BP Location: Left arm)   Pulse 98   Temp 98.2  F (36.8  C) (Oral)   Resp 16   Ht 1.638 m (5' 4.5\")   Wt 81.5 kg (179 lb 10.8 oz)   SpO2 100%   BMI 30.36 kg/m      -tolerating oral intake to maintain hydration Met: tolerating clears, still on IVF  -adequate pain control on oral analgesics Met  Nurse to notify provider when observation goals have been met and patient is ready for discharge.  "

## 2022-01-18 NOTE — PLAN OF CARE
"Observation Goals     -diagnostic tests and consults completed and resulted In Progress  -vital signs normal or at patient baseline Met /69 (BP Location: Left arm)   Pulse 62   Temp 98.4  F (36.9  C) (Oral)   Resp 18   Ht 1.638 m (5' 4.5\")   Wt 81.5 kg (179 lb 10.8 oz)   SpO2 100%   BMI 30.36 kg/m      -tolerating oral intake to maintain hydration Met: tolerating clears, diet advanced to regular for dinner  -adequate pain control on oral analgesics Met  Nurse to notify provider when observation goals have been met and patient is ready for discharge.    Pt is A&Ox4, VSS, and tolerating a clear liquid diet, will try regular for dinner. Pt is up with SBA, states pain and nausea are much improved since yesterday. Seen by GI today, see note for details. PIV running NS @75mL/hr. Pt to stay overnight and pending advancement of diet, could discharge tomorrow. Nurse will continue to monitor.  "

## 2022-01-19 VITALS
HEART RATE: 60 BPM | WEIGHT: 179.68 LBS | DIASTOLIC BLOOD PRESSURE: 61 MMHG | HEIGHT: 65 IN | BODY MASS INDEX: 29.94 KG/M2 | RESPIRATION RATE: 18 BRPM | OXYGEN SATURATION: 100 % | TEMPERATURE: 98.3 F | SYSTOLIC BLOOD PRESSURE: 111 MMHG

## 2022-01-19 LAB
ANION GAP SERPL CALCULATED.3IONS-SCNC: 5 MMOL/L (ref 3–14)
BUN SERPL-MCNC: 24 MG/DL (ref 7–30)
CALCIUM SERPL-MCNC: 9.1 MG/DL (ref 8.5–10.1)
CHLORIDE BLD-SCNC: 107 MMOL/L (ref 94–109)
CO2 SERPL-SCNC: 23 MMOL/L (ref 20–32)
CREAT SERPL-MCNC: 1.14 MG/DL (ref 0.52–1.04)
GFR SERPL CREATININE-BSD FRML MDRD: 54 ML/MIN/1.73M2
GLUCOSE BLD-MCNC: 132 MG/DL (ref 70–99)
GLUCOSE BLDC GLUCOMTR-MCNC: 111 MG/DL (ref 70–99)
GLUCOSE BLDC GLUCOMTR-MCNC: 178 MG/DL (ref 70–99)
GLUCOSE BLDC GLUCOMTR-MCNC: 88 MG/DL (ref 70–99)
GLUCOSE BLDC GLUCOMTR-MCNC: 99 MG/DL (ref 70–99)
POTASSIUM BLD-SCNC: 3.6 MMOL/L (ref 3.4–5.3)
SODIUM SERPL-SCNC: 135 MMOL/L (ref 133–144)

## 2022-01-19 PROCEDURE — 250N000013 HC RX MED GY IP 250 OP 250 PS 637: Performed by: INTERNAL MEDICINE

## 2022-01-19 PROCEDURE — 250N000011 HC RX IP 250 OP 636: Performed by: INTERNAL MEDICINE

## 2022-01-19 PROCEDURE — 96376 TX/PRO/DX INJ SAME DRUG ADON: CPT

## 2022-01-19 PROCEDURE — 99217 PR OBSERVATION CARE DISCHARGE: CPT | Performed by: HOSPITALIST

## 2022-01-19 PROCEDURE — 36415 COLL VENOUS BLD VENIPUNCTURE: CPT | Performed by: HOSPITALIST

## 2022-01-19 PROCEDURE — C9113 INJ PANTOPRAZOLE SODIUM, VIA: HCPCS | Performed by: INTERNAL MEDICINE

## 2022-01-19 PROCEDURE — 82962 GLUCOSE BLOOD TEST: CPT | Mod: 91

## 2022-01-19 PROCEDURE — G0378 HOSPITAL OBSERVATION PER HR: HCPCS

## 2022-01-19 PROCEDURE — 80048 BASIC METABOLIC PNL TOTAL CA: CPT | Performed by: HOSPITALIST

## 2022-01-19 RX ORDER — ONDANSETRON 4 MG/1
4 TABLET, ORALLY DISINTEGRATING ORAL EVERY 8 HOURS PRN
Qty: 12 TABLET | Refills: 0 | Status: SHIPPED | OUTPATIENT
Start: 2022-01-19

## 2022-01-19 RX ADMIN — GABAPENTIN 600 MG: 600 TABLET, FILM COATED ORAL at 15:30

## 2022-01-19 RX ADMIN — METOPROLOL SUCCINATE 25 MG: 25 TABLET, EXTENDED RELEASE ORAL at 07:59

## 2022-01-19 RX ADMIN — ACETAMINOPHEN 1000 MG: 500 TABLET ORAL at 15:30

## 2022-01-19 RX ADMIN — GABAPENTIN 600 MG: 600 TABLET, FILM COATED ORAL at 07:59

## 2022-01-19 RX ADMIN — ASPIRIN 81 MG: 81 TABLET, COATED ORAL at 07:59

## 2022-01-19 RX ADMIN — ACETAMINOPHEN 1000 MG: 500 TABLET ORAL at 07:59

## 2022-01-19 RX ADMIN — PANTOPRAZOLE SODIUM 40 MG: 40 INJECTION, POWDER, FOR SOLUTION INTRAVENOUS at 08:00

## 2022-01-19 RX ADMIN — CITALOPRAM HYDROBROMIDE 10 MG: 10 TABLET ORAL at 07:59

## 2022-01-19 RX ADMIN — APIXABAN 5 MG: 5 TABLET, FILM COATED ORAL at 07:59

## 2022-01-19 RX ADMIN — Medication 25 MCG: at 07:59

## 2022-01-19 NOTE — CONSULTS
Care Management Initial Consult    General Information  Assessment completed with: Patient,    Type of CM/SW Visit: Offer D/C Planning    Primary Care Provider verified and updated as needed: Yes   Readmission within the last 30 days:        Reason for Consult: discharge planning  Advance Care Planning:            Communication Assessment  Patient's communication style: spoken language (English or Bilingual)    Hearing Difficulty or Deaf: no   Wear Glasses or Blind: yes    Cognitive  Cognitive/Neuro/Behavioral: WDL                      Living Environment:   People in home: alone     Current living Arrangements:        Able to return to prior arrangements: yes       Family/Social Support:  Care provided by: self  Provides care for: no one                Description of Support System:           Current Resources:   Patient receiving home care services:       Community Resources:    Equipment currently used at home: grab bar, toilet,grab bar, tub/shower  Supplies currently used at home:      Employment/Financial:  Employment Status:          Financial Concerns: No concerns identified           Lifestyle & Psychosocial Needs:  Social Determinants of Health     Tobacco Use: High Risk     Smoking Tobacco Use: Current Every Day Smoker     Smokeless Tobacco Use: Never Used   Alcohol Use: Not on file   Financial Resource Strain: Not on file   Food Insecurity: Not on file   Transportation Needs: Not on file   Physical Activity: Not on file   Stress: Not on file   Social Connections: Not on file   Intimate Partner Violence: Not on file   Depression: Not at risk     PHQ-2 Score: 0   Housing Stability: Not on file       Functional Status:  Prior to admission patient needed assistance:              Mental Health Status:          Chemical Dependency Status:                Values/Beliefs:  Spiritual, Cultural Beliefs, Quaker Practices, Values that affect care:                 Additional Information:  Met with patient to discuss  role in discharge planning. Patient discharge today with resumption of HHC.  Information added to AVS and [facility] updated by email of discharge. See note from [facility] Liaison that patient was open to them for HH RN/PT.  Orders in to Resume HHC.     Pt would like Follow-up made with PCP.  Following made and added to AVS.   Follow-up and recommended labs and tests        Follow up with Primary MD  Dr. Brand  Monday Janurary 24th  10:24am   [phone] [facility] [address] [facility], [place]*         Pt notes she uses [employer] to get to appointments.   Pt's son can pick her up this afternoon.  Pt voices no other needs for discharge.   Bedside RN to review AVS    Care Management Discharge Note    Discharge Date: 01/19/2022       Discharge Disposition: Home,Home Care    Discharge Services: None    Discharge DME: None    Discharge Transportation: family or friend will provide    Private pay costs discussed: Not applicable    PAS Confirmation Code:    Patient/family educated on Medicare website which has current facility and service quality ratings:      Education Provided on the Discharge Plan:    Persons Notified of Discharge Plans: Pt/charge RN  Patient/Family in Agreement with the Plan: yes    Handoff Referral Completed: No    Additional Information:  See AVS for Follow-up appointments.   [facility] phone number added if Follow-up needed with them.             Za Mosley RN

## 2022-01-19 NOTE — PLAN OF CARE
"Observation Goals     -diagnostic tests and consults completed and resulted Met  -vital signs normal or at patient baseline Met /61 (BP Location: Left arm)   Pulse 60   Temp 98.3  F (36.8  C) (Oral)   Resp 18   Ht 1.638 m (5' 4.5\")   Wt 81.5 kg (179 lb 10.8 oz)   SpO2 100%   BMI 30.36 kg/m    -tolerating oral intake to maintain hydration Met  -adequate pain control on oral analgesics Met  Nurse to notify provider when observation goals have been met and patient is ready for discharge.  "

## 2022-01-19 NOTE — PLAN OF CARE
"Observation Goals    -diagnostic tests and consults completed and resulted Met  -vital signs normal or at patient baseline Met BP (!) 140/77 (BP Location: Left arm)   Pulse 64   Temp 98.9  F (37.2  C) (Oral)   Resp 16   Ht 1.638 m (5' 4.5\")   Wt 81.5 kg (179 lb 10.8 oz)   SpO2 97%   BMI 30.36 kg/m      -tolerating oral intake to maintain hydration Met  -adequate pain control on oral analgesics Met  Nurse to notify provider when observation goals have been met and patient is ready for discharge.  "

## 2022-01-19 NOTE — PROGRESS NOTES
"  GASTROENTEROLOGY PROGRESS NOTE     IMPRESSION:  1.  Acute nausea vomiting- symptoms improved, may have represented an infectious gastroenteritis.  2.  History of recurrent, intermittent nausea and vomiting- patient reports possible gastroparesis, however, on the last gastric emptying study in , it was negative for gastroparesis.  We will have patient follow-up in our clinic for ongoing management, may need to consider repeat EGD and gastric emptying study.  3.  History hepatitis C, status post successful treatment- mild to moderate fibrosis in the liver.    RECOMMENDATIONS:  - Okay to discharge today.  - Daily PPI.  - Outpatient follow-up with [facility] clinic ordered yesterday, we will contact patient.  - We will sign off.  Please call with any questions or concerns.      Albaro Escobar MD  [facility]  [phone]      ________________________________________________________________________      SUBJECTIVE:  Patient is feeling better.  No nausea or vomiting.  Tolerating her diet.       OBJECTIVE:  BP (!) 140/77 (BP Location: Left arm)   Pulse 64   Temp 98.9  F (37.2  C) (Oral)   Resp 16   Ht 1.638 m (5' 4.5\")   Wt 81.5 kg (179 lb 10.8 oz)   SpO2 97%   BMI 30.36 kg/m    Temp (24hrs), Av.6  F (37  C), Min:98.4  F (36.9  C), Max:98.9  F (37.2  C)    Patient Vitals for the past 72 hrs:   Weight   22 0100 81.5 kg (179 lb 10.8 oz)   22 1910 86.2 kg (190 lb)        PHYSICAL EXAM  GEN: Alert, NAD.    HRT: reg  LUNGS: CTA  ABD: +BS, non-tender, non-distended, no rebound or guarding.    Additional Data:  I have reviewed the patient's new clinical lab results:     Recent Labs   Lab Test 22  1632 22  0534 22  0700 17  0847 17  0642   WBC 11.0 8.1 9.9   < > 5.2   HGB 15.6 12.2 14.1   < > 12.0   MCV 92 93 93   < > 98    130* 169   < > 124*   INR  --   --   --   --  1.11    < > = values in this interval not displayed.     Recent Labs   Lab Test " 01/19/22  0934 01/19/22  0740 01/19/22  0602 01/18/22  0830 01/18/22  0615 01/18/22  0134 01/17/22  1714     --   --   --  139  --  135   POTASSIUM 3.6  --   --   --  3.5  --  3.7   CHLORIDE 107  --   --   --  108  --  104   CO2 23  --   --   --  25  --  20   BUN 24  --   --   --  36*  --  29   CR 1.14*  --   --   --  1.36*  --  1.27*   ANIONGAP 5  --   --   --  6  --  11   THOMAS 9.1  --   --   --  8.9  --  10.3*   * 111* 99   < > 169*   < > 246*    < > = values in this interval not displayed.     Recent Labs   Lab Test 01/17/22  1714 01/04/22  0700 01/03/22  2341 01/03/22  2038 10/24/19  1340   ALBUMIN 3.8 3.1*  --  3.8 3.8   BILITOTAL 0.6 0.5  --  0.7 0.7   ALT 23 19  --  21 22   AST 24 33  --  29 32   PROTEIN  --   --  30 *  --   --    LIPASE 44*  --   --  81 59*

## 2022-01-19 NOTE — PROGRESS NOTES
Patient is alert na oriented time 4. VSS on RA,SBA, occasional incontinence, wears incontinent pad  and is currently on a clear liquid diet. Denies pain, nausea and vomiting. PIV saline locked. Blood glucose was 88 and 99 this shift.  Discharge pending improvement in overall health. GI following.

## 2022-01-19 NOTE — DISCHARGE SUMMARY
[facility]  Hospitalist Discharge Summary      Date of Admission:  1/17/2022  Date of Discharge:  1/19/2022  Discharging Provider: Jihan Sullivan MD  Discharge Service: Hospitalist Service    Discharge Diagnoses     Nausea and vomiting     Follow-ups Needed After Discharge   Follow-up Appointments     Follow-up and recommended labs and tests       Follow up with Primary MD  Dr. Hernandez  Monday Janurary 24th  10:24am  [phone] [facility] [address], [place]*           Follow-up and recommended labs and tests       Follow up with primary care provider, LILY HERNANDEZ, within 7 days for   hospital follow- up.  No follow up labs or test are needed.         {Additional follow-up instructions/to-do's for PCP    :none    Unresulted Labs Ordered in the Past 30 Days of this Admission     No orders found from 12/18/2021 to 1/18/2022.      These results will be followed up by     Discharge Disposition   Discharged to home  Condition at discharge: Stable      Hospital Course     This is a very pleasant 61-year-old female with medical history which includes chronic kidney disease stage III, diabetes mellitus, paroxysmal atrial fibrillation on anticoagulation and beta-blocker, history of coronary artery disease status CABG x4 in 2011 and had recent elevation in troponin and has follow-up with cardiology on 1/21, diabetic neuropathy, hypertension, hyperlipidemia, glaucoma, major depressive  disorder who presented to the hospital with a chief complaint of nausea and vomiting without any abdominal pain.  She denies any chest pain, or any shortness of breath.  She denies any anginal symptoms.    On presentation to the ER she also had mildly elevated troponin without any EKGs and she had lab work done including COVID-19 which was negative and she also under went ultrasound of the abdomen which did not show any acute process.  Patient does use marijuana actively and GI team was  consulted and patient was continued on Protonix, IV fluids and nausea medications and her clinical condition improved during the course of hospital stay and on day of discharge she was able to tolerate diet and was comfortable going home.  Patient will need to follow as outpatient with [facility] and they have put follow-up and patient will be contacted by them and she may need further work-up in the form of gastric emptying study and EGD if the process read by the GI team.  Patient was also advised that she should stop using marijuana and she understood and verbalized the same.    I also communicated to the patient that in case she starts having chest pain or shortness of breath or dizziness or she does not feel herself then she should call 911 and go to the nearest ER.  Patient will be following with cardiology team on 1/21 and was made aware of the same and I personally checked the appointment in  the epic    She will need to follow-up as outpatient with her primary care doctor within 3 to 4 days of discharge.  Case management team was also involved in discharge planning and her medications were sent to [facility] pharmacy in [place] as per her request    More than 30 minutes spent in the discharge of the patient    Consultations This Hospital Stay   GASTROENTEROLOGY IP CONSULT  CARE MANAGEMENT / SOCIAL WORK IP CONSULT    Code Status   Full Code    Time Spent on this Encounter   I, Jihan Sullivan MD, personally saw the patient today and spent greater than 30 minutes discharging this patient.       Jihan Sullivan MD  [facility]TEND[facility]OV[facility] SHORT STAY  [address] [place] [id number]  Phone: [phone]  ______________________________________________________________________    Physical Exam   Vital Signs: Temp: 98.3  F (36.8  C) Temp src: Oral BP: 111/61 Pulse: 60   Resp: 18 SpO2: 100 % O2 Device: None (Room air)    Weight: 179 lbs 10.8 oz    General.  aox3  Head,   neck, throat and eyes and neck: atraumatic ,   Cardiovascular: S1-S2 normal, no murmur rubs or gallops and no crackles or any edema  Respiratory: She is on room air and is able to talk in full sentences and does not have any wheezing  Abdomen: Soft, nondistended, nontender  Musculoskeletal: Normal range of motion over upper and lower extremities  Psychiatry: Cooperative  Neurological: no facial droop          Primary Care Physician   LILY BRAND    Discharge Orders      Home care nursing referral      Home Care PT Referral for Hospital Discharge      Follow-up and recommended labs and tests     Follow up with Primary MD  Dr. Brand  Monday Janurary 24th  10:24am  [phone] [facility] [address]     Reason for your hospital stay    Nausea and vomiting     Follow-up and recommended labs and tests     Follow up with primary care provider, LILY BRAND, within 7 days for hospital follow- up.  No follow up labs or test are needed.     Activity    Your activity upon discharge: activity as tolerated     Discharge Instructions    Please avoid using marijuana   If you develop chest pain or shortness of breath then please call 911 and go to the nearest ER     Discharge patient     Diet    Follow this diet upon discharge: Diabetic       Significant Results and Procedures   Most Recent 3 BMP's:Recent Labs   Lab Test 01/19/22  1205 01/19/22  0934 01/19/22  0740 01/18/22  0830 01/18/22  0615 01/18/22  0134 01/17/22  1714   NA  --  135  --   --  139  --  135   POTASSIUM  --  3.6  --   --  3.5  --  3.7   CHLORIDE  --  107  --   --  108  --  104   CO2  --  23  --   --  25  --  20   BUN  --  24  --   --  36*  --  29   CR  --  1.14*  --   --  1.36*  --  1.27*   ANIONGAP  --  5  --   --  6  --  11   THOMAS  --  9.1  --   --  8.9  --  10.3*   * 132* 111*   < > 169*   < > 246*    < > = values in this interval not displayed.   ,   Results for orders placed or performed during the hospital  encounter of 01/17/22   US Abdomen Limited (RUQ)    Narrative    EXAM: US ABDOMEN LIMITED  LOCATION: [facility]  DATE/TIME: 1/17/2022 8:19 PM    INDICATION: Abd pain, vomiting  COMPARISON: None.  TECHNIQUE: Limited abdominal ultrasound.    FINDINGS:    GALLBLADDER: Normal. No gallstones, wall thickening, or pericholecystic fluid. Negative sonographic Neal's sign.    BILE DUCTS: No biliary dilatation. The common duct measures 6 mm.    LIVER: Normal parenchyma with smooth contour. No focal mass.    RIGHT KIDNEY: No hydronephrosis.    PANCREAS: The visualized portions are normal.    No ascites.      Impression    IMPRESSION:  1.  No acute process demonstrated.       XR Chest 2 Views    Narrative    EXAM: XR CHEST 2 VW  LOCATION: [facility]  DATE/TIME: 1/17/2022 9:56 PM    INDICATION: epigastric pain, NSTEMI  COMPARISON: 01/03/2022.      Impression    IMPRESSION: Poststernotomy and coronary artery bypass grafting. 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 Mild scarring at the left base. Lungs otherwise clear. No pleural fluid or pneumothorax. Normal heart size and pulmonary   vascularity. Surgical clips in the left neck.       Discharge Medications   Current Discharge Medication List      START taking these medications    Details   omeprazole (PRILOSEC) 20 MG DR capsule Take 1 capsule (20 mg) by mouth daily  Qty: 14 capsule, Refills: 0    Associated Diagnoses: Non-intractable vomiting with nausea, unspecified vomiting type      ondansetron (ZOFRAN-ODT) 4 MG ODT tab Take 1 tablet (4 mg) by mouth every 8 hours as needed for nausea  Qty: 12 tablet, Refills: 0    Associated Diagnoses: Non-intractable vomiting with nausea, unspecified vomiting type         CONTINUE these medications which have NOT CHANGED    Details   acetaminophen (TYLENOL) 500 MG tablet Take 1,000 mg by mouth 3 times daily      apixaban ANTICOAGULANT (ELIQUIS) 5 MG tablet Take 1  tablet (5 mg) by mouth 2 times daily  Qty: 60 tablet, Refills: 0    Associated Diagnoses: Paroxysmal atrial fibrillation (H)      aspirin 81 MG tablet Take 81 mg by mouth daily      bimatoprost (LUMIGAN) 0.01 % SOLN Place 1 drop into the right eye At Bedtime      brimonidine-timolol (COMBIGAN) 0.2-0.5 % ophthalmic solution Place 1 drop into the right eye 2 times daily      calcium carbonate (TUMS) 500 MG chewable tablet Take 1 chew tab by mouth every 4 hours as needed for heartburn      citalopram (CELEXA) 10 MG tablet Take 1 tablet (10 mg) by mouth daily  Qty: 30 tablet, Refills: 0    Associated Diagnoses: Current moderate episode of major depressive disorder, unspecified whether recurrent (H)      dorzolamide (TRUSOPT) 2 % ophthalmic solution Place 1 drop into the right eye 2 times daily      gabapentin (NEURONTIN) 600 MG tablet Take 1 tablet (600 mg) by mouth 3 times daily  Qty: 60 tablet, Refills: 0    Associated Diagnoses: Other diabetic neurological complication associated with type 2 diabetes mellitus (H)      insulin glargine (LANTUS PEN) 100 UNIT/ML pen Inject 25 Units Subcutaneous At Bedtime  Qty: 15 mL    Comments: If Lantus is not covered by insurance, may substitute Basaglar at same dose and frequency.    Associated Diagnoses: Type 2 diabetes mellitus with stage 3a chronic kidney disease, with long-term current use of insulin (H)      lisinopril (ZESTRIL) 5 MG tablet Take 1 tablet (5 mg) by mouth daily  Qty: 30 tablet, Refills: 0    Associated Diagnoses: Essential hypertension      metoprolol succinate ER (TOPROL-XL) 25 MG 24 hr tablet Take 1 tablet (25 mg) by mouth daily  Qty: 30 tablet, Refills: 0    Associated Diagnoses: Essential hypertension      simvastatin (ZOCOR) 20 MG tablet Take 1 tablet (20 mg) by mouth At Bedtime  Qty: 30 tablet, Refills: 0    Associated Diagnoses: History of CVA (cerebrovascular accident)      VITAMIN D PO Take 1 tablet by mouth daily OTC: Patient unsure of strength          STOP taking these medications       famotidine (PEPCID) 20 MG tablet Comments:   Reason for Stopping:             Allergies   Allergies   Allergen Reactions     Ascorbate      Other reaction(s): Angioedema  Mouth swells     Atorvastatin      Denies allergy     Effexor [Venlafaxine] Nausea and Vomiting     PN: LW Reaction: gi upset     Other  [No Clinical Screening - See Comments]      Mouth swells  Other reaction(s): Angioedema  PN: LW Other1: -all berry     Rosuvastatin Other (See Comments)     ALT elevated  Alt elevated  Other reaction(s): Other - Describe In Comment Field  ALT elevated     Wellbutrin [Bupropion] Other (See Comments)     Severe HTN even on meds when on wellbutrin  Other reaction(s): Hypertension

## 2022-01-20 ENCOUNTER — PATIENT OUTREACH (OUTPATIENT)
Dept: CARE COORDINATION | Facility: CLINIC | Age: 62
End: 2022-01-20
Payer: MEDICARE

## 2022-01-20 DIAGNOSIS — Z71.89 OTHER SPECIFIED COUNSELING: ICD-10-CM

## 2022-01-20 NOTE — PROGRESS NOTES
Clinic Care Coordination Contact  [facility]: Post-Discharge Note  SITUATION                                                      Admission:    Admission Date: 01/17/22   Reason for Admission: Nausea and vomiting  Discharge:   Discharge Date: 01/19/22  Discharge Diagnosis: Nausea and vomiting    BACKGROUND                                                      Connie Longoria is a 61 year old female who presents with abdominal pain, nausea and vomiting.  Some evidence that she is a bit of distress from the nausea so history is somewhat limited.  Sounds if she gets episodes like this every 6 months.  She states she cannot eat or drink much but if she waits to eat breakfast the pain comes.  At time of visit she is not having pain but is having nausea.  She reports that the abdominal pain is in her mid abdomen area and does come and go.  She says typically the pain and eating come together.  She denies any chest pain or shortness of breath.  Denies any diarrhea.  She was on Reglan in the past but states she was taken off this and put on Pepcid.  She states she saw a GI doctor years ago she does not remember who.  She reports that her weakness from the previous hospitalization is improved.    ASSESSMENT      Enrollment  Primary Care Care Coordination Status: Not a Candidate    Discharge Assessment  How are you doing now that you are home?: I am doing good  How are your symptoms? (Red Flag symptoms escalate to triage hotline per guidelines): Unchanged  Do you feel your condition is stable enough to be safe at home until your provider visit?: Yes  Does the patient have their discharge instructions? : Yes  Does the patient have questions regarding their discharge instructions? : No  Were you started on any new medications or were there changes to any of your previous medications? : Yes  Does the patient have all of their medications?: Yes  Do you have questions regarding any of your medications? : No  Do you have all of  your needed medical supplies or equipment (DME)?  (i.e. oxygen tank, CPAP, cane, etc.): Yes  Discharge follow-up appointment scheduled within 14 calendar days? : Yes (Patient also has a follow up on Monday with her PCP)  Discharge Follow Up Appointment Date: 01/21/22  Discharge Follow Up Appointment Scheduled with?: Specialty Care Provider                  PLAN                                                      Outpatient Plan: Follow up with Primary MD Dr. Brand Monday Janurary 24th 10:24am  [phone] [facility] [address],  [place]*    Future Appointments   Date Time Provider Department Center   1/21/2022  3:30 PM Lin Cancino PA-C [facility]         For any urgent concerns, please contact our 24 hour nurse triage line: [phone] ([phone])         FREDO Montero  [phone]  Connected Care Resource [facility]

## 2022-01-21 ENCOUNTER — OFFICE VISIT (OUTPATIENT)
Dept: CARDIOLOGY | Facility: CLINIC | Age: 62
End: 2022-01-21
Attending: PHYSICIAN ASSISTANT
Payer: MEDICARE

## 2022-01-21 VITALS
HEART RATE: 52 BPM | WEIGHT: 190 LBS | HEIGHT: 64 IN | SYSTOLIC BLOOD PRESSURE: 131 MMHG | DIASTOLIC BLOOD PRESSURE: 78 MMHG | BODY MASS INDEX: 32.44 KG/M2

## 2022-01-21 DIAGNOSIS — Z86.73 HISTORY OF CVA (CEREBROVASCULAR ACCIDENT): ICD-10-CM

## 2022-01-21 DIAGNOSIS — I25.10 CORONARY ARTERY DISEASE INVOLVING NATIVE HEART WITHOUT ANGINA PECTORIS, UNSPECIFIED VESSEL OR LESION TYPE: ICD-10-CM

## 2022-01-21 DIAGNOSIS — I48.0 PAROXYSMAL ATRIAL FIBRILLATION (H): Primary | ICD-10-CM

## 2022-01-21 PROCEDURE — 99214 OFFICE O/P EST MOD 30 MIN: CPT | Performed by: PHYSICIAN ASSISTANT

## 2022-01-21 RX ORDER — SIMVASTATIN 40 MG
40 TABLET ORAL AT BEDTIME
Qty: 90 TABLET | Refills: 1 | Status: SHIPPED | OUTPATIENT
Start: 2022-01-21

## 2022-01-21 ASSESSMENT — MIFFLIN-ST. JEOR: SCORE: 1406.83

## 2022-01-21 NOTE — PROGRESS NOTES
Cardiology Progress Note    Patient seen today in follow up of: post hospital follow up.  Primary cardiologist: benoit f/u with Dr. Lu    HPI:  Connie Longoria is a very pleasant 61 year old female with a history of insulin dependent DM type II, coronary artery disease s/p CABG x 4 in 2011 with a LIMA to the LAD, sequential SVG to ramus, the OM and then to the distal circumflex, hypertension, obesity, prior CVA in 2007, carotid artery disease s/p L carotid endarterectomy in 2015, CKD, chronic hepatitis C, rheumatoid arthritis, cirrhosis, significant tobacco use, dyslipidemia, blindness in her left eye, and recent diagnosis of paroxysmal atrial fibrillation who is here today for post hospital follow-up.    Connie has previously followed with an outside cardiology team but was lost to follow-up over the last 5 years.  She had an echo in 2015 that showed an EF of 45% with wall motion abnormalities.  She had a stress test at that time which showed fixed inferior defect without ischemia.  She has been maintained on aspirin, simvastatin, metoprolol XL.    I recently met Connie when she was hospitalized earlier this month.  She presented with weakness and altered mental status.  She was experiencing abdominal pain and urinary frequency and was also vomiting with poor oral intake.  She was hypotensive initially on EMS assessment.  She ultimately was found to have a UTI and was treated with IV antibiotics.  Her labs were suggestive of dehydration with worsening renal insufficiency and an elevated BUN of 77.  She was hydrated with IV fluids.  Cardiology was consulted and she was noted to be in atrial fibrillation with rapid ventricular response on admission.  She converted to normal sinus rhythm after receiving 2 doses of IV metoprolol.  Her high-sensitivity troponin was mildly elevated at 280 and up to 493 on recheck.  She had no symptoms of chest discomfort or shortness of breath.  Her echo showed an EF of 50 to 55% with  inferior wall motion abnormalities consistent with her known prior infarct.  She had aortic sclerosis with very mild aortic stenosis.    I saw her in consultation with Dr. Lu.  We elected to continue her metoprolol and given her significantly elevated [id number] started her on Eliquis for thromboembolic prevention.  Given her lack of anginal symptoms, we do not recommend further ischemic work-up unless she developed symptoms.  She improved with IV fluids and antibiotics and was discharged to [facility] for rehab and was briefly there before transitioning home.    She unfortunately was then readmitted on 1/17/2022 for 2 days after presenting with nausea and vomiting.  She has had issues with that in the past.  GI was involved during her stay and recommended an outpatient work-up possibly with an EGD or gastric emptying study.    Connie is here today for post hospital follow-up.  She notes from a cardiac standpoint she has been doing well since her prior hospitalization.  She has not had symptoms of palpitations.  She did not continues to deny chest discomfort or shortness of breath.  No peripheral edema.  Blood pressure is stable.  She would just left the hospital 2 days ago but has not been having further issues with nausea or vomiting.    ASSESSMENT/PLAN:  Michelle Longoria is a delightful 61-year-old female with a history of coronary artery disease with a CABG x4 in 2011, a mild ischemic cardiomyopathy, hypertension, obesity, CVA, carotid artery disease with prior carotid arterectomy, CKD, among other comorbidities as noted above.      Connie recently was hospitalized with a urinary tract infection, dehydration and weakness and in that setting presented with rapid atrial fibrillation.  She converted to normal sinus rhythm and had no further recurrence of her atrial fibrillation during her hospital stay.  On exam today she is in normal sinus rhythm.  Her [id number] is quite elevated at 7 thus she is on Eliquis for  thromboembolic prevention.  She seems to be tolerating this thus far.    In regards to her coronary disease, she has no concerning anginal symptoms at this point.  As noted, her echo showed low normal LV function with an EF of 50 to 55% and wall motion abnormalities consistent with her prior infarct.  She had a minor troponin elevation in the hospital likely related to her rapid atrial fibrillation and acute illness and not consistent with acute coronary syndrome.  We will continue with medical management of her coronary disease including aspirin, statin, and beta-blocker.      She currently is taking simvastatin 20 mg daily.  I note her last LDL in March when checked was elevated at 109.  I suggested increasing her simvastatin to 40 mg daily which she was agreeable to.  She has allergies listed to atorvastatin and rosuvastatin.  Rosuvastatin has ALT elevation listed as a reaction.  It is unclear what issues she had with the atorvastatin.  I will try to review things further but for now we will continue with simvastatin and recheck a lipid profile when she returns to see us in a few months.    I will have Connie return to see me Dr. Lu to establish care in a few months that she would like to continue to follow-up with us.  In the meantime, if she has any new symptoms or any questions or concerns I asked her to contact us.  It was a pleasure seeing Connie in clinic today.    Orders this Visit:  Orders Placed This Encounter   Procedures     Lipid Profile     ALT     Follow-Up with Cardiology     Orders Placed This Encounter   Medications     simvastatin (ZOCOR) 40 MG tablet     Sig: Take 1 tablet (40 mg) by mouth At Bedtime     Dispense:  90 tablet     Refill:  1     Medications Discontinued During This Encounter   Medication Reason     simvastatin (ZOCOR) 20 MG tablet Reorder       CURRENT MEDICATIONS:  Current Outpatient Medications   Medication Sig Dispense Refill     acetaminophen (TYLENOL) 500 MG tablet Take  1,000 mg by mouth 3 times daily       apixaban ANTICOAGULANT (ELIQUIS) 5 MG tablet Take 1 tablet (5 mg) by mouth 2 times daily 60 tablet 0     aspirin 81 MG tablet Take 81 mg by mouth daily       bimatoprost (LUMIGAN) 0.01 % SOLN Place 1 drop into the right eye At Bedtime       brimonidine-timolol (COMBIGAN) 0.2-0.5 % ophthalmic solution Place 1 drop into the right eye 2 times daily       calcium carbonate (TUMS) 500 MG chewable tablet Take 1 chew tab by mouth every 4 hours as needed for heartburn       citalopram (CELEXA) 10 MG tablet Take 1 tablet (10 mg) by mouth daily 30 tablet 0     dorzolamide (TRUSOPT) 2 % ophthalmic solution Place 1 drop into the right eye 2 times daily       gabapentin (NEURONTIN) 600 MG tablet Take 1 tablet (600 mg) by mouth 3 times daily 60 tablet 0     insulin glargine (LANTUS PEN) 100 UNIT/ML pen Inject 25 Units Subcutaneous At Bedtime (Patient taking differently: Inject 20 Units Subcutaneous At Bedtime ) 15 mL      lisinopril (ZESTRIL) 5 MG tablet Take 1 tablet (5 mg) by mouth daily 30 tablet 0     metoprolol succinate ER (TOPROL-XL) 25 MG 24 hr tablet Take 1 tablet (25 mg) by mouth daily 30 tablet 0     omeprazole (PRILOSEC) 20 MG DR capsule Take 1 capsule (20 mg) by mouth daily 14 capsule 0     ondansetron (ZOFRAN-ODT) 4 MG ODT tab Take 1 tablet (4 mg) by mouth every 8 hours as needed for nausea 12 tablet 0     simvastatin (ZOCOR) 40 MG tablet Take 1 tablet (40 mg) by mouth At Bedtime 90 tablet 1     VITAMIN D PO Take 1 tablet by mouth daily OTC: Patient unsure of strength       ALLERGIES  Allergies   Allergen Reactions     Ascorbate      Other reaction(s): Angioedema  Mouth swells     Atorvastatin      Denies allergy     Effexor [Venlafaxine] Nausea and Vomiting     PN: LW Reaction: gi upset     Other  [No Clinical Screening - See Comments]      Mouth swells  Other reaction(s): Angioedema  PN: LW Other1: -all berry     Rosuvastatin Other (See Comments)     ALT elevated  Alt  elevated  Other reaction(s): Other - Describe In Comment Field  ALT elevated     Wellbutrin [Bupropion] Other (See Comments)     Severe HTN even on meds when on wellbutrin  Other reaction(s): Hypertension     PAST MEDICAL HISTORY:  Past Medical History:   Diagnosis Date     LAURI (acute kidney injury) (H) 12/16/2015     Atrial fibrillation with RVR (H) 1/3/2022     Congestive heart failure, unspecified      Coronary artery disease      CVA (cerebral vascular accident) (H)      Diabetes (H)      Elevated troponin 12/16/2015     Gastro-oesophageal reflux disease      Glaucoma      Heart attack (H)      Hyperlipidemia      Hypertension      Renal disease     from diabetes and steroid use     Rheumatoid arthritis (H)      Stented coronary artery      Steroid long-term use      Stroke (cerebrum) (H)      Type 2 diabetes mellitus (H) 12/21/2007    Overview:  Severe.  Avastin injection followed by focal photocoagulation     Unspecified cerebral artery occlusion with cerebral infarction      Vision loss of left eye 3/15/2017     PAST SURGICAL HISTORY:  Past Surgical History:   Procedure Laterality Date     AS CABG, ARTERY-VEIN, FOUR       CARDIAC SURGERY       COLONOSCOPY       CYCLOPHOTOCOAGULATION TRANSSCLERAL WITH MICROPULSE LASER Right 4/17/2019    Procedure: Right Eye Micropulse Cyclophotocoagulation;  Surgeon: Bethany William MD;  Location:  OR     EYE SURGERY Left      EYE SURGERY Right 11/08/2017    shunt placement     IMPLANT VALVE EYE Left 9/18/2015    Procedure: IMPLANT VALVE EYE;  Surgeon: Harlan Harris MD;  Location: [facility]     IMPLANT VALVE EYE Left 11/3/2015    Procedure: IMPLANT VALVE EYE;  Surgeon: Harlan Harris MD;  Location: [facility]     REVISE GLAUCOMA SHUNT Left 10/9/2015    Procedure: REVISE GLAUCOMA SHUNT;  Surgeon: Harlan Harris MD;  Location: [facility]     REVISE GLAUCOMA SHUNT Left 2/23/2016    Procedure: REVISE GLAUCOMA SHUNT;  Surgeon: Harlan Harris MD;  Location: [facility]     VASCULAR  "SURGERY      carodid endarectomy     FAMILY HISTORY:  Family History   Problem Relation Age of Onset     Diabetes Mother      Glaucoma Mother      Diabetes Brother      Glaucoma Sister      Diabetes Sister      Hypertension No family hx of      Macular Degeneration No family hx of      Retinal detachment No family hx of      SOCIAL HISTORY:  Social History     Socioeconomic History     Marital status:      Spouse name: None     Number of children: None     Years of education: None     Highest education level: None   Occupational History     None   Tobacco Use     Smoking status: Former Smoker     Types: Cigarettes     Start date: 2006     Quit date: 2021     Years since quittin.5     Smokeless tobacco: Never Used     Tobacco comment: 2 per day   Substance and Sexual Activity     Alcohol use: No     Drug use: No     Sexual activity: None   Other Topics Concern     Parent/sibling w/ CABG, MI or angioplasty before 65F 55M? Not Asked   Social History Narrative     None     Social Determinants of Health     Financial Resource Strain: Not on file   Food Insecurity: Not on file   Transportation Needs: Not on file   Physical Activity: Not on file   Stress: Not on file   Social Connections: Not on file   Intimate Partner Violence: Not on file   Housing Stability: Not on file     Review of Systems:  Skin:  Negative     Eyes:  Negative    ENT:  Negative    Respiratory:  Negative    Cardiovascular:    fatigue;Positive for  Gastroenterology: Negative    Genitourinary:  Negative    Musculoskeletal:  Negative    Neurologic:  Negative    Psychiatric:  Negative    Heme/Lymph/Imm:  Negative    Endocrine:  Negative       Physical Exam:  Vitals: /78   Pulse 52   Ht 1.626 m (5' 4\")   Wt 86.2 kg (190 lb)   BMI 32.61 kg/m     Wt Readings from Last 4 Encounters:   22 86.2 kg (190 lb)   22 81.5 kg (179 lb 10.8 oz)   22 85.5 kg (188 lb 8 oz)   22 88.2 kg (194 lb 6.4 oz)     GEN: well " nourished, in no acute distress.  HEENT:  Pupils equal, round. Sclerae nonicteric. .  C/V:  Regular rate and rhythm, no murmur, rub or gallop.    RESP: Respirations are unlabored. Clear to auscultation bilaterally without wheezing, rales, or rhonchi.  GI: Abdomen soft, nontender.  EXTREM: no LE edema.  NEURO: Alert and oriented, cooperative.  SKIN: Warm and dry.     Recent Lab Results:  LIPID RESULTS:  No results found for: CHOL, HDL, LDL, TRIG, CHOLHDLRATIO  LIVER ENZYME RESULTS:  Lab Results   Component Value Date    AST 24 01/17/2022    AST 32 10/24/2019    ALT 23 01/17/2022    ALT 22 10/24/2019     CBC RESULTS:  Lab Results   Component Value Date    WBC 11.0 01/17/2022    WBC 11.0 10/24/2019    RBC 4.92 01/17/2022    RBC 4.84 10/24/2019    HGB 15.6 01/17/2022    HGB 16.3 (H) 10/24/2019    HCT 45.1 01/17/2022    HCT 46.6 10/24/2019    MCV 92 01/17/2022    MCV 96 10/24/2019    MCH 31.7 01/17/2022    MCH 33.7 (H) 10/24/2019    MCHC 34.6 01/17/2022    MCHC 35.0 10/24/2019    RDW 12.8 01/17/2022    RDW 12.7 10/24/2019     01/17/2022     10/24/2019     BMP RESULTS:  Lab Results   Component Value Date     01/19/2022     10/24/2019    POTASSIUM 3.6 01/19/2022    POTASSIUM 4.2 10/24/2019    CHLORIDE 107 01/19/2022    CHLORIDE 101 10/24/2019    CO2 23 01/19/2022    CO2 24 10/24/2019    ANIONGAP 5 01/19/2022    ANIONGAP 9 10/24/2019     (H) 01/19/2022     (H) 01/19/2022     (H) 10/24/2019    BUN 24 01/19/2022    BUN 26 10/24/2019    CR 1.14 (H) 01/19/2022    CR 0.94 10/24/2019    GFRESTIMATED 54 (L) 01/19/2022    GFRESTIMATED 57 (L) 10/24/2019    GFRESTBLACK 68 10/24/2019    THOMAS 9.1 01/19/2022    THOMAS 10.1 10/24/2019      A1C RESULTS:  Lab Results   Component Value Date    A1C 6.4 (H) 01/03/2022     INR RESULTS:  Lab Results   Component Value Date    INR 1.11 11/04/2017       Lin Cancino PA-C  [facility]

## 2022-01-21 NOTE — PATIENT INSTRUCTIONS
"Thank you for your [facility] visit today. Your provider has recommended the following:    Medication Changes:  -  INCREASE simvastatin to 40 mg daily. You can take 2 of the 20 mg tablets you have at home. I sent in a new prescription for 40 mg daily then to your pharmacy.  -  Please call me with any issues after increasing your dose.  Recommendations:  Call us with chest pain, shortness of breath or any other concerns.  Follow-up:  See Dr. Lu in 3 - 6 months with a lipid profile (fasting) before hand.    Reminder:  Please bring in all current medications, over the counter supplements and vitamin bottles to your next appointment.  Important \"[facility]\" telephone numbers for your reference:  Cardiology Scheduling - [phone]  Cardiology Clinic RN-  [phone]     [facility]    "

## 2022-02-17 NOTE — PROGRESS NOTES
Attempted to reach patient twice by phone ie patient was scheduled again with LUC and at last visti was instructed to set up with Cardiologist to establish care.  Messaged to scheduling team to reschedule to Dr Lu.  Mady Lee RN 02/17/22 9:26 AM

## 2023-02-07 ENCOUNTER — TRANSFERRED RECORDS (OUTPATIENT)
Dept: HEALTH INFORMATION MANAGEMENT | Facility: CLINIC | Age: 63
End: 2023-02-07
Payer: MEDICARE

## 2023-02-07 LAB — RETINOPATHY: POSITIVE

## 2024-08-27 ENCOUNTER — TRANSFERRED RECORDS (OUTPATIENT)
Dept: HEALTH INFORMATION MANAGEMENT | Facility: CLINIC | Age: 64
End: 2024-08-27
Payer: MEDICARE

## 2024-08-27 LAB — RETINOPATHY: POSITIVE

## (undated) DEVICE — EYE DRSG PAD OVAL

## (undated) DEVICE — LINEN TOWEL PACK X5 5464

## (undated) DEVICE — APPLICATOR COTTON TIP 3" PKG OF 10 34831010

## (undated) DEVICE — Device

## (undated) DEVICE — NDL 30GA 0.5" 305106

## (undated) DEVICE — EYE SHIELD PLASTIC

## (undated) DEVICE — DRSG GAUZE 4X4" 2187

## (undated) DEVICE — SYR 01ML TBC SLIP TIP W/O NDL

## (undated) DEVICE — SOL WATER IRRIG 500ML BOTTLE 2F7113

## (undated) DEVICE — BOWL 32OZ STERILE 01232

## (undated) RX ORDER — ACETAMINOPHEN 325 MG/1
TABLET ORAL
Status: DISPENSED
Start: 2019-04-17

## (undated) RX ORDER — DEXTROSE MONOHYDRATE 25 G/50ML
INJECTION, SOLUTION INTRAVENOUS
Status: DISPENSED
Start: 2019-04-17

## (undated) RX ORDER — ACETAZOLAMIDE 250 MG/1
TABLET ORAL
Status: DISPENSED
Start: 2017-11-03